# Patient Record
Sex: FEMALE | Race: WHITE | Employment: OTHER | URBAN - NONMETROPOLITAN AREA
[De-identification: names, ages, dates, MRNs, and addresses within clinical notes are randomized per-mention and may not be internally consistent; named-entity substitution may affect disease eponyms.]

---

## 2017-01-17 ENCOUNTER — OFFICE VISIT (OUTPATIENT)
Dept: FAMILY MEDICINE CLINIC | Age: 82
End: 2017-01-17
Payer: MEDICARE

## 2017-01-17 VITALS
TEMPERATURE: 99.2 F | SYSTOLIC BLOOD PRESSURE: 128 MMHG | DIASTOLIC BLOOD PRESSURE: 70 MMHG | RESPIRATION RATE: 20 BRPM | HEIGHT: 68 IN | WEIGHT: 185 LBS | BODY MASS INDEX: 28.04 KG/M2 | HEART RATE: 62 BPM | OXYGEN SATURATION: 96 %

## 2017-01-17 DIAGNOSIS — G89.29 CHRONIC MIDLINE LOW BACK PAIN WITHOUT SCIATICA: ICD-10-CM

## 2017-01-17 DIAGNOSIS — M15.9 PRIMARY OSTEOARTHRITIS INVOLVING MULTIPLE JOINTS: ICD-10-CM

## 2017-01-17 DIAGNOSIS — I10 ESSENTIAL HYPERTENSION: ICD-10-CM

## 2017-01-17 DIAGNOSIS — J30.2 SEASONAL ALLERGIC RHINITIS, UNSPECIFIED ALLERGIC RHINITIS TRIGGER: ICD-10-CM

## 2017-01-17 DIAGNOSIS — M54.50 CHRONIC MIDLINE LOW BACK PAIN WITHOUT SCIATICA: ICD-10-CM

## 2017-01-17 DIAGNOSIS — E03.9 ACQUIRED HYPOTHYROIDISM: ICD-10-CM

## 2017-01-17 DIAGNOSIS — K21.9 GASTROESOPHAGEAL REFLUX DISEASE WITHOUT ESOPHAGITIS: ICD-10-CM

## 2017-01-17 PROCEDURE — 1036F TOBACCO NON-USER: CPT | Performed by: FAMILY MEDICINE

## 2017-01-17 PROCEDURE — G8484 FLU IMMUNIZE NO ADMIN: HCPCS | Performed by: FAMILY MEDICINE

## 2017-01-17 PROCEDURE — G8428 CUR MEDS NOT DOCUMENT: HCPCS | Performed by: FAMILY MEDICINE

## 2017-01-17 PROCEDURE — 4040F PNEUMOC VAC/ADMIN/RCVD: CPT | Performed by: FAMILY MEDICINE

## 2017-01-17 PROCEDURE — 1090F PRES/ABSN URINE INCON ASSESS: CPT | Performed by: FAMILY MEDICINE

## 2017-01-17 PROCEDURE — 96372 THER/PROPH/DIAG INJ SC/IM: CPT | Performed by: FAMILY MEDICINE

## 2017-01-17 PROCEDURE — 99214 OFFICE O/P EST MOD 30 MIN: CPT | Performed by: FAMILY MEDICINE

## 2017-01-17 PROCEDURE — G8399 PT W/DXA RESULTS DOCUMENT: HCPCS | Performed by: FAMILY MEDICINE

## 2017-01-17 PROCEDURE — G8420 CALC BMI NORM PARAMETERS: HCPCS | Performed by: FAMILY MEDICINE

## 2017-01-17 PROCEDURE — 1123F ACP DISCUSS/DSCN MKR DOCD: CPT | Performed by: FAMILY MEDICINE

## 2017-01-17 RX ORDER — TRIAMCINOLONE ACETONIDE 40 MG/ML
80 INJECTION, SUSPENSION INTRA-ARTICULAR; INTRAMUSCULAR ONCE
Status: COMPLETED | OUTPATIENT
Start: 2017-01-17 | End: 2017-01-17

## 2017-01-17 RX ORDER — HYDROCODONE BITARTRATE AND ACETAMINOPHEN 10; 325 MG/1; MG/1
1 TABLET ORAL EVERY 6 HOURS PRN
Qty: 120 TABLET | Refills: 0 | Status: SHIPPED | OUTPATIENT
Start: 2017-01-17 | End: 2017-03-28 | Stop reason: SDUPTHER

## 2017-01-17 RX ADMIN — TRIAMCINOLONE ACETONIDE 80 MG: 40 INJECTION, SUSPENSION INTRA-ARTICULAR; INTRAMUSCULAR at 14:00

## 2017-01-17 ASSESSMENT — ENCOUNTER SYMPTOMS
GASTROINTESTINAL NEGATIVE: 1
BACK PAIN: 1
RESPIRATORY NEGATIVE: 1
EYES NEGATIVE: 1
ALLERGIC/IMMUNOLOGIC NEGATIVE: 1

## 2017-01-24 PROBLEM — K21.9 GASTROESOPHAGEAL REFLUX DISEASE WITHOUT ESOPHAGITIS: Status: ACTIVE | Noted: 2017-01-24

## 2017-03-28 ENCOUNTER — OFFICE VISIT (OUTPATIENT)
Dept: FAMILY MEDICINE CLINIC | Age: 82
End: 2017-03-28
Payer: MEDICARE

## 2017-03-28 VITALS
RESPIRATION RATE: 16 BRPM | DIASTOLIC BLOOD PRESSURE: 64 MMHG | TEMPERATURE: 98.4 F | WEIGHT: 181 LBS | HEART RATE: 70 BPM | OXYGEN SATURATION: 97 % | SYSTOLIC BLOOD PRESSURE: 108 MMHG | HEIGHT: 65 IN | BODY MASS INDEX: 30.16 KG/M2

## 2017-03-28 DIAGNOSIS — M15.9 PRIMARY OSTEOARTHRITIS INVOLVING MULTIPLE JOINTS: ICD-10-CM

## 2017-03-28 DIAGNOSIS — E78.1 HYPERTRIGLYCERIDEMIA: ICD-10-CM

## 2017-03-28 DIAGNOSIS — I10 ESSENTIAL HYPERTENSION: ICD-10-CM

## 2017-03-28 DIAGNOSIS — E03.9 ACQUIRED HYPOTHYROIDISM: ICD-10-CM

## 2017-03-28 DIAGNOSIS — M54.50 CHRONIC MIDLINE LOW BACK PAIN WITHOUT SCIATICA: ICD-10-CM

## 2017-03-28 DIAGNOSIS — K21.9 GASTROESOPHAGEAL REFLUX DISEASE WITHOUT ESOPHAGITIS: ICD-10-CM

## 2017-03-28 DIAGNOSIS — G89.29 CHRONIC MIDLINE LOW BACK PAIN WITHOUT SCIATICA: ICD-10-CM

## 2017-03-28 DIAGNOSIS — J30.2 SEASONAL ALLERGIC RHINITIS, UNSPECIFIED ALLERGIC RHINITIS TRIGGER: ICD-10-CM

## 2017-03-28 DIAGNOSIS — G62.9 NEUROPATHY: ICD-10-CM

## 2017-03-28 PROCEDURE — G8417 CALC BMI ABV UP PARAM F/U: HCPCS | Performed by: FAMILY MEDICINE

## 2017-03-28 PROCEDURE — 99214 OFFICE O/P EST MOD 30 MIN: CPT | Performed by: FAMILY MEDICINE

## 2017-03-28 PROCEDURE — 1090F PRES/ABSN URINE INCON ASSESS: CPT | Performed by: FAMILY MEDICINE

## 2017-03-28 PROCEDURE — G8399 PT W/DXA RESULTS DOCUMENT: HCPCS | Performed by: FAMILY MEDICINE

## 2017-03-28 PROCEDURE — G8484 FLU IMMUNIZE NO ADMIN: HCPCS | Performed by: FAMILY MEDICINE

## 2017-03-28 PROCEDURE — 1036F TOBACCO NON-USER: CPT | Performed by: FAMILY MEDICINE

## 2017-03-28 PROCEDURE — G8427 DOCREV CUR MEDS BY ELIG CLIN: HCPCS | Performed by: FAMILY MEDICINE

## 2017-03-28 PROCEDURE — 4040F PNEUMOC VAC/ADMIN/RCVD: CPT | Performed by: FAMILY MEDICINE

## 2017-03-28 PROCEDURE — 1123F ACP DISCUSS/DSCN MKR DOCD: CPT | Performed by: FAMILY MEDICINE

## 2017-03-28 RX ORDER — PREGABALIN 75 MG/1
75 CAPSULE ORAL 3 TIMES DAILY
Qty: 90 CAPSULE | Refills: 0 | Status: SHIPPED | OUTPATIENT
Start: 2017-03-28 | End: 2017-04-25 | Stop reason: SINTOL

## 2017-03-28 RX ORDER — HYDROCODONE BITARTRATE AND ACETAMINOPHEN 10; 325 MG/1; MG/1
1 TABLET ORAL EVERY 6 HOURS PRN
Qty: 120 TABLET | Refills: 0 | Status: SHIPPED | OUTPATIENT
Start: 2017-03-28 | End: 2017-04-25 | Stop reason: SDUPTHER

## 2017-03-28 ASSESSMENT — ENCOUNTER SYMPTOMS
EYES NEGATIVE: 1
CHEST TIGHTNESS: 0
BACK PAIN: 1
CONSTIPATION: 0
WHEEZING: 0
ALLERGIC/IMMUNOLOGIC NEGATIVE: 1
SHORTNESS OF BREATH: 0
VOMITING: 0
BLOOD IN STOOL: 0
DIARRHEA: 0
NAUSEA: 1

## 2017-04-19 PROBLEM — I25.10 CORONARY ARTERY DISEASE INVOLVING NATIVE CORONARY ARTERY: Status: ACTIVE | Noted: 2017-04-19

## 2017-04-19 PROBLEM — I10 BENIGN HYPERTENSION: Status: ACTIVE | Noted: 2017-04-19

## 2017-04-19 RX ORDER — ESOMEPRAZOLE MAGNESIUM 40 MG/1
40 CAPSULE, DELAYED RELEASE ORAL
COMMUNITY
End: 2020-05-28 | Stop reason: ALTCHOICE

## 2017-04-19 RX ORDER — MELOXICAM 15 MG/1
15 TABLET ORAL DAILY
COMMUNITY
End: 2017-05-16 | Stop reason: ALTCHOICE

## 2017-04-19 RX ORDER — HYDROCODONE BITARTRATE AND ACETAMINOPHEN 10; 325 MG/1; MG/1
1 TABLET ORAL EVERY 6 HOURS PRN
COMMUNITY
End: 2017-11-28 | Stop reason: HOSPADM

## 2017-04-19 RX ORDER — VALSARTAN AND HYDROCHLOROTHIAZIDE 160; 25 MG/1; MG/1
1 TABLET ORAL DAILY
COMMUNITY
End: 2017-11-28 | Stop reason: HOSPADM

## 2017-04-19 RX ORDER — DILTIAZEM HYDROCHLORIDE 90 MG/1
90 CAPSULE, EXTENDED RELEASE ORAL 2 TIMES DAILY
COMMUNITY

## 2017-04-19 RX ORDER — LEVOTHYROXINE SODIUM 0.05 MG/1
50 TABLET ORAL DAILY
COMMUNITY

## 2017-04-19 RX ORDER — SIMVASTATIN 20 MG
20 TABLET ORAL NIGHTLY
COMMUNITY
End: 2020-05-28 | Stop reason: ALTCHOICE

## 2017-04-19 RX ORDER — FUROSEMIDE 20 MG/1
20 TABLET ORAL DAILY
COMMUNITY

## 2017-04-25 ENCOUNTER — OFFICE VISIT (OUTPATIENT)
Dept: FAMILY MEDICINE CLINIC | Age: 82
End: 2017-04-25
Payer: MEDICARE

## 2017-04-25 VITALS
HEART RATE: 69 BPM | HEIGHT: 65 IN | DIASTOLIC BLOOD PRESSURE: 70 MMHG | RESPIRATION RATE: 16 BRPM | WEIGHT: 180 LBS | TEMPERATURE: 98.2 F | OXYGEN SATURATION: 96 % | SYSTOLIC BLOOD PRESSURE: 118 MMHG | BODY MASS INDEX: 29.99 KG/M2

## 2017-04-25 DIAGNOSIS — J30.2 SEASONAL ALLERGIC RHINITIS, UNSPECIFIED ALLERGIC RHINITIS TRIGGER: ICD-10-CM

## 2017-04-25 DIAGNOSIS — G62.9 NEUROPATHY: ICD-10-CM

## 2017-04-25 DIAGNOSIS — M15.9 PRIMARY OSTEOARTHRITIS INVOLVING MULTIPLE JOINTS: ICD-10-CM

## 2017-04-25 DIAGNOSIS — E03.4 HYPOTHYROIDISM DUE TO ACQUIRED ATROPHY OF THYROID: ICD-10-CM

## 2017-04-25 DIAGNOSIS — I10 ESSENTIAL HYPERTENSION: ICD-10-CM

## 2017-04-25 DIAGNOSIS — N39.0 URINARY TRACT INFECTION WITHOUT HEMATURIA, SITE UNSPECIFIED: ICD-10-CM

## 2017-04-25 DIAGNOSIS — M54.50 CHRONIC MIDLINE LOW BACK PAIN WITHOUT SCIATICA: ICD-10-CM

## 2017-04-25 DIAGNOSIS — K21.9 GASTROESOPHAGEAL REFLUX DISEASE WITHOUT ESOPHAGITIS: ICD-10-CM

## 2017-04-25 DIAGNOSIS — E78.1 HYPERTRIGLYCERIDEMIA: ICD-10-CM

## 2017-04-25 DIAGNOSIS — I34.1 MVP (MITRAL VALVE PROLAPSE): ICD-10-CM

## 2017-04-25 DIAGNOSIS — G89.29 CHRONIC MIDLINE LOW BACK PAIN WITHOUT SCIATICA: ICD-10-CM

## 2017-04-25 LAB
BILIRUBIN URINE: ABNORMAL
BLOOD, URINE: ABNORMAL
CLARITY: ABNORMAL
COLOR: ABNORMAL
GLUCOSE URINE: 250 MG/DL
KETONES, URINE: 15 MG/DL
LEUKOCYTE ESTERASE, URINE: ABNORMAL
NITRITE, URINE: POSITIVE
PH UA: 5
PROTEIN UA: 100 MG/DL
SPECIFIC GRAVITY UA: <=1.005
URINE TYPE: ABNORMAL
UROBILINOGEN, URINE: >=8 E.U./DL

## 2017-04-25 PROCEDURE — G8420 CALC BMI NORM PARAMETERS: HCPCS | Performed by: FAMILY MEDICINE

## 2017-04-25 PROCEDURE — 96372 THER/PROPH/DIAG INJ SC/IM: CPT | Performed by: FAMILY MEDICINE

## 2017-04-25 PROCEDURE — 4040F PNEUMOC VAC/ADMIN/RCVD: CPT | Performed by: FAMILY MEDICINE

## 2017-04-25 PROCEDURE — 1090F PRES/ABSN URINE INCON ASSESS: CPT | Performed by: FAMILY MEDICINE

## 2017-04-25 PROCEDURE — G8399 PT W/DXA RESULTS DOCUMENT: HCPCS | Performed by: FAMILY MEDICINE

## 2017-04-25 PROCEDURE — G8427 DOCREV CUR MEDS BY ELIG CLIN: HCPCS | Performed by: FAMILY MEDICINE

## 2017-04-25 PROCEDURE — 1123F ACP DISCUSS/DSCN MKR DOCD: CPT | Performed by: FAMILY MEDICINE

## 2017-04-25 PROCEDURE — 99214 OFFICE O/P EST MOD 30 MIN: CPT | Performed by: FAMILY MEDICINE

## 2017-04-25 PROCEDURE — 1036F TOBACCO NON-USER: CPT | Performed by: FAMILY MEDICINE

## 2017-04-25 RX ORDER — CIPROFLOXACIN 500 MG/1
500 TABLET, FILM COATED ORAL 2 TIMES DAILY
Qty: 20 TABLET | Refills: 0 | Status: SHIPPED | OUTPATIENT
Start: 2017-04-25 | End: 2017-05-05

## 2017-04-25 RX ORDER — TRIAMCINOLONE ACETONIDE 40 MG/ML
80 INJECTION, SUSPENSION INTRA-ARTICULAR; INTRAMUSCULAR ONCE
Status: COMPLETED | OUTPATIENT
Start: 2017-04-25 | End: 2017-04-25

## 2017-04-25 RX ORDER — CEFTRIAXONE 500 MG/1
1000 INJECTION, POWDER, FOR SOLUTION INTRAMUSCULAR; INTRAVENOUS ONCE
Status: COMPLETED | OUTPATIENT
Start: 2017-04-25 | End: 2017-04-25

## 2017-04-25 RX ORDER — HYDROCODONE BITARTRATE AND ACETAMINOPHEN 10; 325 MG/1; MG/1
1 TABLET ORAL EVERY 6 HOURS PRN
Qty: 120 TABLET | Refills: 0 | Status: SHIPPED | OUTPATIENT
Start: 2017-04-25 | End: 2017-06-17 | Stop reason: SDUPTHER

## 2017-04-25 RX ORDER — PHENAZOPYRIDINE HYDROCHLORIDE 200 MG/1
200 TABLET, FILM COATED ORAL 3 TIMES DAILY PRN
Qty: 15 TABLET | Refills: 1 | Status: SHIPPED | OUTPATIENT
Start: 2017-04-25 | End: 2017-04-28

## 2017-04-25 RX ADMIN — TRIAMCINOLONE ACETONIDE 80 MG: 40 INJECTION, SUSPENSION INTRA-ARTICULAR; INTRAMUSCULAR at 13:43

## 2017-04-25 RX ADMIN — CEFTRIAXONE 1000 MG: 500 INJECTION, POWDER, FOR SOLUTION INTRAMUSCULAR; INTRAVENOUS at 13:42

## 2017-04-25 ASSESSMENT — ENCOUNTER SYMPTOMS
EYES NEGATIVE: 1
CONSTIPATION: 0
VOMITING: 0
NAUSEA: 0
BLOOD IN STOOL: 0
DIARRHEA: 0
SHORTNESS OF BREATH: 0
WHEEZING: 0
CHEST TIGHTNESS: 0
BACK PAIN: 1
ALLERGIC/IMMUNOLOGIC NEGATIVE: 1

## 2017-04-27 LAB
ORGANISM: ABNORMAL
URINE CULTURE, ROUTINE: ABNORMAL
URINE CULTURE, ROUTINE: ABNORMAL

## 2017-05-16 ENCOUNTER — OFFICE VISIT (OUTPATIENT)
Dept: CARDIOLOGY | Facility: CLINIC | Age: 82
End: 2017-05-16

## 2017-05-16 VITALS
HEART RATE: 63 BPM | DIASTOLIC BLOOD PRESSURE: 64 MMHG | WEIGHT: 182 LBS | SYSTOLIC BLOOD PRESSURE: 118 MMHG | HEIGHT: 65 IN | BODY MASS INDEX: 30.32 KG/M2

## 2017-05-16 DIAGNOSIS — E78.5 HYPERLIPIDEMIA, UNSPECIFIED HYPERLIPIDEMIA TYPE: ICD-10-CM

## 2017-05-16 DIAGNOSIS — I25.10 CORONARY ARTERY DISEASE INVOLVING NATIVE CORONARY ARTERY OF NATIVE HEART WITHOUT ANGINA PECTORIS: Primary | ICD-10-CM

## 2017-05-16 DIAGNOSIS — I10 BENIGN HYPERTENSION: ICD-10-CM

## 2017-05-16 DIAGNOSIS — I50.32 CHRONIC DIASTOLIC (CONGESTIVE) HEART FAILURE (HCC): ICD-10-CM

## 2017-05-16 PROCEDURE — 99213 OFFICE O/P EST LOW 20 MIN: CPT | Performed by: INTERNAL MEDICINE

## 2017-05-16 PROCEDURE — 93000 ELECTROCARDIOGRAM COMPLETE: CPT | Performed by: INTERNAL MEDICINE

## 2017-05-16 RX ORDER — CETIRIZINE HYDROCHLORIDE 5 MG/1
5 TABLET ORAL DAILY
COMMUNITY

## 2017-05-16 RX ORDER — CHLORAL HYDRATE 500 MG
2000 CAPSULE ORAL
COMMUNITY

## 2017-05-22 DIAGNOSIS — I10 ESSENTIAL HYPERTENSION: ICD-10-CM

## 2017-05-22 RX ORDER — DILTIAZEM HCL 90 MG
TABLET ORAL
Qty: 180 TABLET | Refills: 3 | Status: SHIPPED | OUTPATIENT
Start: 2017-05-22 | End: 2018-06-16 | Stop reason: SDUPTHER

## 2017-05-23 RX ORDER — CIPROFLOXACIN 500 MG/1
500 TABLET, FILM COATED ORAL 2 TIMES DAILY
Qty: 20 TABLET | Refills: 0 | Status: SHIPPED | OUTPATIENT
Start: 2017-05-23 | End: 2017-06-02

## 2017-05-23 RX ORDER — BENZONATATE 200 MG/1
200 CAPSULE ORAL 3 TIMES DAILY PRN
Qty: 40 CAPSULE | Refills: 0 | Status: SHIPPED | OUTPATIENT
Start: 2017-05-23 | End: 2017-06-17 | Stop reason: ALTCHOICE

## 2017-06-17 ENCOUNTER — OFFICE VISIT (OUTPATIENT)
Dept: FAMILY MEDICINE CLINIC | Age: 82
End: 2017-06-17
Payer: MEDICARE

## 2017-06-17 VITALS
RESPIRATION RATE: 16 BRPM | OXYGEN SATURATION: 97 % | WEIGHT: 183 LBS | SYSTOLIC BLOOD PRESSURE: 116 MMHG | HEIGHT: 65 IN | BODY MASS INDEX: 30.49 KG/M2 | HEART RATE: 87 BPM | TEMPERATURE: 98.3 F | DIASTOLIC BLOOD PRESSURE: 68 MMHG

## 2017-06-17 DIAGNOSIS — M15.9 PRIMARY OSTEOARTHRITIS INVOLVING MULTIPLE JOINTS: ICD-10-CM

## 2017-06-17 DIAGNOSIS — G89.29 CHRONIC MIDLINE LOW BACK PAIN WITHOUT SCIATICA: ICD-10-CM

## 2017-06-17 DIAGNOSIS — K21.9 GASTROESOPHAGEAL REFLUX DISEASE WITHOUT ESOPHAGITIS: ICD-10-CM

## 2017-06-17 DIAGNOSIS — E03.4 HYPOTHYROIDISM DUE TO ACQUIRED ATROPHY OF THYROID: ICD-10-CM

## 2017-06-17 DIAGNOSIS — I10 ESSENTIAL HYPERTENSION: ICD-10-CM

## 2017-06-17 DIAGNOSIS — E78.1 HYPERTRIGLYCERIDEMIA: ICD-10-CM

## 2017-06-17 DIAGNOSIS — M54.50 CHRONIC MIDLINE LOW BACK PAIN WITHOUT SCIATICA: ICD-10-CM

## 2017-06-17 PROCEDURE — G8399 PT W/DXA RESULTS DOCUMENT: HCPCS | Performed by: FAMILY MEDICINE

## 2017-06-17 PROCEDURE — 4040F PNEUMOC VAC/ADMIN/RCVD: CPT | Performed by: FAMILY MEDICINE

## 2017-06-17 PROCEDURE — 1090F PRES/ABSN URINE INCON ASSESS: CPT | Performed by: FAMILY MEDICINE

## 2017-06-17 PROCEDURE — 1036F TOBACCO NON-USER: CPT | Performed by: FAMILY MEDICINE

## 2017-06-17 PROCEDURE — 1123F ACP DISCUSS/DSCN MKR DOCD: CPT | Performed by: FAMILY MEDICINE

## 2017-06-17 PROCEDURE — 99214 OFFICE O/P EST MOD 30 MIN: CPT | Performed by: FAMILY MEDICINE

## 2017-06-17 PROCEDURE — G8427 DOCREV CUR MEDS BY ELIG CLIN: HCPCS | Performed by: FAMILY MEDICINE

## 2017-06-17 PROCEDURE — G8417 CALC BMI ABV UP PARAM F/U: HCPCS | Performed by: FAMILY MEDICINE

## 2017-06-17 RX ORDER — HYDROCODONE BITARTRATE AND ACETAMINOPHEN 10; 325 MG/1; MG/1
1 TABLET ORAL EVERY 6 HOURS PRN
Qty: 120 TABLET | Refills: 0 | Status: SHIPPED | OUTPATIENT
Start: 2017-06-17 | End: 2017-08-07 | Stop reason: SDUPTHER

## 2017-06-17 ASSESSMENT — ENCOUNTER SYMPTOMS
BACK PAIN: 1
WHEEZING: 0
ALLERGIC/IMMUNOLOGIC NEGATIVE: 1
BLOOD IN STOOL: 0
SHORTNESS OF BREATH: 0
VOMITING: 0
CONSTIPATION: 0
CHEST TIGHTNESS: 0
EYES NEGATIVE: 1
NAUSEA: 0
DIARRHEA: 0

## 2017-08-07 ENCOUNTER — OFFICE VISIT (OUTPATIENT)
Dept: FAMILY MEDICINE CLINIC | Age: 82
End: 2017-08-07
Payer: MEDICARE

## 2017-08-07 VITALS
OXYGEN SATURATION: 97 % | DIASTOLIC BLOOD PRESSURE: 64 MMHG | HEIGHT: 64 IN | HEART RATE: 68 BPM | TEMPERATURE: 98.7 F | BODY MASS INDEX: 32.1 KG/M2 | WEIGHT: 188 LBS | SYSTOLIC BLOOD PRESSURE: 130 MMHG | RESPIRATION RATE: 16 BRPM

## 2017-08-07 DIAGNOSIS — K21.9 GASTROESOPHAGEAL REFLUX DISEASE WITHOUT ESOPHAGITIS: ICD-10-CM

## 2017-08-07 DIAGNOSIS — M15.9 PRIMARY OSTEOARTHRITIS INVOLVING MULTIPLE JOINTS: ICD-10-CM

## 2017-08-07 DIAGNOSIS — J30.2 SEASONAL ALLERGIC RHINITIS, UNSPECIFIED ALLERGIC RHINITIS TRIGGER: ICD-10-CM

## 2017-08-07 DIAGNOSIS — I10 ESSENTIAL HYPERTENSION: ICD-10-CM

## 2017-08-07 DIAGNOSIS — G89.29 CHRONIC MIDLINE LOW BACK PAIN WITHOUT SCIATICA: ICD-10-CM

## 2017-08-07 DIAGNOSIS — M54.50 CHRONIC MIDLINE LOW BACK PAIN WITHOUT SCIATICA: ICD-10-CM

## 2017-08-07 DIAGNOSIS — E78.1 HYPERTRIGLYCERIDEMIA: ICD-10-CM

## 2017-08-07 DIAGNOSIS — E03.4 HYPOTHYROIDISM DUE TO ACQUIRED ATROPHY OF THYROID: ICD-10-CM

## 2017-08-07 DIAGNOSIS — R73.9 HYPERGLYCEMIA: ICD-10-CM

## 2017-08-07 LAB — GLUCOSE BLD-MCNC: 98 MG/DL

## 2017-08-07 PROCEDURE — G8428 CUR MEDS NOT DOCUMENT: HCPCS | Performed by: FAMILY MEDICINE

## 2017-08-07 PROCEDURE — G8417 CALC BMI ABV UP PARAM F/U: HCPCS | Performed by: FAMILY MEDICINE

## 2017-08-07 PROCEDURE — 96372 THER/PROPH/DIAG INJ SC/IM: CPT | Performed by: FAMILY MEDICINE

## 2017-08-07 PROCEDURE — 82962 GLUCOSE BLOOD TEST: CPT | Performed by: FAMILY MEDICINE

## 2017-08-07 PROCEDURE — 4040F PNEUMOC VAC/ADMIN/RCVD: CPT | Performed by: FAMILY MEDICINE

## 2017-08-07 PROCEDURE — 1036F TOBACCO NON-USER: CPT | Performed by: FAMILY MEDICINE

## 2017-08-07 PROCEDURE — 1090F PRES/ABSN URINE INCON ASSESS: CPT | Performed by: FAMILY MEDICINE

## 2017-08-07 PROCEDURE — 99214 OFFICE O/P EST MOD 30 MIN: CPT | Performed by: FAMILY MEDICINE

## 2017-08-07 PROCEDURE — G8399 PT W/DXA RESULTS DOCUMENT: HCPCS | Performed by: FAMILY MEDICINE

## 2017-08-07 PROCEDURE — 1123F ACP DISCUSS/DSCN MKR DOCD: CPT | Performed by: FAMILY MEDICINE

## 2017-08-07 RX ORDER — HYDROCODONE BITARTRATE AND ACETAMINOPHEN 10; 325 MG/1; MG/1
1 TABLET ORAL EVERY 6 HOURS PRN
Qty: 120 TABLET | Refills: 0 | Status: SHIPPED | OUTPATIENT
Start: 2017-08-07 | End: 2017-09-18 | Stop reason: SDUPTHER

## 2017-08-07 RX ORDER — TRIAMCINOLONE ACETONIDE 40 MG/ML
80 INJECTION, SUSPENSION INTRA-ARTICULAR; INTRAMUSCULAR ONCE
Status: COMPLETED | OUTPATIENT
Start: 2017-08-07 | End: 2017-08-07

## 2017-08-07 RX ADMIN — TRIAMCINOLONE ACETONIDE 80 MG: 40 INJECTION, SUSPENSION INTRA-ARTICULAR; INTRAMUSCULAR at 14:46

## 2017-08-07 ASSESSMENT — ENCOUNTER SYMPTOMS
ALLERGIC/IMMUNOLOGIC NEGATIVE: 1
EYES NEGATIVE: 1
VOMITING: 0
BLOOD IN STOOL: 0
CHEST TIGHTNESS: 0
CONSTIPATION: 0
NAUSEA: 0
BACK PAIN: 1
WHEEZING: 0
DIARRHEA: 0
SHORTNESS OF BREATH: 0

## 2017-09-18 ENCOUNTER — OFFICE VISIT (OUTPATIENT)
Dept: FAMILY MEDICINE CLINIC | Age: 82
End: 2017-09-18
Payer: MEDICARE

## 2017-09-18 VITALS
RESPIRATION RATE: 16 BRPM | DIASTOLIC BLOOD PRESSURE: 64 MMHG | SYSTOLIC BLOOD PRESSURE: 118 MMHG | HEART RATE: 82 BPM | TEMPERATURE: 97.7 F | OXYGEN SATURATION: 97 % | HEIGHT: 64 IN | WEIGHT: 185 LBS | BODY MASS INDEX: 31.58 KG/M2

## 2017-09-18 DIAGNOSIS — E03.9 HYPOTHYROIDISM, UNSPECIFIED TYPE: ICD-10-CM

## 2017-09-18 DIAGNOSIS — G89.29 CHRONIC MIDLINE LOW BACK PAIN WITHOUT SCIATICA: ICD-10-CM

## 2017-09-18 DIAGNOSIS — M25.562 CHRONIC PAIN OF BOTH KNEES: ICD-10-CM

## 2017-09-18 DIAGNOSIS — E55.9 VITAMIN D DEFICIENCY: ICD-10-CM

## 2017-09-18 DIAGNOSIS — R53.1 WEAKNESS: ICD-10-CM

## 2017-09-18 DIAGNOSIS — G89.29 CHRONIC PAIN OF BOTH KNEES: ICD-10-CM

## 2017-09-18 DIAGNOSIS — R73.9 HYPERGLYCEMIA: ICD-10-CM

## 2017-09-18 DIAGNOSIS — I10 ESSENTIAL HYPERTENSION: ICD-10-CM

## 2017-09-18 DIAGNOSIS — R53.82 CHRONIC FATIGUE: ICD-10-CM

## 2017-09-18 DIAGNOSIS — M25.561 CHRONIC PAIN OF BOTH KNEES: ICD-10-CM

## 2017-09-18 DIAGNOSIS — G47.33 OSA (OBSTRUCTIVE SLEEP APNEA): ICD-10-CM

## 2017-09-18 DIAGNOSIS — M15.9 PRIMARY OSTEOARTHRITIS INVOLVING MULTIPLE JOINTS: ICD-10-CM

## 2017-09-18 DIAGNOSIS — R23.3 EASY BRUISING: ICD-10-CM

## 2017-09-18 DIAGNOSIS — E78.1 HYPERTRIGLYCERIDEMIA: ICD-10-CM

## 2017-09-18 DIAGNOSIS — M54.50 CHRONIC MIDLINE LOW BACK PAIN WITHOUT SCIATICA: ICD-10-CM

## 2017-09-18 PROCEDURE — G8399 PT W/DXA RESULTS DOCUMENT: HCPCS | Performed by: FAMILY MEDICINE

## 2017-09-18 PROCEDURE — G8427 DOCREV CUR MEDS BY ELIG CLIN: HCPCS | Performed by: FAMILY MEDICINE

## 2017-09-18 PROCEDURE — G8417 CALC BMI ABV UP PARAM F/U: HCPCS | Performed by: FAMILY MEDICINE

## 2017-09-18 PROCEDURE — 1123F ACP DISCUSS/DSCN MKR DOCD: CPT | Performed by: FAMILY MEDICINE

## 2017-09-18 PROCEDURE — 1090F PRES/ABSN URINE INCON ASSESS: CPT | Performed by: FAMILY MEDICINE

## 2017-09-18 PROCEDURE — 1036F TOBACCO NON-USER: CPT | Performed by: FAMILY MEDICINE

## 2017-09-18 PROCEDURE — 4040F PNEUMOC VAC/ADMIN/RCVD: CPT | Performed by: FAMILY MEDICINE

## 2017-09-18 PROCEDURE — 99214 OFFICE O/P EST MOD 30 MIN: CPT | Performed by: FAMILY MEDICINE

## 2017-09-18 RX ORDER — HYDROCODONE BITARTRATE AND ACETAMINOPHEN 10; 325 MG/1; MG/1
1 TABLET ORAL EVERY 6 HOURS PRN
Qty: 120 TABLET | Refills: 0 | Status: SHIPPED | OUTPATIENT
Start: 2017-09-18 | End: 2017-10-25 | Stop reason: SDUPTHER

## 2017-09-18 ASSESSMENT — ENCOUNTER SYMPTOMS
CHEST TIGHTNESS: 0
BACK PAIN: 1
BLOOD IN STOOL: 0
ALLERGIC/IMMUNOLOGIC NEGATIVE: 1
CONSTIPATION: 0
EYES NEGATIVE: 1
DIARRHEA: 0
SHORTNESS OF BREATH: 0
NAUSEA: 0
VOMITING: 0
WHEEZING: 0

## 2017-09-20 DIAGNOSIS — I10 ESSENTIAL HYPERTENSION: ICD-10-CM

## 2017-09-20 DIAGNOSIS — R53.82 CHRONIC FATIGUE: ICD-10-CM

## 2017-09-20 DIAGNOSIS — E55.9 VITAMIN D DEFICIENCY: ICD-10-CM

## 2017-09-20 DIAGNOSIS — E03.9 HYPOTHYROIDISM, UNSPECIFIED TYPE: ICD-10-CM

## 2017-09-20 DIAGNOSIS — R23.3 EASY BRUISING: ICD-10-CM

## 2017-09-20 DIAGNOSIS — M54.50 CHRONIC MIDLINE LOW BACK PAIN WITHOUT SCIATICA: ICD-10-CM

## 2017-09-20 DIAGNOSIS — M15.9 PRIMARY OSTEOARTHRITIS INVOLVING MULTIPLE JOINTS: ICD-10-CM

## 2017-09-20 DIAGNOSIS — E78.1 HYPERTRIGLYCERIDEMIA: ICD-10-CM

## 2017-09-20 DIAGNOSIS — M25.561 ACUTE PAIN OF BOTH KNEES: ICD-10-CM

## 2017-09-20 DIAGNOSIS — R73.9 HYPERGLYCEMIA: ICD-10-CM

## 2017-09-20 DIAGNOSIS — R53.1 WEAKNESS: ICD-10-CM

## 2017-09-20 DIAGNOSIS — G89.29 CHRONIC MIDLINE LOW BACK PAIN WITHOUT SCIATICA: ICD-10-CM

## 2017-09-20 DIAGNOSIS — M25.562 ACUTE PAIN OF BOTH KNEES: ICD-10-CM

## 2017-09-20 LAB
ALBUMIN SERPL-MCNC: 3.4 G/DL (ref 3.5–5.2)
ALP BLD-CCNC: 65 U/L (ref 35–104)
ALT SERPL-CCNC: 18 U/L (ref 5–33)
ANION GAP SERPL CALCULATED.3IONS-SCNC: 15 MMOL/L (ref 7–19)
AST SERPL-CCNC: 17 U/L (ref 5–32)
BILIRUB SERPL-MCNC: 0.3 MG/DL (ref 0.2–1.2)
BILIRUBIN DIRECT: 0.1 MG/DL (ref 0–0.3)
BILIRUBIN URINE: NEGATIVE
BILIRUBIN, INDIRECT: 0.2 MG/DL (ref 0.1–1)
BLOOD, URINE: NEGATIVE
BUN BLDV-MCNC: 21 MG/DL (ref 8–23)
CALCIUM SERPL-MCNC: 9.5 MG/DL (ref 8.8–10.2)
CHLORIDE BLD-SCNC: 93 MMOL/L (ref 98–111)
CLARITY: CLEAR
CO2: 25 MMOL/L (ref 22–29)
COLOR: ABNORMAL
CREAT SERPL-MCNC: 0.8 MG/DL (ref 0.5–0.9)
GFR NON-AFRICAN AMERICAN: >60
GLUCOSE BLD-MCNC: 117 MG/DL (ref 74–109)
GLUCOSE URINE: NEGATIVE MG/DL
HCT VFR BLD CALC: 34.5 % (ref 37–47)
HEMOGLOBIN: 11.6 G/DL (ref 12–16)
KETONES, URINE: NEGATIVE MG/DL
LEUKOCYTE ESTERASE, URINE: ABNORMAL
MCH RBC QN AUTO: 31.5 PG (ref 27–31)
MCHC RBC AUTO-ENTMCNC: 33.6 G/DL (ref 33–37)
MCV RBC AUTO: 93.8 FL (ref 81–99)
NITRITE, URINE: NEGATIVE
PDW BLD-RTO: 12.5 % (ref 11.5–14.5)
PH UA: 7.5
PLATELET # BLD: 293 K/UL (ref 130–400)
PMV BLD AUTO: 9.3 FL (ref 9.4–12.3)
POTASSIUM SERPL-SCNC: 4 MMOL/L (ref 3.5–5)
PROTEIN UA: NEGATIVE MG/DL
RBC # BLD: 3.68 M/UL (ref 4.2–5.4)
SODIUM BLD-SCNC: 133 MMOL/L (ref 136–145)
SPECIFIC GRAVITY UA: 1.02
T4 FREE: 1.7 NG/DL (ref 0.9–1.7)
TOTAL PROTEIN: 6.8 G/DL (ref 6.6–8.7)
TSH SERPL DL<=0.05 MIU/L-ACNC: 0.42 UIU/ML (ref 0.27–4.2)
UROBILINOGEN, URINE: 1 E.U./DL
VITAMIN B-12: 669 PG/ML (ref 211–946)
VITAMIN D 25-HYDROXY: 41.6 NG/ML
WBC # BLD: 6.6 K/UL (ref 4.8–10.8)

## 2017-09-21 ENCOUNTER — TELEPHONE (OUTPATIENT)
Dept: FAMILY MEDICINE CLINIC | Age: 82
End: 2017-09-21

## 2017-09-23 LAB — URINE CULTURE, ROUTINE: NORMAL

## 2017-09-25 LAB
CHOLESTEROL, TOTAL: 148 MG/DL
EER LIPOPROFILE BY NMR: ABNORMAL
HDL PARTICLE NO, NMR: >41 UMOL/L
HDL SIZE: 8.9 NM
HDLC SERPL-MCNC: 70 MG/DL (ref 40–59)
LARGE HDL PARTICLE, NMR: 5.7 UMOL/L
LARGE VLDL PARTICLE, NMR: <1.5 NMOL/L
LDL CHOLESTEROL: 51 MG/DL
LDL PARTICLE NUMBER, NMR: 744 NMOL/L
LDL PARTICLE SIZE: 20.9 NM
SMALL LDL PARTICLE, NMR: 392 NMOL/L
TRIGL SERPL-MCNC: 134 MG/DL (ref 30–149)
VLDL SIZE: 47.8 NM

## 2017-09-29 ENCOUNTER — TELEPHONE (OUTPATIENT)
Dept: FAMILY MEDICINE CLINIC | Age: 82
End: 2017-09-29

## 2017-10-06 ENCOUNTER — TELEPHONE (OUTPATIENT)
Dept: FAMILY MEDICINE CLINIC | Age: 82
End: 2017-10-06

## 2017-10-09 RX ORDER — FUROSEMIDE 20 MG/1
TABLET ORAL
Qty: 60 TABLET | Refills: 3 | Status: SHIPPED | OUTPATIENT
Start: 2017-10-09 | End: 2017-10-25 | Stop reason: SDUPTHER

## 2017-10-25 ENCOUNTER — OFFICE VISIT (OUTPATIENT)
Dept: FAMILY MEDICINE CLINIC | Age: 82
End: 2017-10-25
Payer: MEDICARE

## 2017-10-25 VITALS
OXYGEN SATURATION: 95 % | DIASTOLIC BLOOD PRESSURE: 60 MMHG | HEIGHT: 66 IN | TEMPERATURE: 98.6 F | RESPIRATION RATE: 16 BRPM | HEART RATE: 75 BPM | SYSTOLIC BLOOD PRESSURE: 118 MMHG

## 2017-10-25 DIAGNOSIS — G89.29 CHRONIC MIDLINE LOW BACK PAIN WITHOUT SCIATICA: ICD-10-CM

## 2017-10-25 DIAGNOSIS — E03.9 ACQUIRED HYPOTHYROIDISM: ICD-10-CM

## 2017-10-25 DIAGNOSIS — Z23 NEED FOR INFLUENZA VACCINATION: ICD-10-CM

## 2017-10-25 DIAGNOSIS — M15.9 PRIMARY OSTEOARTHRITIS INVOLVING MULTIPLE JOINTS: ICD-10-CM

## 2017-10-25 DIAGNOSIS — E78.5 HYPERLIPIDEMIA, UNSPECIFIED HYPERLIPIDEMIA TYPE: ICD-10-CM

## 2017-10-25 DIAGNOSIS — J30.2 CHRONIC SEASONAL ALLERGIC RHINITIS, UNSPECIFIED TRIGGER: ICD-10-CM

## 2017-10-25 DIAGNOSIS — R60.0 LOCALIZED EDEMA: Primary | ICD-10-CM

## 2017-10-25 DIAGNOSIS — L65.9 ALOPECIA: ICD-10-CM

## 2017-10-25 DIAGNOSIS — E55.9 VITAMIN D DEFICIENCY: ICD-10-CM

## 2017-10-25 DIAGNOSIS — E78.1 HYPERTRIGLYCERIDEMIA: ICD-10-CM

## 2017-10-25 DIAGNOSIS — M54.50 CHRONIC MIDLINE LOW BACK PAIN WITHOUT SCIATICA: ICD-10-CM

## 2017-10-25 DIAGNOSIS — R23.3 EASY BRUISING: ICD-10-CM

## 2017-10-25 DIAGNOSIS — I10 ESSENTIAL HYPERTENSION: ICD-10-CM

## 2017-10-25 PROCEDURE — 1036F TOBACCO NON-USER: CPT | Performed by: FAMILY MEDICINE

## 2017-10-25 PROCEDURE — 1123F ACP DISCUSS/DSCN MKR DOCD: CPT | Performed by: FAMILY MEDICINE

## 2017-10-25 PROCEDURE — 1090F PRES/ABSN URINE INCON ASSESS: CPT | Performed by: FAMILY MEDICINE

## 2017-10-25 PROCEDURE — 99214 OFFICE O/P EST MOD 30 MIN: CPT | Performed by: FAMILY MEDICINE

## 2017-10-25 PROCEDURE — G8399 PT W/DXA RESULTS DOCUMENT: HCPCS | Performed by: FAMILY MEDICINE

## 2017-10-25 PROCEDURE — 4040F PNEUMOC VAC/ADMIN/RCVD: CPT | Performed by: FAMILY MEDICINE

## 2017-10-25 PROCEDURE — G8427 DOCREV CUR MEDS BY ELIG CLIN: HCPCS | Performed by: FAMILY MEDICINE

## 2017-10-25 PROCEDURE — G8417 CALC BMI ABV UP PARAM F/U: HCPCS | Performed by: FAMILY MEDICINE

## 2017-10-25 PROCEDURE — G8484 FLU IMMUNIZE NO ADMIN: HCPCS | Performed by: FAMILY MEDICINE

## 2017-10-25 PROCEDURE — 90662 IIV NO PRSV INCREASED AG IM: CPT | Performed by: FAMILY MEDICINE

## 2017-10-25 PROCEDURE — G0008 ADMIN INFLUENZA VIRUS VAC: HCPCS | Performed by: FAMILY MEDICINE

## 2017-10-25 RX ORDER — FUROSEMIDE 40 MG/1
TABLET ORAL
Qty: 90 TABLET | Refills: 3 | Status: SHIPPED | OUTPATIENT
Start: 2017-10-25 | End: 2018-11-19 | Stop reason: SDUPTHER

## 2017-10-25 RX ORDER — HYDROCODONE BITARTRATE AND ACETAMINOPHEN 10; 325 MG/1; MG/1
1 TABLET ORAL EVERY 6 HOURS PRN
Qty: 120 TABLET | Refills: 0 | Status: SHIPPED | OUTPATIENT
Start: 2017-10-25 | End: 2018-01-04 | Stop reason: SDUPTHER

## 2017-10-25 RX ORDER — FUROSEMIDE 20 MG/1
TABLET ORAL
Qty: 90 TABLET | Refills: 3 | Status: SHIPPED | OUTPATIENT
Start: 2017-10-25 | End: 2017-10-25 | Stop reason: SDUPTHER

## 2017-10-25 ASSESSMENT — ENCOUNTER SYMPTOMS
CHEST TIGHTNESS: 0
NAUSEA: 0
BLOOD IN STOOL: 0
WHEEZING: 0
CONSTIPATION: 0
EYES NEGATIVE: 1
SHORTNESS OF BREATH: 0
BACK PAIN: 1
VOMITING: 0
ALLERGIC/IMMUNOLOGIC NEGATIVE: 1
DIARRHEA: 0
COUGH: 0

## 2017-10-30 DIAGNOSIS — E03.9 HYPOTHYROIDISM: ICD-10-CM

## 2017-10-30 RX ORDER — LEVOTHYROXINE SODIUM 0.05 MG/1
TABLET ORAL
Qty: 90 TABLET | Refills: 3 | Status: SHIPPED | OUTPATIENT
Start: 2017-10-30 | End: 2018-11-29 | Stop reason: SDUPTHER

## 2017-11-13 DIAGNOSIS — M54.50 CHRONIC MIDLINE LOW BACK PAIN WITHOUT SCIATICA: ICD-10-CM

## 2017-11-13 DIAGNOSIS — I10 ESSENTIAL HYPERTENSION: ICD-10-CM

## 2017-11-13 DIAGNOSIS — G89.29 CHRONIC MIDLINE LOW BACK PAIN WITHOUT SCIATICA: ICD-10-CM

## 2017-11-14 RX ORDER — VALSARTAN AND HYDROCHLOROTHIAZIDE 160; 25 MG/1; MG/1
TABLET ORAL
Qty: 90 TABLET | Refills: 3 | Status: SHIPPED | OUTPATIENT
Start: 2017-11-14 | End: 2018-12-10 | Stop reason: SDUPTHER

## 2017-11-14 RX ORDER — SIMVASTATIN 20 MG
TABLET ORAL
Qty: 90 TABLET | Refills: 3 | Status: SHIPPED | OUTPATIENT
Start: 2017-11-14 | End: 2018-12-10 | Stop reason: SDUPTHER

## 2017-11-22 ENCOUNTER — APPOINTMENT (OUTPATIENT)
Dept: CT IMAGING | Facility: HOSPITAL | Age: 82
End: 2017-11-22

## 2017-11-22 ENCOUNTER — APPOINTMENT (OUTPATIENT)
Dept: GENERAL RADIOLOGY | Facility: HOSPITAL | Age: 82
End: 2017-11-22

## 2017-11-22 ENCOUNTER — HOSPITAL ENCOUNTER (INPATIENT)
Facility: HOSPITAL | Age: 82
LOS: 4 days | Discharge: NURSING, REHAB OR LONG-TERM CARE | End: 2017-11-28
Attending: EMERGENCY MEDICINE | Admitting: INTERNAL MEDICINE

## 2017-11-22 DIAGNOSIS — Z74.09 IMPAIRED MOBILITY AND ADLS: ICD-10-CM

## 2017-11-22 DIAGNOSIS — Z74.09 DECREASED FUNCTIONAL MOBILITY AND ENDURANCE: ICD-10-CM

## 2017-11-22 DIAGNOSIS — Z78.9 IMPAIRED MOBILITY AND ADLS: ICD-10-CM

## 2017-11-22 DIAGNOSIS — S32.592A CLOSED FRACTURE OF MULTIPLE RAMI OF LEFT PUBIS, INITIAL ENCOUNTER (HCC): ICD-10-CM

## 2017-11-22 DIAGNOSIS — R79.89 LOW TSH LEVEL: ICD-10-CM
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
## 2017-11-22 LAB
ANION GAP SERPL CALCULATED.3IONS-SCNC: 8 MMOL/L (ref 4–13)
BASOPHILS # BLD AUTO: 0.01 10*3/MM3 (ref 0–0.2)
BASOPHILS NFR BLD AUTO: 0.1 % (ref 0–2)
BUN BLD-MCNC: 33 MG/DL (ref 5–21)
BUN/CREAT SERPL: 34.7 (ref 7–25)
CALCIUM SPEC-SCNC: 9.6 MG/DL (ref 8.4–10.4)
CHLORIDE SERPL-SCNC: 93 MMOL/L (ref 98–110)
CO2 SERPL-SCNC: 35 MMOL/L (ref 24–31)
CREAT BLD-MCNC: 0.95 MG/DL (ref 0.5–1.4)
DEPRECATED RDW RBC AUTO: 45.1 FL (ref 40–54)
EOSINOPHIL # BLD AUTO: 0.06 10*3/MM3 (ref 0–0.7)
EOSINOPHIL NFR BLD AUTO: 0.4 % (ref 0–4)
ERYTHROCYTE [DISTWIDTH] IN BLOOD BY AUTOMATED COUNT: 12.9 % (ref 12–15)
GFR SERPL CREATININE-BSD FRML MDRD: 56 ML/MIN/1.73
GLUCOSE BLD-MCNC: 119 MG/DL (ref 70–100)
HCT VFR BLD AUTO: 35.8 % (ref 37–47)
HGB BLD-MCNC: 11.9 G/DL (ref 12–16)
IMM GRANULOCYTES # BLD: 0.14 10*3/MM3 (ref 0–0.03)
IMM GRANULOCYTES NFR BLD: 0.9 % (ref 0–5)
INR PPP: 0.97 (ref 0.91–1.09)
LYMPHOCYTES # BLD AUTO: 1.65 10*3/MM3 (ref 0.72–4.86)
LYMPHOCYTES NFR BLD AUTO: 11.1 % (ref 15–45)
MCH RBC QN AUTO: 32 PG (ref 28–32)
MCHC RBC AUTO-ENTMCNC: 33.2 G/DL (ref 33–36)
MCV RBC AUTO: 96.2 FL (ref 82–98)
MONOCYTES # BLD AUTO: 0.9 10*3/MM3 (ref 0.19–1.3)
MONOCYTES NFR BLD AUTO: 6.1 % (ref 4–12)
NEUTROPHILS # BLD AUTO: 12.05 10*3/MM3 (ref 1.87–8.4)
NEUTROPHILS NFR BLD AUTO: 81.4 % (ref 39–78)
PLATELET # BLD AUTO: 222 10*3/MM3 (ref 130–400)
PMV BLD AUTO: 9.4 FL (ref 6–12)
POTASSIUM BLD-SCNC: 3.1 MMOL/L (ref 3.5–5.3)
PROTHROMBIN TIME: 13.2 SECONDS (ref 11.9–14.6)
RBC # BLD AUTO: 3.72 10*6/MM3 (ref 4.2–5.4)
SODIUM BLD-SCNC: 136 MMOL/L (ref 135–145)
TSH SERPL DL<=0.05 MIU/L-ACNC: 0.06 MIU/ML (ref 0.47–4.68)
WBC NRBC COR # BLD: 14.81 10*3/MM3 (ref 4.8–10.8)

## 2017-11-22 PROCEDURE — 71010 HC CHEST PA OR AP: CPT

## 2017-11-22 PROCEDURE — 72220 X-RAY EXAM SACRUM TAILBONE: CPT

## 2017-11-22 PROCEDURE — 72110 X-RAY EXAM L-2 SPINE 4/>VWS: CPT

## 2017-11-22 PROCEDURE — 80048 BASIC METABOLIC PNL TOTAL CA: CPT | Performed by: EMERGENCY MEDICINE

## 2017-11-22 PROCEDURE — 99285 EMERGENCY DEPT VISIT HI MDM: CPT

## 2017-11-22 PROCEDURE — 72192 CT PELVIS W/O DYE: CPT

## 2017-11-22 PROCEDURE — 25010000002 HYDROMORPHONE PER 4 MG: Performed by: EMERGENCY MEDICINE

## 2017-11-22 PROCEDURE — 84443 ASSAY THYROID STIM HORMONE: CPT | Performed by: EMERGENCY MEDICINE

## 2017-11-22 PROCEDURE — 85610 PROTHROMBIN TIME: CPT | Performed by: EMERGENCY MEDICINE

## 2017-11-22 PROCEDURE — 25010000002 FENTANYL CITRATE (PF) 100 MCG/2ML SOLUTION: Performed by: EMERGENCY MEDICINE

## 2017-11-22 PROCEDURE — 73560 X-RAY EXAM OF KNEE 1 OR 2: CPT

## 2017-11-22 PROCEDURE — 73502 X-RAY EXAM HIP UNI 2-3 VIEWS: CPT

## 2017-11-22 PROCEDURE — P9612 CATHETERIZE FOR URINE SPEC: HCPCS

## 2017-11-22 PROCEDURE — 85025 COMPLETE CBC W/AUTO DIFF WBC: CPT | Performed by: EMERGENCY MEDICINE

## 2017-11-22 RX ORDER — FENTANYL CITRATE 50 UG/ML
25 INJECTION, SOLUTION INTRAMUSCULAR; INTRAVENOUS ONCE
Status: COMPLETED | OUTPATIENT
Start: 2017-11-22 | End: 2017-11-22

## 2017-11-22 RX ORDER — POTASSIUM CHLORIDE 750 MG/1
40 CAPSULE, EXTENDED RELEASE ORAL ONCE
Status: COMPLETED | OUTPATIENT
Start: 2017-11-22 | End: 2017-11-22

## 2017-11-22 RX ORDER — POTASSIUM CHLORIDE 20 MEQ/1
40 TABLET, EXTENDED RELEASE ORAL ONCE
Status: DISCONTINUED | OUTPATIENT
Start: 2017-11-22 | End: 2017-11-22 | Stop reason: SDUPTHER

## 2017-11-22 RX ADMIN — HYDROMORPHONE HYDROCHLORIDE 0.5 MG: 1 INJECTION, SOLUTION INTRAMUSCULAR; INTRAVENOUS; SUBCUTANEOUS at 23:55

## 2017-11-22 RX ADMIN — FENTANYL CITRATE 25 MCG: 50 INJECTION, SOLUTION INTRAMUSCULAR; INTRAVENOUS at 21:12

## 2017-11-22 RX ADMIN — POTASSIUM CHLORIDE 40 MEQ: 750 CAPSULE, EXTENDED RELEASE ORAL at 22:32
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
## 2017-11-23 PROBLEM — S32.810A MULTIPLE FRACTURES OF PELVIS WITH STABLE DISRUPTION OF PELVIC RING, INITIAL ENCOUNTER FOR CLOSED FRACTURE (HCC): Status: ACTIVE | Noted: 2017-11-23

## 2017-11-23 LAB
ALBUMIN SERPL-MCNC: 3.7 G/DL (ref 3.5–5)
ALBUMIN/GLOB SERPL: 1.2 G/DL (ref 1.1–2.5)
ALP SERPL-CCNC: 56 U/L (ref 24–120)
ALT SERPL W P-5'-P-CCNC: 30 U/L (ref 0–54)
ANION GAP SERPL CALCULATED.3IONS-SCNC: 10 MMOL/L (ref 4–13)
ARTICHOKE IGE QN: 46 MG/DL (ref 0–99)
AST SERPL-CCNC: 39 U/L (ref 7–45)
BASOPHILS # BLD AUTO: 0.02 10*3/MM3 (ref 0–0.2)
BASOPHILS NFR BLD AUTO: 0.2 % (ref 0–2)
BILIRUB SERPL-MCNC: 0.6 MG/DL (ref 0.1–1)
BILIRUB UR QL STRIP: NEGATIVE
BUN BLD-MCNC: 28 MG/DL (ref 5–21)
BUN/CREAT SERPL: 33.7 (ref 7–25)
CALCIUM SPEC-SCNC: 9.2 MG/DL (ref 8.4–10.4)
CHLORIDE SERPL-SCNC: 96 MMOL/L (ref 98–110)
CHOLEST SERPL-MCNC: 122 MG/DL (ref 130–200)
CLARITY UR: CLEAR
CO2 SERPL-SCNC: 31 MMOL/L (ref 24–31)
COLOR UR: YELLOW
CREAT BLD-MCNC: 0.83 MG/DL (ref 0.5–1.4)
D-LACTATE SERPL-SCNC: 1.4 MMOL/L (ref 0.5–2)
DEPRECATED RDW RBC AUTO: 45.5 FL (ref 40–54)
EOSINOPHIL # BLD AUTO: 0.07 10*3/MM3 (ref 0–0.7)
EOSINOPHIL NFR BLD AUTO: 0.8 % (ref 0–4)
ERYTHROCYTE [DISTWIDTH] IN BLOOD BY AUTOMATED COUNT: 13.1 % (ref 12–15)
GFR SERPL CREATININE-BSD FRML MDRD: 65 ML/MIN/1.73
GLOBULIN UR ELPH-MCNC: 3 GM/DL
GLUCOSE BLD-MCNC: 137 MG/DL (ref 70–100)
GLUCOSE BLDC GLUCOMTR-MCNC: 126 MG/DL (ref 70–130)
GLUCOSE UR STRIP-MCNC: NEGATIVE MG/DL
HCT VFR BLD AUTO: 33.5 % (ref 37–47)
HDLC SERPL-MCNC: 61 MG/DL
HGB BLD-MCNC: 11.1 G/DL (ref 12–16)
HGB UR QL STRIP.AUTO: NEGATIVE
IMM GRANULOCYTES # BLD: 0.05 10*3/MM3 (ref 0–0.03)
IMM GRANULOCYTES NFR BLD: 0.5 % (ref 0–5)
KETONES UR QL STRIP: NEGATIVE
LDLC/HDLC SERPL: 0.75 {RATIO}
LEUKOCYTE ESTERASE UR QL STRIP.AUTO: NEGATIVE
LYMPHOCYTES # BLD AUTO: 1.53 10*3/MM3 (ref 0.72–4.86)
LYMPHOCYTES NFR BLD AUTO: 16.8 % (ref 15–45)
MAGNESIUM SERPL-MCNC: 1.9 MG/DL (ref 1.4–2.2)
MCH RBC QN AUTO: 32 PG (ref 28–32)
MCHC RBC AUTO-ENTMCNC: 33.1 G/DL (ref 33–36)
MCV RBC AUTO: 96.5 FL (ref 82–98)
MONOCYTES # BLD AUTO: 0.83 10*3/MM3 (ref 0.19–1.3)
MONOCYTES NFR BLD AUTO: 9.1 % (ref 4–12)
NEUTROPHILS # BLD AUTO: 6.6 10*3/MM3 (ref 1.87–8.4)
NEUTROPHILS NFR BLD AUTO: 72.6 % (ref 39–78)
NITRITE UR QL STRIP: NEGATIVE
NRBC BLD MANUAL-RTO: 0 /100 WBC (ref 0–0)
PH UR STRIP.AUTO: 5.5 [PH] (ref 5–8)
PHOSPHATE SERPL-MCNC: 3.1 MG/DL (ref 2.5–4.5)
PLATELET # BLD AUTO: 205 10*3/MM3 (ref 130–400)
PMV BLD AUTO: 9.6 FL (ref 6–12)
POTASSIUM BLD-SCNC: 3.8 MMOL/L (ref 3.5–5.3)
PROT SERPL-MCNC: 6.7 G/DL (ref 6.3–8.7)
PROT UR QL STRIP: NEGATIVE
RBC # BLD AUTO: 3.47 10*6/MM3 (ref 4.2–5.4)
SODIUM BLD-SCNC: 137 MMOL/L (ref 135–145)
SP GR UR STRIP: 1.02 (ref 1–1.03)
TRIGL SERPL-MCNC: 75 MG/DL (ref 0–149)
TROPONIN I SERPL-MCNC: <0.012 NG/ML (ref 0–0.03)
UROBILINOGEN UR QL STRIP: NORMAL
WBC NRBC COR # BLD: 9.1 10*3/MM3 (ref 4.8–10.8)

## 2017-11-23 PROCEDURE — 87040 BLOOD CULTURE FOR BACTERIA: CPT | Performed by: FAMILY MEDICINE

## 2017-11-23 PROCEDURE — 84484 ASSAY OF TROPONIN QUANT: CPT | Performed by: FAMILY MEDICINE

## 2017-11-23 PROCEDURE — 85025 COMPLETE CBC W/AUTO DIFF WBC: CPT | Performed by: FAMILY MEDICINE

## 2017-11-23 PROCEDURE — 82962 GLUCOSE BLOOD TEST: CPT

## 2017-11-23 PROCEDURE — 81003 URINALYSIS AUTO W/O SCOPE: CPT | Performed by: EMERGENCY MEDICINE

## 2017-11-23 PROCEDURE — 83605 ASSAY OF LACTIC ACID: CPT | Performed by: FAMILY MEDICINE

## 2017-11-23 PROCEDURE — 80053 COMPREHEN METABOLIC PANEL: CPT | Performed by: FAMILY MEDICINE

## 2017-11-23 PROCEDURE — 25010000002 HYDROMORPHONE PER 4 MG: Performed by: FAMILY MEDICINE

## 2017-11-23 PROCEDURE — G0378 HOSPITAL OBSERVATION PER HR: HCPCS

## 2017-11-23 PROCEDURE — 84100 ASSAY OF PHOSPHORUS: CPT | Performed by: FAMILY MEDICINE

## 2017-11-23 PROCEDURE — 80061 LIPID PANEL: CPT | Performed by: FAMILY MEDICINE

## 2017-11-23 PROCEDURE — 83735 ASSAY OF MAGNESIUM: CPT | Performed by: FAMILY MEDICINE

## 2017-11-23 RX ORDER — DILTIAZEM HYDROCHLORIDE 180 MG/1
180 CAPSULE, COATED, EXTENDED RELEASE ORAL
Status: DISCONTINUED | OUTPATIENT
Start: 2017-11-23 | End: 2017-11-28 | Stop reason: HOSPADM

## 2017-11-23 RX ORDER — NALOXONE HCL 0.4 MG/ML
0.4 VIAL (ML) INJECTION
Status: DISCONTINUED | OUTPATIENT
Start: 2017-11-23 | End: 2017-11-28 | Stop reason: HOSPADM

## 2017-11-23 RX ORDER — SODIUM CHLORIDE 9 MG/ML
75 INJECTION, SOLUTION INTRAVENOUS CONTINUOUS
Status: DISCONTINUED | OUTPATIENT
Start: 2017-11-23 | End: 2017-11-25

## 2017-11-23 RX ORDER — ATORVASTATIN CALCIUM 10 MG/1
10 TABLET, FILM COATED ORAL NIGHTLY
Status: DISCONTINUED | OUTPATIENT
Start: 2017-11-23 | End: 2017-11-28 | Stop reason: HOSPADM

## 2017-11-23 RX ORDER — SODIUM CHLORIDE 0.9 % (FLUSH) 0.9 %
1-10 SYRINGE (ML) INJECTION AS NEEDED
Status: DISCONTINUED | OUTPATIENT
Start: 2017-11-23 | End: 2017-11-28 | Stop reason: HOSPADM

## 2017-11-23 RX ORDER — DEXTROSE MONOHYDRATE 25 G/50ML
25 INJECTION, SOLUTION INTRAVENOUS
Status: DISCONTINUED | OUTPATIENT
Start: 2017-11-23 | End: 2017-11-28 | Stop reason: HOSPADM

## 2017-11-23 RX ORDER — NALOXONE HCL 0.4 MG/ML
0.4 VIAL (ML) INJECTION
Status: DISCONTINUED | OUTPATIENT
Start: 2017-11-23 | End: 2017-11-23

## 2017-11-23 RX ORDER — FAMOTIDINE 20 MG/1
40 TABLET, FILM COATED ORAL DAILY
Status: DISCONTINUED | OUTPATIENT
Start: 2017-11-23 | End: 2017-11-23 | Stop reason: DRUGHIGH

## 2017-11-23 RX ORDER — FUROSEMIDE 20 MG/1
20 TABLET ORAL DAILY
Status: DISCONTINUED | OUTPATIENT
Start: 2017-11-23 | End: 2017-11-28 | Stop reason: HOSPADM

## 2017-11-23 RX ORDER — CETIRIZINE HYDROCHLORIDE 5 MG/1
5 TABLET ORAL DAILY
Status: DISCONTINUED | OUTPATIENT
Start: 2017-11-23 | End: 2017-11-28 | Stop reason: HOSPADM

## 2017-11-23 RX ORDER — LEVOTHYROXINE SODIUM 0.05 MG/1
50 TABLET ORAL
Status: DISCONTINUED | OUTPATIENT
Start: 2017-11-23 | End: 2017-11-23

## 2017-11-23 RX ORDER — ONDANSETRON 2 MG/ML
4 INJECTION INTRAMUSCULAR; INTRAVENOUS EVERY 6 HOURS PRN
Status: DISCONTINUED | OUTPATIENT
Start: 2017-11-23 | End: 2017-11-28 | Stop reason: HOSPADM

## 2017-11-23 RX ORDER — VALSARTAN AND HYDROCHLOROTHIAZIDE 160; 12.5 MG/1; MG/1
1 TABLET, FILM COATED ORAL
Status: DISCONTINUED | OUTPATIENT
Start: 2017-11-23 | End: 2017-11-27

## 2017-11-23 RX ORDER — NICOTINE POLACRILEX 4 MG
15 LOZENGE BUCCAL
Status: DISCONTINUED | OUTPATIENT
Start: 2017-11-23 | End: 2017-11-28 | Stop reason: HOSPADM

## 2017-11-23 RX ORDER — PANTOPRAZOLE SODIUM 40 MG/1
40 TABLET, DELAYED RELEASE ORAL
Status: DISCONTINUED | OUTPATIENT
Start: 2017-11-23 | End: 2017-11-28 | Stop reason: HOSPADM

## 2017-11-23 RX ORDER — HYDROCODONE BITARTRATE AND ACETAMINOPHEN 10; 325 MG/1; MG/1
1 TABLET ORAL EVERY 6 HOURS PRN
Status: DISCONTINUED | OUTPATIENT
Start: 2017-11-23 | End: 2017-11-25

## 2017-11-23 RX ORDER — FAMOTIDINE 20 MG/1
20 TABLET, FILM COATED ORAL DAILY
Status: DISCONTINUED | OUTPATIENT
Start: 2017-11-23 | End: 2017-11-28 | Stop reason: HOSPADM

## 2017-11-23 RX ADMIN — LEVOTHYROXINE SODIUM 50 MCG: 50 TABLET ORAL at 06:30

## 2017-11-23 RX ADMIN — FAMOTIDINE 20 MG: 20 TABLET, FILM COATED ORAL at 09:13

## 2017-11-23 RX ADMIN — SODIUM CHLORIDE 75 ML/HR: 9 INJECTION, SOLUTION INTRAVENOUS at 04:38

## 2017-11-23 RX ADMIN — HYDROMORPHONE HYDROCHLORIDE 1 MG: 1 INJECTION, SOLUTION INTRAMUSCULAR; INTRAVENOUS; SUBCUTANEOUS at 06:29

## 2017-11-23 RX ADMIN — CETIRIZINE HYDROCHLORIDE 5 MG: 5 TABLET ORAL at 09:13

## 2017-11-23 RX ADMIN — HYDROCODONE BITARTRATE AND ACETAMINOPHEN 1 TABLET: 10; 325 TABLET ORAL at 16:37

## 2017-11-23 RX ADMIN — FUROSEMIDE 20 MG: 20 TABLET ORAL at 09:13

## 2017-11-23 RX ADMIN — PANTOPRAZOLE SODIUM 40 MG: 40 TABLET, DELAYED RELEASE ORAL at 06:31

## 2017-11-23 RX ADMIN — SODIUM CHLORIDE 75 ML/HR: 9 INJECTION, SOLUTION INTRAVENOUS at 16:40

## 2017-11-23 RX ADMIN — VALSARTAN AND HYDROCHLOROTHIAZIDE 1 TABLET: 160; 12.5 TABLET, FILM COATED ORAL at 09:13

## 2017-11-23 RX ADMIN — DILTIAZEM HYDROCHLORIDE 180 MG: 180 CAPSULE, COATED, EXTENDED RELEASE ORAL at 06:31

## 2017-11-23 RX ADMIN — HYDROMORPHONE HYDROCHLORIDE 1 MG: 1 INJECTION, SOLUTION INTRAMUSCULAR; INTRAVENOUS; SUBCUTANEOUS at 20:38

## 2017-11-23 RX ADMIN — ATORVASTATIN CALCIUM 10 MG: 10 TABLET, FILM COATED ORAL at 20:37

## 2017-11-23 RX ADMIN — HYDROCODONE BITARTRATE AND ACETAMINOPHEN 1 TABLET: 10; 325 TABLET ORAL at 09:41

## 2017-11-24 ENCOUNTER — APPOINTMENT (OUTPATIENT)
Dept: GENERAL RADIOLOGY | Facility: HOSPITAL | Age: 82
End: 2017-11-24

## 2017-11-24 LAB
ANION GAP SERPL CALCULATED.3IONS-SCNC: 8 MMOL/L (ref 4–13)
BUN BLD-MCNC: 16 MG/DL (ref 5–21)
BUN/CREAT SERPL: 25 (ref 7–25)
CALCIUM SPEC-SCNC: 8.4 MG/DL (ref 8.4–10.4)
CHLORIDE SERPL-SCNC: 95 MMOL/L (ref 98–110)
CO2 SERPL-SCNC: 30 MMOL/L (ref 24–31)
CREAT BLD-MCNC: 0.64 MG/DL (ref 0.5–1.4)
GFR SERPL CREATININE-BSD FRML MDRD: 88 ML/MIN/1.73
GLUCOSE BLD-MCNC: 119 MG/DL (ref 70–100)
POTASSIUM BLD-SCNC: 3.9 MMOL/L (ref 3.5–5.3)
SODIUM BLD-SCNC: 133 MMOL/L (ref 135–145)

## 2017-11-24 PROCEDURE — 97530 THERAPEUTIC ACTIVITIES: CPT

## 2017-11-24 PROCEDURE — 97165 OT EVAL LOW COMPLEX 30 MIN: CPT

## 2017-11-24 PROCEDURE — G8978 MOBILITY CURRENT STATUS: HCPCS | Performed by: PHYSICAL THERAPIST

## 2017-11-24 PROCEDURE — 93010 ELECTROCARDIOGRAM REPORT: CPT | Performed by: INTERNAL MEDICINE

## 2017-11-24 PROCEDURE — 97162 PT EVAL MOD COMPLEX 30 MIN: CPT | Performed by: PHYSICAL THERAPIST

## 2017-11-24 PROCEDURE — 25010000002 ONDANSETRON PER 1 MG: Performed by: FAMILY MEDICINE

## 2017-11-24 PROCEDURE — G8988 SELF CARE GOAL STATUS: HCPCS

## 2017-11-24 PROCEDURE — G0378 HOSPITAL OBSERVATION PER HR: HCPCS

## 2017-11-24 PROCEDURE — G8987 SELF CARE CURRENT STATUS: HCPCS

## 2017-11-24 PROCEDURE — 71010 HC CHEST PA OR AP: CPT

## 2017-11-24 PROCEDURE — 02HV33Z INSERTION OF INFUSION DEVICE INTO SUPERIOR VENA CAVA, PERCUTANEOUS APPROACH: ICD-10-PCS | Performed by: INTERNAL MEDICINE

## 2017-11-24 PROCEDURE — G8979 MOBILITY GOAL STATUS: HCPCS | Performed by: PHYSICAL THERAPIST

## 2017-11-24 PROCEDURE — 93005 ELECTROCARDIOGRAM TRACING: CPT | Performed by: FAMILY MEDICINE

## 2017-11-24 PROCEDURE — 25010000002 HYDROMORPHONE PER 4 MG: Performed by: FAMILY MEDICINE

## 2017-11-24 PROCEDURE — 80048 BASIC METABOLIC PNL TOTAL CA: CPT | Performed by: INTERNAL MEDICINE

## 2017-11-24 RX ORDER — BISACODYL 10 MG
10 SUPPOSITORY, RECTAL RECTAL DAILY PRN
Status: DISCONTINUED | OUTPATIENT
Start: 2017-11-24 | End: 2017-11-28 | Stop reason: HOSPADM

## 2017-11-24 RX ADMIN — HYDROMORPHONE HYDROCHLORIDE 1 MG: 1 INJECTION, SOLUTION INTRAMUSCULAR; INTRAVENOUS; SUBCUTANEOUS at 00:39

## 2017-11-24 RX ADMIN — ATORVASTATIN CALCIUM 10 MG: 10 TABLET, FILM COATED ORAL at 20:59

## 2017-11-24 RX ADMIN — HYDROCODONE BITARTRATE AND ACETAMINOPHEN 1 TABLET: 10; 325 TABLET ORAL at 02:45

## 2017-11-24 RX ADMIN — CETIRIZINE HYDROCHLORIDE 5 MG: 5 TABLET ORAL at 08:39

## 2017-11-24 RX ADMIN — HYDROCODONE BITARTRATE AND ACETAMINOPHEN 1 TABLET: 10; 325 TABLET ORAL at 13:47

## 2017-11-24 RX ADMIN — HYDROCODONE BITARTRATE AND ACETAMINOPHEN 1 TABLET: 10; 325 TABLET ORAL at 20:59

## 2017-11-24 RX ADMIN — ONDANSETRON 4 MG: 2 INJECTION, SOLUTION INTRAMUSCULAR; INTRAVENOUS at 17:17

## 2017-11-24 RX ADMIN — HYDROCODONE BITARTRATE AND ACETAMINOPHEN 1 TABLET: 10; 325 TABLET ORAL at 08:47

## 2017-11-24 RX ADMIN — FUROSEMIDE 20 MG: 20 TABLET ORAL at 08:38

## 2017-11-24 RX ADMIN — HYDROMORPHONE HYDROCHLORIDE 1 MG: 1 INJECTION, SOLUTION INTRAMUSCULAR; INTRAVENOUS; SUBCUTANEOUS at 23:38

## 2017-11-24 RX ADMIN — DILTIAZEM HYDROCHLORIDE 180 MG: 180 CAPSULE, COATED, EXTENDED RELEASE ORAL at 05:19

## 2017-11-24 RX ADMIN — VALSARTAN AND HYDROCHLOROTHIAZIDE 1 TABLET: 160; 12.5 TABLET, FILM COATED ORAL at 08:38

## 2017-11-24 RX ADMIN — PANTOPRAZOLE SODIUM 40 MG: 40 TABLET, DELAYED RELEASE ORAL at 05:19

## 2017-11-24 RX ADMIN — HYDROMORPHONE HYDROCHLORIDE 1 MG: 1 INJECTION, SOLUTION INTRAMUSCULAR; INTRAVENOUS; SUBCUTANEOUS at 11:42

## 2017-11-24 RX ADMIN — HYDROMORPHONE HYDROCHLORIDE 1 MG: 1 INJECTION, SOLUTION INTRAMUSCULAR; INTRAVENOUS; SUBCUTANEOUS at 16:11

## 2017-11-24 RX ADMIN — FAMOTIDINE 20 MG: 20 TABLET, FILM COATED ORAL at 08:38

## 2017-11-24 RX ADMIN — SODIUM CHLORIDE 75 ML/HR: 9 INJECTION, SOLUTION INTRAVENOUS at 21:59

## 2017-11-24 RX ADMIN — SODIUM CHLORIDE 75 ML/HR: 9 INJECTION, SOLUTION INTRAVENOUS at 05:44

## 2017-11-25 LAB
GLUCOSE BLDC GLUCOMTR-MCNC: 126 MG/DL (ref 70–130)
GLUCOSE BLDC GLUCOMTR-MCNC: 128 MG/DL (ref 70–130)
GLUCOSE BLDC GLUCOMTR-MCNC: 139 MG/DL (ref 70–130)
GLUCOSE BLDC GLUCOMTR-MCNC: 140 MG/DL (ref 70–130)

## 2017-11-25 PROCEDURE — 25010000002 HYDROMORPHONE PER 4 MG: Performed by: FAMILY MEDICINE

## 2017-11-25 PROCEDURE — 25010000002 ONDANSETRON PER 1 MG: Performed by: FAMILY MEDICINE

## 2017-11-25 PROCEDURE — 97530 THERAPEUTIC ACTIVITIES: CPT

## 2017-11-25 PROCEDURE — 82962 GLUCOSE BLOOD TEST: CPT

## 2017-11-25 RX ORDER — DOCUSATE SODIUM 100 MG/1
100 CAPSULE, LIQUID FILLED ORAL DAILY
Status: DISCONTINUED | OUTPATIENT
Start: 2017-11-25 | End: 2017-11-28 | Stop reason: HOSPADM

## 2017-11-25 RX ORDER — HYDROCODONE BITARTRATE AND ACETAMINOPHEN 10; 325 MG/1; MG/1
1 TABLET ORAL EVERY 4 HOURS PRN
Status: DISCONTINUED | OUTPATIENT
Start: 2017-11-25 | End: 2017-11-28 | Stop reason: HOSPADM

## 2017-11-25 RX ADMIN — VALSARTAN AND HYDROCHLOROTHIAZIDE 1 TABLET: 160; 12.5 TABLET, FILM COATED ORAL at 08:26

## 2017-11-25 RX ADMIN — BISACODYL 10 MG: 10 SUPPOSITORY RECTAL at 05:58

## 2017-11-25 RX ADMIN — DOCUSATE SODIUM 100 MG: 100 CAPSULE ORAL at 10:13

## 2017-11-25 RX ADMIN — ONDANSETRON 4 MG: 2 INJECTION, SOLUTION INTRAMUSCULAR; INTRAVENOUS at 09:25

## 2017-11-25 RX ADMIN — HYDROMORPHONE HYDROCHLORIDE 1 MG: 1 INJECTION, SOLUTION INTRAMUSCULAR; INTRAVENOUS; SUBCUTANEOUS at 08:27

## 2017-11-25 RX ADMIN — HYDROCODONE BITARTRATE AND ACETAMINOPHEN 1 TABLET: 10; 325 TABLET ORAL at 05:58

## 2017-11-25 RX ADMIN — CETIRIZINE HYDROCHLORIDE 5 MG: 5 TABLET ORAL at 08:26

## 2017-11-25 RX ADMIN — PANTOPRAZOLE SODIUM 40 MG: 40 TABLET, DELAYED RELEASE ORAL at 05:58

## 2017-11-25 RX ADMIN — DILTIAZEM HYDROCHLORIDE 180 MG: 180 CAPSULE, COATED, EXTENDED RELEASE ORAL at 06:13

## 2017-11-25 RX ADMIN — HYDROCODONE BITARTRATE AND ACETAMINOPHEN 1 TABLET: 10; 325 TABLET ORAL at 20:36

## 2017-11-25 RX ADMIN — HYDROCODONE BITARTRATE AND ACETAMINOPHEN 1 TABLET: 10; 325 TABLET ORAL at 10:13

## 2017-11-25 RX ADMIN — FAMOTIDINE 20 MG: 20 TABLET, FILM COATED ORAL at 08:26

## 2017-11-25 RX ADMIN — HYDROCODONE BITARTRATE AND ACETAMINOPHEN 1 TABLET: 10; 325 TABLET ORAL at 15:26

## 2017-11-25 RX ADMIN — FUROSEMIDE 20 MG: 20 TABLET ORAL at 08:26

## 2017-11-25 RX ADMIN — ATORVASTATIN CALCIUM 10 MG: 10 TABLET, FILM COATED ORAL at 20:36

## 2017-11-26 LAB
ANION GAP SERPL CALCULATED.3IONS-SCNC: 6 MMOL/L (ref 4–13)
BUN BLD-MCNC: 14 MG/DL (ref 5–21)
BUN/CREAT SERPL: 17.5 (ref 7–25)
CALCIUM SPEC-SCNC: 8.8 MG/DL (ref 8.4–10.4)
CHLORIDE SERPL-SCNC: 92 MMOL/L (ref 98–110)
CO2 SERPL-SCNC: 33 MMOL/L (ref 24–31)
CREAT BLD-MCNC: 0.8 MG/DL (ref 0.5–1.4)
GFR SERPL CREATININE-BSD FRML MDRD: 68 ML/MIN/1.73
GLUCOSE BLD-MCNC: 122 MG/DL (ref 70–100)
GLUCOSE BLDC GLUCOMTR-MCNC: 138 MG/DL (ref 70–130)
GLUCOSE BLDC GLUCOMTR-MCNC: 150 MG/DL (ref 70–130)
OSMOLALITY SERPL: 276 MOSM/KG (ref 289–308)
OSMOLALITY UR: 278 MOSM/KG (ref 601–850)
POTASSIUM BLD-SCNC: 3.5 MMOL/L (ref 3.5–5.3)
SODIUM BLD-SCNC: 131 MMOL/L (ref 135–145)
SODIUM UR-SCNC: 47 MMOL/L (ref 30–90)

## 2017-11-26 PROCEDURE — 97110 THERAPEUTIC EXERCISES: CPT

## 2017-11-26 PROCEDURE — 97530 THERAPEUTIC ACTIVITIES: CPT

## 2017-11-26 PROCEDURE — 82962 GLUCOSE BLOOD TEST: CPT

## 2017-11-26 PROCEDURE — 83930 ASSAY OF BLOOD OSMOLALITY: CPT | Performed by: INTERNAL MEDICINE

## 2017-11-26 PROCEDURE — 83935 ASSAY OF URINE OSMOLALITY: CPT | Performed by: INTERNAL MEDICINE

## 2017-11-26 PROCEDURE — 97116 GAIT TRAINING THERAPY: CPT

## 2017-11-26 PROCEDURE — 80048 BASIC METABOLIC PNL TOTAL CA: CPT | Performed by: INTERNAL MEDICINE

## 2017-11-26 PROCEDURE — 94799 UNLISTED PULMONARY SVC/PX: CPT

## 2017-11-26 PROCEDURE — 84300 ASSAY OF URINE SODIUM: CPT | Performed by: INTERNAL MEDICINE

## 2017-11-26 RX ORDER — OXYCODONE AND ACETAMINOPHEN 10; 325 MG/1; MG/1
1 TABLET ORAL EVERY 4 HOURS PRN
Status: DISCONTINUED | OUTPATIENT
Start: 2017-11-26 | End: 2017-11-28 | Stop reason: HOSPADM

## 2017-11-26 RX ADMIN — FAMOTIDINE 20 MG: 20 TABLET, FILM COATED ORAL at 09:36

## 2017-11-26 RX ADMIN — PANTOPRAZOLE SODIUM 40 MG: 40 TABLET, DELAYED RELEASE ORAL at 05:22

## 2017-11-26 RX ADMIN — HYDROCODONE BITARTRATE AND ACETAMINOPHEN 1 TABLET: 10; 325 TABLET ORAL at 01:44

## 2017-11-26 RX ADMIN — ATORVASTATIN CALCIUM 10 MG: 10 TABLET, FILM COATED ORAL at 20:28

## 2017-11-26 RX ADMIN — DOCUSATE SODIUM 100 MG: 100 CAPSULE ORAL at 09:36

## 2017-11-26 RX ADMIN — HYDROCODONE BITARTRATE AND ACETAMINOPHEN 1 TABLET: 10; 325 TABLET ORAL at 09:35

## 2017-11-26 RX ADMIN — FUROSEMIDE 20 MG: 20 TABLET ORAL at 09:37

## 2017-11-26 RX ADMIN — HYDROCODONE BITARTRATE AND ACETAMINOPHEN 1 TABLET: 10; 325 TABLET ORAL at 13:30

## 2017-11-26 RX ADMIN — HYDROCODONE BITARTRATE AND ACETAMINOPHEN 1 TABLET: 10; 325 TABLET ORAL at 05:22

## 2017-11-26 RX ADMIN — CETIRIZINE HYDROCHLORIDE 5 MG: 5 TABLET ORAL at 09:36

## 2017-11-26 RX ADMIN — VALSARTAN AND HYDROCHLOROTHIAZIDE 1 TABLET: 160; 12.5 TABLET, FILM COATED ORAL at 09:36

## 2017-11-26 RX ADMIN — DILTIAZEM HYDROCHLORIDE 180 MG: 180 CAPSULE, COATED, EXTENDED RELEASE ORAL at 05:22

## 2017-11-26 RX ADMIN — OXYCODONE HYDROCHLORIDE AND ACETAMINOPHEN 1 TABLET: 10; 325 TABLET ORAL at 17:55

## 2017-11-27 LAB
ANION GAP SERPL CALCULATED.3IONS-SCNC: 7 MMOL/L (ref 4–13)
BUN BLD-MCNC: 17 MG/DL (ref 5–21)
BUN/CREAT SERPL: 20.5 (ref 7–25)
CALCIUM SPEC-SCNC: 9.1 MG/DL (ref 8.4–10.4)
CHLORIDE SERPL-SCNC: 90 MMOL/L (ref 98–110)
CO2 SERPL-SCNC: 35 MMOL/L (ref 24–31)
CREAT BLD-MCNC: 0.83 MG/DL (ref 0.5–1.4)
GFR SERPL CREATININE-BSD FRML MDRD: 65 ML/MIN/1.73
GLUCOSE BLD-MCNC: 119 MG/DL (ref 70–100)
GLUCOSE BLDC GLUCOMTR-MCNC: 117 MG/DL (ref 70–130)
GLUCOSE BLDC GLUCOMTR-MCNC: 124 MG/DL (ref 70–130)
GLUCOSE BLDC GLUCOMTR-MCNC: 127 MG/DL (ref 70–130)
GLUCOSE BLDC GLUCOMTR-MCNC: 131 MG/DL (ref 70–130)
POTASSIUM BLD-SCNC: 3.7 MMOL/L (ref 3.5–5.3)
SODIUM BLD-SCNC: 132 MMOL/L (ref 135–145)

## 2017-11-27 PROCEDURE — 82962 GLUCOSE BLOOD TEST: CPT

## 2017-11-27 PROCEDURE — 97116 GAIT TRAINING THERAPY: CPT

## 2017-11-27 PROCEDURE — 25010000002 ENOXAPARIN PER 10 MG: Performed by: INTERNAL MEDICINE

## 2017-11-27 PROCEDURE — 80048 BASIC METABOLIC PNL TOTAL CA: CPT | Performed by: INTERNAL MEDICINE

## 2017-11-27 PROCEDURE — 97110 THERAPEUTIC EXERCISES: CPT

## 2017-11-27 PROCEDURE — 97530 THERAPEUTIC ACTIVITIES: CPT

## 2017-11-27 PROCEDURE — 97535 SELF CARE MNGMENT TRAINING: CPT

## 2017-11-27 RX ORDER — VALSARTAN 80 MG/1
160 TABLET ORAL
Status: DISCONTINUED | OUTPATIENT
Start: 2017-11-27 | End: 2017-11-28 | Stop reason: HOSPADM

## 2017-11-27 RX ORDER — POLYETHYLENE GLYCOL 3350 17 G/17G
17 POWDER, FOR SOLUTION ORAL DAILY
Status: DISCONTINUED | OUTPATIENT
Start: 2017-11-27 | End: 2017-11-28 | Stop reason: HOSPADM

## 2017-11-27 RX ADMIN — ENOXAPARIN SODIUM 30 MG: 30 INJECTION SUBCUTANEOUS at 17:07

## 2017-11-27 RX ADMIN — FAMOTIDINE 20 MG: 20 TABLET, FILM COATED ORAL at 08:37

## 2017-11-27 RX ADMIN — DOCUSATE SODIUM 100 MG: 100 CAPSULE ORAL at 08:37

## 2017-11-27 RX ADMIN — OXYCODONE HYDROCHLORIDE AND ACETAMINOPHEN 1 TABLET: 10; 325 TABLET ORAL at 10:20

## 2017-11-27 RX ADMIN — VALSARTAN AND HYDROCHLOROTHIAZIDE 1 TABLET: 160; 12.5 TABLET, FILM COATED ORAL at 08:37

## 2017-11-27 RX ADMIN — ATORVASTATIN CALCIUM 10 MG: 10 TABLET, FILM COATED ORAL at 20:39

## 2017-11-27 RX ADMIN — DILTIAZEM HYDROCHLORIDE 180 MG: 180 CAPSULE, COATED, EXTENDED RELEASE ORAL at 05:34

## 2017-11-27 RX ADMIN — OXYCODONE HYDROCHLORIDE AND ACETAMINOPHEN 1 TABLET: 10; 325 TABLET ORAL at 15:21

## 2017-11-27 RX ADMIN — OXYCODONE HYDROCHLORIDE AND ACETAMINOPHEN 1 TABLET: 10; 325 TABLET ORAL at 00:02

## 2017-11-27 RX ADMIN — POLYETHYLENE GLYCOL (3350) 17 G: 17 POWDER, FOR SOLUTION ORAL at 16:15

## 2017-11-27 RX ADMIN — PANTOPRAZOLE SODIUM 40 MG: 40 TABLET, DELAYED RELEASE ORAL at 05:34

## 2017-11-27 RX ADMIN — FUROSEMIDE 20 MG: 20 TABLET ORAL at 08:37

## 2017-11-27 RX ADMIN — OXYCODONE HYDROCHLORIDE AND ACETAMINOPHEN 1 TABLET: 10; 325 TABLET ORAL at 05:34

## 2017-11-27 RX ADMIN — OXYCODONE HYDROCHLORIDE AND ACETAMINOPHEN 1 TABLET: 10; 325 TABLET ORAL at 20:39

## 2017-11-27 RX ADMIN — CETIRIZINE HYDROCHLORIDE 5 MG: 5 TABLET ORAL at 08:37

## 2017-11-28 VITALS
RESPIRATION RATE: 20 BRPM | SYSTOLIC BLOOD PRESSURE: 175 MMHG | TEMPERATURE: 97.9 F | OXYGEN SATURATION: 96 % | DIASTOLIC BLOOD PRESSURE: 77 MMHG | HEART RATE: 81 BPM | WEIGHT: 200.4 LBS | HEIGHT: 65 IN | BODY MASS INDEX: 33.39 KG/M2

## 2017-11-28 PROBLEM — E87.1 HYPONATREMIA: Status: ACTIVE | Noted: 2017-11-28

## 2017-11-28 LAB
ANION GAP SERPL CALCULATED.3IONS-SCNC: 8 MMOL/L (ref 4–13)
BACTERIA SPEC AEROBE CULT: NORMAL
BACTERIA SPEC AEROBE CULT: NORMAL
BUN BLD-MCNC: 22 MG/DL (ref 5–21)
BUN/CREAT SERPL: 24.7 (ref 7–25)
CALCIUM SPEC-SCNC: 9.3 MG/DL (ref 8.4–10.4)
CHLORIDE SERPL-SCNC: 93 MMOL/L (ref 98–110)
CO2 SERPL-SCNC: 36 MMOL/L (ref 24–31)
CREAT BLD-MCNC: 0.89 MG/DL (ref 0.5–1.4)
GFR SERPL CREATININE-BSD FRML MDRD: 60 ML/MIN/1.73
GLUCOSE BLD-MCNC: 123 MG/DL (ref 70–100)
GLUCOSE BLDC GLUCOMTR-MCNC: 118 MG/DL (ref 70–130)
POTASSIUM BLD-SCNC: 3.7 MMOL/L (ref 3.5–5.3)
SODIUM BLD-SCNC: 137 MMOL/L (ref 135–145)
T4 FREE SERPL-MCNC: 1.71 NG/DL (ref 0.78–2.19)

## 2017-11-28 PROCEDURE — 84439 ASSAY OF FREE THYROXINE: CPT | Performed by: INTERNAL MEDICINE

## 2017-11-28 PROCEDURE — 25010000002 ONDANSETRON PER 1 MG: Performed by: FAMILY MEDICINE

## 2017-11-28 PROCEDURE — 82962 GLUCOSE BLOOD TEST: CPT

## 2017-11-28 PROCEDURE — 80048 BASIC METABOLIC PNL TOTAL CA: CPT | Performed by: INTERNAL MEDICINE

## 2017-11-28 RX ORDER — PSEUDOEPHEDRINE HCL 30 MG
100 TABLET ORAL 2 TIMES DAILY
Start: 2017-11-28

## 2017-11-28 RX ORDER — POLYETHYLENE GLYCOL 3350 17 G/17G
17 POWDER, FOR SOLUTION ORAL DAILY
Qty: 7 EACH
Start: 2017-11-28

## 2017-11-28 RX ORDER — BISACODYL 10 MG
10 SUPPOSITORY, RECTAL RECTAL DAILY PRN
Start: 2017-11-28 | End: 2018-05-24

## 2017-11-28 RX ORDER — OXYCODONE HYDROCHLORIDE AND ACETAMINOPHEN 5; 325 MG/1; MG/1
1 TABLET ORAL EVERY 6 HOURS PRN
Qty: 20 TABLET | Refills: 0 | Status: SHIPPED | OUTPATIENT
Start: 2017-11-28

## 2017-11-28 RX ORDER — VALSARTAN 160 MG/1
160 TABLET ORAL
Start: 2017-11-28 | End: 2020-05-28

## 2017-11-28 RX ADMIN — FAMOTIDINE 20 MG: 20 TABLET, FILM COATED ORAL at 09:16

## 2017-11-28 RX ADMIN — DILTIAZEM HYDROCHLORIDE 180 MG: 180 CAPSULE, COATED, EXTENDED RELEASE ORAL at 05:46

## 2017-11-28 RX ADMIN — VALSARTAN 160 MG: 80 TABLET ORAL at 09:16

## 2017-11-28 RX ADMIN — CETIRIZINE HYDROCHLORIDE 5 MG: 5 TABLET ORAL at 09:16

## 2017-11-28 RX ADMIN — FUROSEMIDE 20 MG: 20 TABLET ORAL at 09:16

## 2017-11-28 RX ADMIN — POLYETHYLENE GLYCOL (3350) 17 G: 17 POWDER, FOR SOLUTION ORAL at 09:16

## 2017-11-28 RX ADMIN — OXYCODONE HYDROCHLORIDE AND ACETAMINOPHEN 1 TABLET: 10; 325 TABLET ORAL at 05:46

## 2017-11-28 RX ADMIN — ONDANSETRON 4 MG: 2 INJECTION, SOLUTION INTRAMUSCULAR; INTRAVENOUS at 09:52

## 2017-11-28 RX ADMIN — DOCUSATE SODIUM 100 MG: 100 CAPSULE ORAL at 09:16

## 2017-11-28 RX ADMIN — OXYCODONE HYDROCHLORIDE AND ACETAMINOPHEN 1 TABLET: 10; 325 TABLET ORAL at 00:53

## 2017-11-28 RX ADMIN — PANTOPRAZOLE SODIUM 40 MG: 40 TABLET, DELAYED RELEASE ORAL at 07:46

## 2017-12-01 DIAGNOSIS — G89.29 CHRONIC MIDLINE LOW BACK PAIN WITHOUT SCIATICA: ICD-10-CM

## 2017-12-01 DIAGNOSIS — M54.50 CHRONIC MIDLINE LOW BACK PAIN WITHOUT SCIATICA: ICD-10-CM

## 2017-12-01 RX ORDER — OXYCODONE HYDROCHLORIDE AND ACETAMINOPHEN 5; 325 MG/1; MG/1
1 TABLET ORAL EVERY 6 HOURS PRN
Qty: 20 TABLET | Refills: 0 | Status: SHIPPED | OUTPATIENT
Start: 2017-12-01 | End: 2017-12-08

## 2017-12-06 RX ORDER — OXYCODONE AND ACETAMINOPHEN 7.5; 325 MG/1; MG/1
1 TABLET ORAL EVERY 6 HOURS PRN
Qty: 120 TABLET | Refills: 0 | Status: SHIPPED | OUTPATIENT
Start: 2017-12-06 | End: 2017-12-22 | Stop reason: SDUPTHER

## 2017-12-22 RX ORDER — OXYCODONE AND ACETAMINOPHEN 7.5; 325 MG/1; MG/1
1 TABLET ORAL EVERY 6 HOURS PRN
Qty: 60 TABLET | Refills: 0 | Status: SHIPPED | OUTPATIENT
Start: 2017-12-22 | End: 2018-01-04 | Stop reason: ALTCHOICE

## 2017-12-22 NOTE — TELEPHONE ENCOUNTER
Pt granddaughter came to  rx for percocet, she being discharge from [facility] today, Dr Kadeem Moe said to give her 2 weeks of medication and follow up with Figueroa Mitchell

## 2018-01-01 ENCOUNTER — HOSPITAL ENCOUNTER (EMERGENCY)
Facility: HOSPITAL | Age: 83
Discharge: HOME | End: 2018-01-01
Attending: EMERGENCY MEDICINE | Admitting: EMERGENCY MEDICINE

## 2018-01-01 ENCOUNTER — APPOINTMENT (OUTPATIENT)
Dept: GENERAL RADIOLOGY | Facility: HOSPITAL | Age: 83
End: 2018-01-01

## 2018-01-01 VITALS
SYSTOLIC BLOOD PRESSURE: 126 MMHG | WEIGHT: 191.5 LBS | BODY MASS INDEX: 32.69 KG/M2 | RESPIRATION RATE: 18 BRPM | HEIGHT: 64 IN | HEART RATE: 79 BPM | TEMPERATURE: 99.1 F | OXYGEN SATURATION: 95 % | DIASTOLIC BLOOD PRESSURE: 53 MMHG

## 2018-01-01 DIAGNOSIS — M54.5 LOW BACK PAIN, UNSPECIFIED BACK PAIN LATERALITY, UNSPECIFIED CHRONICITY, WITH SCIATICA PRESENCE UNSPECIFIED: ICD-10-CM

## 2018-01-01 DIAGNOSIS — M19.90 ARTHRITIS: Primary | ICD-10-CM

## 2018-01-01 PROCEDURE — 72110 X-RAY EXAM L-2 SPINE 4/>VWS: CPT

## 2018-01-01 PROCEDURE — 99284 EMERGENCY DEPT VISIT MOD MDM: CPT

## 2018-01-01 PROCEDURE — 73502 X-RAY EXAM HIP UNI 2-3 VIEWS: CPT

## 2018-01-01 RX ORDER — HYDROCODONE BITARTRATE AND ACETAMINOPHEN 10; 325 MG/1; MG/1
1 TABLET ORAL ONCE
Status: COMPLETED | OUTPATIENT
Start: 2018-01-01 | End: 2018-01-01

## 2018-01-01 RX ADMIN — HYDROCODONE BITARTRATE AND ACETAMINOPHEN 1 TABLET: 10; 325 TABLET ORAL at 13:06

## 2018-01-01 NOTE — ED PROVIDER NOTES
Subjective   HPI Comments: Patient is an 84-year-old female who presents with pain in her pelvis, right hip, and back.  Patient had a pelvis fracture 6 weeks ago and was at nursing home for recovery and went home last Friday.  Was doing well until yesterday when she noted acute onset of pain in her pelvis, right hip, and back.  Denies any new trauma.  She has been able to ambulate well up until yesterday when the pain started.  She does take Lortab with some relief.  Pain is constant and worse with movements.  No radiation of pain.  Denies any numbness or weakness of her legs.  No fevers.  Patient did have a right hip arthroplasty several years ago as well.      History provided by:  Patient      Review of Systems   Constitutional: Negative for fever.   HENT: Negative for sore throat.    Eyes: Negative for visual disturbance.   Respiratory: Negative for shortness of breath.    Cardiovascular: Negative for chest pain.   Gastrointestinal: Negative for abdominal pain.   Genitourinary: Negative for hematuria.   Musculoskeletal: Negative for back pain.   Skin: Negative for rash.   Neurological: Negative for headaches.       Past Medical History:   Diagnosis Date   • CAD in native artery    • Central sleep apnea    • Chest pain     Other   • Fatigue    • Hip fracture     left   • Hyperlipidemia    • Hypertension    • Hypothyroidism, adult    • Obstructive sleep apnea, adult    • Peripheral neuropathy    • Peripheral neuropathy    • SOB (shortness of breath)        Allergies   Allergen Reactions   • Naproxen        Past Surgical History:   Procedure Laterality Date   • APPENDECTOMY     • BACK SURGERY  05/05/2011    Low Back surgery   • BRAIN SURGERY  05/05/2011   • BRAIN SURGERY     • CHOLECYSTECTOMY     • HYSTERECTOMY  05/05/2011    Total   • JOINT REPLACEMENT Right        Family History   Problem Relation Age of Onset   • Coronary artery disease Father    • Hypertension Father    • Stroke Mother    • Stroke Brother    •  Lung disease Brother        Social History     Social History   • Marital status:      Spouse name: N/A   • Number of children: N/A   • Years of education: N/A     Social History Main Topics   • Smoking status: Former Smoker     Quit date: 1973   • Smokeless tobacco: Never Used      Comment: Quit 1973   • Alcohol use No   • Drug use: No   • Sexual activity: Defer     Other Topics Concern   • None     Social History Narrative   • None       Lab Results (last 24 hours)     ** No results found for the last 24 hours. **          Objective   Physical Exam   Constitutional: She is oriented to person, place, and time. She appears well-nourished. No distress.   HENT:   Head: Atraumatic.   Mouth/Throat: Oropharynx is clear and moist.   Eyes: EOM are normal. Pupils are equal, round, and reactive to light.   Neck: Normal range of motion. Neck supple.   Cardiovascular: Normal rate, regular rhythm and normal heart sounds.  Exam reveals no gallop and no friction rub.    No murmur heard.  Pulmonary/Chest: Effort normal and breath sounds normal. No respiratory distress.   Abdominal: Soft. There is no tenderness.   Musculoskeletal: She exhibits no edema.        Right hip: She exhibits decreased range of motion and tenderness. She exhibits no swelling and no deformity.        Left hip: She exhibits no tenderness, no bony tenderness, no swelling and no deformity.        Right knee: Normal.        Left knee: Normal.        Right ankle: She exhibits normal pulse.        Left ankle: She exhibits normal pulse.        Cervical back: Normal.        Thoracic back: Normal.        Lumbar back: She exhibits tenderness.        Right foot: There is normal capillary refill.        Left foot: There is normal capillary refill.   Neurological: She is alert and oriented to person, place, and time.   Skin: Skin is warm and dry. She is not diaphoretic.   Psychiatric: She has a normal mood and affect.   Nursing note and vitals  "reviewed.      Procedures         XR Spine Lumbar 4+ View   Final Result   Advanced degenerative changes, including grade 1   spondylolisthesis at L4-5. No fracture or acute findings are identified.   The appearance is similar to the previous study.   This report was finalized on 01/01/2018 13:17 by Dr. Celio Galindo MD.      XR Hip With or Without Pelvis 2 - 3 View Left   Final Result   No acute findings, diffuse osteopenia and evidence of   previous right hip replacement. Degenerative changes as described. There   are healed fractures of the left pubic ring.   This report was finalized on 01/01/2018 13:15 by Dr. Celio Galindo MD.          /61 (BP Location: Right arm, Patient Position: Lying)  Pulse 82  Temp 97.9 °F (36.6 °C) (Oral)   Resp 20  Ht 162.6 cm (64\")  Wt 86.9 kg (191 lb 8 oz)  SpO2 (!) 89%  BMI 32.87 kg/m2    ED Course    ED Course   Comment By Time   This is an 84-year-old female who presents with low back pain and hip pain.  Plain films positive for arthritis but healed old fractures.  Neurovascular intact.  Patient is able to ambulate on her own.  I did have an extensive discussion with the patient and her family.  I did discuss options include going back to [facility] where patient was recently.  She is adamant she does not want to return there.  She does want to go home.  After talking about this with her family, they are comfortable with her going home.  They do understand this may only be temporary and she may end up needing placement soon, however we will allow her to go home at this time.  She does have arthritis throughout her joints but does have follow-up this week. Noel Rodriguez MD 01/01 1341       Medications   HYDROcodone-acetaminophen (NORCO)  MG per tablet 1 tablet (1 tablet Oral Given 1/1/18 1306)            MDM  Number of Diagnoses or Management Options  Arthritis: established and worsening  Low back pain, unspecified back pain " laterality, unspecified chronicity, with sciatica presence unspecified: established and worsening     Amount and/or Complexity of Data Reviewed  Tests in the radiology section of CPT®: reviewed and ordered    Risk of Complications, Morbidity, and/or Mortality  Presenting problems: low  Diagnostic procedures: low  Management options: low    Patient Progress  Patient progress: stable      Final diagnoses:   Arthritis   Low back pain, unspecified back pain laterality, unspecified chronicity, with sciatica presence unspecified          Noel Rodriguez MD  01/01/18 1346

## 2018-01-04 DIAGNOSIS — M15.9 PRIMARY OSTEOARTHRITIS INVOLVING MULTIPLE JOINTS: ICD-10-CM

## 2018-01-04 DIAGNOSIS — G89.29 CHRONIC MIDLINE LOW BACK PAIN WITHOUT SCIATICA: ICD-10-CM

## 2018-01-04 DIAGNOSIS — M54.50 CHRONIC MIDLINE LOW BACK PAIN WITHOUT SCIATICA: ICD-10-CM

## 2018-01-04 RX ORDER — HYDROCODONE BITARTRATE AND ACETAMINOPHEN 10; 325 MG/1; MG/1
1 TABLET ORAL EVERY 6 HOURS PRN
Qty: 120 TABLET | Refills: 0 | Status: SHIPPED | OUTPATIENT
Start: 2018-01-04 | End: 2018-01-11 | Stop reason: ALTCHOICE

## 2018-01-04 RX ORDER — SENNOSIDES 8.6 MG
650 CAPSULE ORAL EVERY 6 HOURS PRN
Qty: 120 TABLET | Refills: 3 | Status: SHIPPED | OUTPATIENT
Start: 2018-01-04 | End: 2018-05-23

## 2018-01-08 NOTE — ED NOTES
"ED Call Back Questions    1. How are you doing since leaving the Emergency Department?    Much better, no issues.  2. Do you have any questions about your discharge instructions? No     3. Have you filled your new prescriptions yet? N/A  a. Do you have any questions about those medications? N/A    4. Were you able to make a follow-up appointment with the physician? No     5. Do you have a primary care physician? Yes   a. If No, would you like for me to set you up with one? N/A  i. If Yes, “I will have our ED  give you a call right back at this number to work with you on the best time for an appointment.”    6. We are always looking to get better at what we do. Do you have any suggestions for what we can do to be even better? No   a. If Yes, \"Thank you for sharing your concerns. I apologize. I will follow up with our manager and patient . Would you like someone to call you back?\" N/A    7. Is there anything else I can do for you? Yes     "

## 2018-01-11 ENCOUNTER — OFFICE VISIT (OUTPATIENT)
Dept: FAMILY MEDICINE CLINIC | Age: 83
End: 2018-01-11
Payer: MEDICARE

## 2018-01-11 VITALS
OXYGEN SATURATION: 94 % | HEART RATE: 100 BPM | TEMPERATURE: 98.5 F | SYSTOLIC BLOOD PRESSURE: 118 MMHG | DIASTOLIC BLOOD PRESSURE: 62 MMHG

## 2018-01-11 DIAGNOSIS — E03.9 ACQUIRED HYPOTHYROIDISM: ICD-10-CM

## 2018-01-11 DIAGNOSIS — E78.1 HYPERTRIGLYCERIDEMIA: ICD-10-CM

## 2018-01-11 DIAGNOSIS — G89.29 CHRONIC MIDLINE LOW BACK PAIN WITHOUT SCIATICA: ICD-10-CM

## 2018-01-11 DIAGNOSIS — M54.50 CHRONIC MIDLINE LOW BACK PAIN WITHOUT SCIATICA: ICD-10-CM

## 2018-01-11 DIAGNOSIS — E55.9 VITAMIN D DEFICIENCY: ICD-10-CM

## 2018-01-11 DIAGNOSIS — K21.9 GASTROESOPHAGEAL REFLUX DISEASE WITHOUT ESOPHAGITIS: ICD-10-CM

## 2018-01-11 DIAGNOSIS — S32.592D: Primary | ICD-10-CM

## 2018-01-11 DIAGNOSIS — M15.9 PRIMARY OSTEOARTHRITIS INVOLVING MULTIPLE JOINTS: ICD-10-CM

## 2018-01-11 DIAGNOSIS — I10 ESSENTIAL HYPERTENSION: ICD-10-CM

## 2018-01-11 PROCEDURE — 1090F PRES/ABSN URINE INCON ASSESS: CPT | Performed by: FAMILY MEDICINE

## 2018-01-11 PROCEDURE — 99214 OFFICE O/P EST MOD 30 MIN: CPT | Performed by: FAMILY MEDICINE

## 2018-01-11 PROCEDURE — 1123F ACP DISCUSS/DSCN MKR DOCD: CPT | Performed by: FAMILY MEDICINE

## 2018-01-11 PROCEDURE — G8417 CALC BMI ABV UP PARAM F/U: HCPCS | Performed by: FAMILY MEDICINE

## 2018-01-11 PROCEDURE — 4040F PNEUMOC VAC/ADMIN/RCVD: CPT | Performed by: FAMILY MEDICINE

## 2018-01-11 PROCEDURE — 96372 THER/PROPH/DIAG INJ SC/IM: CPT | Performed by: FAMILY MEDICINE

## 2018-01-11 PROCEDURE — G8484 FLU IMMUNIZE NO ADMIN: HCPCS | Performed by: FAMILY MEDICINE

## 2018-01-11 PROCEDURE — G8399 PT W/DXA RESULTS DOCUMENT: HCPCS | Performed by: FAMILY MEDICINE

## 2018-01-11 PROCEDURE — 1036F TOBACCO NON-USER: CPT | Performed by: FAMILY MEDICINE

## 2018-01-11 PROCEDURE — G8427 DOCREV CUR MEDS BY ELIG CLIN: HCPCS | Performed by: FAMILY MEDICINE

## 2018-01-11 RX ORDER — SUCRALFATE 1 G/1
1 TABLET ORAL DAILY
Qty: 30 TABLET | Refills: 5 | Status: SHIPPED | OUTPATIENT
Start: 2018-01-11 | End: 2018-07-03 | Stop reason: SDUPTHER

## 2018-01-11 RX ORDER — OXYCODONE AND ACETAMINOPHEN 10; 325 MG/1; MG/1
1 TABLET ORAL EVERY 8 HOURS PRN
Qty: 90 TABLET | Refills: 0 | Status: SHIPPED | OUTPATIENT
Start: 2018-01-11 | End: 2018-02-08 | Stop reason: SDUPTHER

## 2018-01-11 RX ORDER — TRIAMCINOLONE ACETONIDE 40 MG/ML
80 INJECTION, SUSPENSION INTRA-ARTICULAR; INTRAMUSCULAR ONCE
Status: COMPLETED | OUTPATIENT
Start: 2018-01-11 | End: 2018-01-11

## 2018-01-11 RX ORDER — MELOXICAM 15 MG/1
15 TABLET ORAL DAILY
Qty: 30 TABLET | Refills: 5 | Status: SHIPPED | OUTPATIENT
Start: 2018-01-11 | End: 2020-04-06 | Stop reason: SDUPTHER

## 2018-01-11 RX ADMIN — TRIAMCINOLONE ACETONIDE 80 MG: 40 INJECTION, SUSPENSION INTRA-ARTICULAR; INTRAMUSCULAR at 15:50

## 2018-01-11 ASSESSMENT — ENCOUNTER SYMPTOMS
COUGH: 0
DIARRHEA: 0
EYES NEGATIVE: 1
VOMITING: 0
NAUSEA: 0
ALLERGIC/IMMUNOLOGIC NEGATIVE: 1
SHORTNESS OF BREATH: 0
WHEEZING: 0
CHEST TIGHTNESS: 0
CONSTIPATION: 0
BLOOD IN STOOL: 0

## 2018-01-11 NOTE — PROGRESS NOTES
Subjective:      Patient ID: Eber Lyles is a 80 y.o. female. Chief Complaint   Patient presents with    Arthritis     wants to get meloxicam back       HPI  This patient is seen here regularly by the undersigned for management of several chronic disease states. This patient is seen here regularly by the undersigned for management of several chronic disease states. She does suffer with chronic midline low back pain reportedly without sciatica in the past. She also has primary osteoarthritis involving multiple joints. It is noted that on 11/22/17 she did fall and sustained fracture involving the pelvis on the left. She had the closed fracture left side symptoms of pubis and also fracture of the sacral alar on the left. The patient was admitted to [facility] [place] on 11/22/117 and discharged finally on 11/28/17. She was sent to [address] [place] [address] for further management including rehabilitation. The patient was there for several weeks and then subsequently was discharged home. While in the nursing facility, she did have the significant amount of pain particularly in the left pelvic region. She was treated there with Percocet finally at 7.5 which did help initially but then began to help less and less. She was discharged from there on [address] 10/325 and states that the [address] simply is not getting it done. We therefore going to change her back to Percocet and use a 10 mg/325 up to every 8 hours as needed for severe pain. We will prescribe 90. The patient had been on no Voltaren orally but states that her stomach will not tolerate this. She did take mobile in the past and it is stated as an allergy though it was effective. The patient reportedly did have GI distress from it but once to try that again. We are going to stop the Voltaren and switch her to Aloxi CAM 15 mg by mouth instead but I will use with that 1 g of Carafate at the same time that she takes the Mobic.  Also, today I'm giving her I have refilled the following medication today:   Requested Prescriptions     Signed Prescriptions Disp Refills    meloxicam (MOBIC) 15 MG tablet 30 tablet 5     Sig: Take 1 tablet by mouth daily    sucralfate (CARAFATE) 1 GM tablet 30 tablet 5     Sig: Take 1 tablet by mouth daily Take with the meloxicam.    oxyCODONE-acetaminophen (PERCOCET)  MG per tablet 90 tablet 0     Sig: Take 1 tablet by mouth every 8 hours as needed for Pain for up to 30 days. Earliest Fill Date: 1/11/18   .

## 2018-02-08 ENCOUNTER — OFFICE VISIT (OUTPATIENT)
Dept: FAMILY MEDICINE CLINIC | Age: 83
End: 2018-02-08
Payer: MEDICARE

## 2018-02-08 VITALS
RESPIRATION RATE: 14 BRPM | OXYGEN SATURATION: 96 % | HEART RATE: 68 BPM | SYSTOLIC BLOOD PRESSURE: 108 MMHG | DIASTOLIC BLOOD PRESSURE: 62 MMHG | TEMPERATURE: 98.4 F | HEIGHT: 64 IN

## 2018-02-08 DIAGNOSIS — M15.9 PRIMARY OSTEOARTHRITIS INVOLVING MULTIPLE JOINTS: ICD-10-CM

## 2018-02-08 DIAGNOSIS — G89.29 CHRONIC MIDLINE LOW BACK PAIN WITHOUT SCIATICA: ICD-10-CM

## 2018-02-08 DIAGNOSIS — M19.90 ARTHRITIS: ICD-10-CM

## 2018-02-08 DIAGNOSIS — E03.9 ACQUIRED HYPOTHYROIDISM: ICD-10-CM

## 2018-02-08 DIAGNOSIS — E55.9 VITAMIN D DEFICIENCY: ICD-10-CM

## 2018-02-08 DIAGNOSIS — E78.1 HYPERTRIGLYCERIDEMIA: ICD-10-CM

## 2018-02-08 DIAGNOSIS — S32.502D CLOSED FRACTURE OF LEFT PUBIS WITH ROUTINE HEALING, UNSPECIFIED PORTION OF PUBIS, SUBSEQUENT ENCOUNTER: ICD-10-CM

## 2018-02-08 DIAGNOSIS — K21.9 GASTROESOPHAGEAL REFLUX DISEASE WITHOUT ESOPHAGITIS: ICD-10-CM

## 2018-02-08 DIAGNOSIS — M54.50 CHRONIC MIDLINE LOW BACK PAIN WITHOUT SCIATICA: ICD-10-CM

## 2018-02-08 DIAGNOSIS — I10 ESSENTIAL HYPERTENSION: ICD-10-CM

## 2018-02-08 PROCEDURE — G8417 CALC BMI ABV UP PARAM F/U: HCPCS | Performed by: FAMILY MEDICINE

## 2018-02-08 PROCEDURE — 1036F TOBACCO NON-USER: CPT | Performed by: FAMILY MEDICINE

## 2018-02-08 PROCEDURE — G8427 DOCREV CUR MEDS BY ELIG CLIN: HCPCS | Performed by: FAMILY MEDICINE

## 2018-02-08 PROCEDURE — G8484 FLU IMMUNIZE NO ADMIN: HCPCS | Performed by: FAMILY MEDICINE

## 2018-02-08 PROCEDURE — G8399 PT W/DXA RESULTS DOCUMENT: HCPCS | Performed by: FAMILY MEDICINE

## 2018-02-08 PROCEDURE — 1090F PRES/ABSN URINE INCON ASSESS: CPT | Performed by: FAMILY MEDICINE

## 2018-02-08 PROCEDURE — 1123F ACP DISCUSS/DSCN MKR DOCD: CPT | Performed by: FAMILY MEDICINE

## 2018-02-08 PROCEDURE — 99214 OFFICE O/P EST MOD 30 MIN: CPT | Performed by: FAMILY MEDICINE

## 2018-02-08 PROCEDURE — 4040F PNEUMOC VAC/ADMIN/RCVD: CPT | Performed by: FAMILY MEDICINE

## 2018-02-08 RX ORDER — OXYCODONE AND ACETAMINOPHEN 10; 325 MG/1; MG/1
1 TABLET ORAL EVERY 8 HOURS PRN
Qty: 90 TABLET | Refills: 0 | Status: SHIPPED | OUTPATIENT
Start: 2018-02-08 | End: 2018-02-27 | Stop reason: SDUPTHER

## 2018-02-08 ASSESSMENT — PATIENT HEALTH QUESTIONNAIRE - PHQ9
2. FEELING DOWN, DEPRESSED OR HOPELESS: 0
SUM OF ALL RESPONSES TO PHQ QUESTIONS 1-9: 0
1. LITTLE INTEREST OR PLEASURE IN DOING THINGS: 0
SUM OF ALL RESPONSES TO PHQ9 QUESTIONS 1 & 2: 0

## 2018-02-08 ASSESSMENT — ENCOUNTER SYMPTOMS
SHORTNESS OF BREATH: 0
CONSTIPATION: 0
ALLERGIC/IMMUNOLOGIC NEGATIVE: 1
COUGH: 0
BLOOD IN STOOL: 0
VOMITING: 0
NAUSEA: 0
DIARRHEA: 0
EYES NEGATIVE: 1
WHEEZING: 0
BACK PAIN: 1
CHEST TIGHTNESS: 0

## 2018-02-08 NOTE — PROGRESS NOTES
cholecalciferol 1000 units taken 1 daily. She also does use Os-Haim 500 and takes one of those daily as well. Review of Systems   Constitutional: Negative. HENT: Negative. Eyes: Negative. Respiratory: Negative for cough, chest tightness, shortness of breath and wheezing. Cardiovascular: Negative for chest pain, palpitations and leg swelling. Gastrointestinal: Negative for blood in stool, constipation, diarrhea, nausea and vomiting. Endocrine: Negative. Genitourinary: Negative. Negative for hematuria. Musculoskeletal: Positive for back pain. Arthritis   Skin: Negative. Allergic/Immunologic: Negative. Neurological: Negative. Hematological: Negative. Psychiatric/Behavioral: Negative. Objective:   Physical Exam   Constitutional: She is oriented to person, place, and time. Vital signs are normal. She appears well-developed. She is active. Weight remains in excess of ideal.      Due to fracture in her left hip, she does ambulate with a walker. She is doing therapy. The patient did not require operative intervention. HENT:   Head: Normocephalic and atraumatic. Right Ear: External ear normal.   Left Ear: External ear normal.   Nose: Nose normal.   Mouth/Throat: Oropharynx is clear and moist.   Eyes: Conjunctivae and EOM are normal. Pupils are equal, round, and reactive to light. Neck: Normal range of motion and full passive range of motion without pain. Neck supple. Cardiovascular: Normal rate, regular rhythm, S1 normal, S2 normal, normal heart sounds, intact distal pulses and normal pulses. Pulmonary/Chest: Effort normal and breath sounds normal.   Abdominal: Soft. Normal appearance and bowel sounds are normal.   Musculoskeletal: Normal range of motion. Neurological: She is alert and oriented to person, place, and time. She has normal strength and normal reflexes. Skin: Skin is warm, dry and intact. Psychiatric: She has a normal mood and affect.  Her

## 2018-02-27 ENCOUNTER — OFFICE VISIT (OUTPATIENT)
Dept: FAMILY MEDICINE CLINIC | Age: 83
End: 2018-02-27
Payer: MEDICARE

## 2018-02-27 VITALS
DIASTOLIC BLOOD PRESSURE: 62 MMHG | HEIGHT: 65 IN | OXYGEN SATURATION: 97 % | RESPIRATION RATE: 16 BRPM | WEIGHT: 179 LBS | TEMPERATURE: 97.8 F | BODY MASS INDEX: 29.82 KG/M2 | SYSTOLIC BLOOD PRESSURE: 118 MMHG | HEART RATE: 65 BPM

## 2018-02-27 DIAGNOSIS — K21.9 GASTROESOPHAGEAL REFLUX DISEASE WITHOUT ESOPHAGITIS: ICD-10-CM

## 2018-02-27 DIAGNOSIS — M19.90 ARTHRITIS: ICD-10-CM

## 2018-02-27 DIAGNOSIS — S32.502D CLOSED FRACTURE OF LEFT PUBIS WITH ROUTINE HEALING, UNSPECIFIED PORTION OF PUBIS, SUBSEQUENT ENCOUNTER: ICD-10-CM

## 2018-02-27 DIAGNOSIS — M15.9 PRIMARY OSTEOARTHRITIS INVOLVING MULTIPLE JOINTS: ICD-10-CM

## 2018-02-27 DIAGNOSIS — E03.9 ACQUIRED HYPOTHYROIDISM: ICD-10-CM

## 2018-02-27 DIAGNOSIS — H61.22 IMPACTED CERUMEN OF LEFT EAR: ICD-10-CM

## 2018-02-27 DIAGNOSIS — I10 ESSENTIAL HYPERTENSION: ICD-10-CM

## 2018-02-27 DIAGNOSIS — E55.9 VITAMIN D DEFICIENCY: ICD-10-CM

## 2018-02-27 DIAGNOSIS — E78.1 HYPERTRIGLYCERIDEMIA: ICD-10-CM

## 2018-02-27 PROCEDURE — G8417 CALC BMI ABV UP PARAM F/U: HCPCS | Performed by: FAMILY MEDICINE

## 2018-02-27 PROCEDURE — 69209 REMOVE IMPACTED EAR WAX UNI: CPT | Performed by: FAMILY MEDICINE

## 2018-02-27 PROCEDURE — 4040F PNEUMOC VAC/ADMIN/RCVD: CPT | Performed by: FAMILY MEDICINE

## 2018-02-27 PROCEDURE — G8427 DOCREV CUR MEDS BY ELIG CLIN: HCPCS | Performed by: FAMILY MEDICINE

## 2018-02-27 PROCEDURE — 1090F PRES/ABSN URINE INCON ASSESS: CPT | Performed by: FAMILY MEDICINE

## 2018-02-27 PROCEDURE — G8484 FLU IMMUNIZE NO ADMIN: HCPCS | Performed by: FAMILY MEDICINE

## 2018-02-27 PROCEDURE — 1036F TOBACCO NON-USER: CPT | Performed by: FAMILY MEDICINE

## 2018-02-27 PROCEDURE — G8399 PT W/DXA RESULTS DOCUMENT: HCPCS | Performed by: FAMILY MEDICINE

## 2018-02-27 PROCEDURE — 99214 OFFICE O/P EST MOD 30 MIN: CPT | Performed by: FAMILY MEDICINE

## 2018-02-27 PROCEDURE — 1123F ACP DISCUSS/DSCN MKR DOCD: CPT | Performed by: FAMILY MEDICINE

## 2018-02-27 RX ORDER — OXYCODONE AND ACETAMINOPHEN 10; 325 MG/1; MG/1
1 TABLET ORAL EVERY 8 HOURS PRN
Qty: 90 TABLET | Refills: 0 | Status: SHIPPED | OUTPATIENT
Start: 2018-02-27 | End: 2018-04-19 | Stop reason: SDUPTHER

## 2018-02-27 ASSESSMENT — PATIENT HEALTH QUESTIONNAIRE - PHQ9
1. LITTLE INTEREST OR PLEASURE IN DOING THINGS: 1
SUM OF ALL RESPONSES TO PHQ QUESTIONS 1-9: 1
SUM OF ALL RESPONSES TO PHQ9 QUESTIONS 1 & 2: 1
2. FEELING DOWN, DEPRESSED OR HOPELESS: 0

## 2018-02-27 ASSESSMENT — ENCOUNTER SYMPTOMS
CHEST TIGHTNESS: 0
WHEEZING: 0
NAUSEA: 0
SHORTNESS OF BREATH: 0
ALLERGIC/IMMUNOLOGIC NEGATIVE: 1
GASTROINTESTINAL NEGATIVE: 1
VOMITING: 0
CONSTIPATION: 0
DIARRHEA: 0
EYES NEGATIVE: 1
BLOOD IN STOOL: 0
BACK PAIN: 1
COUGH: 0

## 2018-02-27 NOTE — PROGRESS NOTES
Systems   Constitutional: Negative. HENT:        Hearing problems   Eyes: Negative. Respiratory: Negative for cough, chest tightness, shortness of breath and wheezing. Cardiovascular: Negative for chest pain, palpitations and leg swelling. Gastrointestinal: Negative. Negative for blood in stool, constipation, diarrhea, nausea and vomiting. Endocrine: Negative. Genitourinary: Negative. Negative for hematuria. Musculoskeletal: Positive for back pain. Skin: Negative. Allergic/Immunologic: Negative. Neurological: Negative. Hematological: Negative. Psychiatric/Behavioral: Negative. Objective:   Physical Exam   Constitutional: She is oriented to person, place, and time. Vital signs are normal. She appears well-developed and well-nourished. She is active. HENT:   Head: Normocephalic and atraumatic. Right Ear: External ear normal.   Left Ear: External ear normal.   Nose: Nose normal.   Mouth/Throat: Oropharynx is clear and moist.   Eyes: Conjunctivae and EOM are normal. Pupils are equal, round, and reactive to light. Neck: Normal range of motion and full passive range of motion without pain. Neck supple. Cardiovascular: Normal rate, regular rhythm, S1 normal, S2 normal, normal heart sounds, intact distal pulses and normal pulses. Pulmonary/Chest: Effort normal and breath sounds normal.   Abdominal: Soft. Normal appearance. Musculoskeletal: Normal range of motion. Neurological: She is alert and oriented to person, place, and time. She has normal strength and normal reflexes. Skin: Skin is warm, dry and intact. Psychiatric: She has a normal mood and affect. Her speech is normal and behavior is normal. Judgment and thought content normal. Cognition and memory are normal.   Nursing note and vitals reviewed. Assessment:       1. Closed fracture of left pubis with routine healing, unspecified portion of pubis, subsequent encounter     2.  Primary osteoarthritis involving multiple joints  oxyCODONE-acetaminophen (PERCOCET)  MG per tablet   3. Arthritis  oxyCODONE-acetaminophen (PERCOCET)  MG per tablet   4. Essential hypertension     5. Vitamin D deficiency     6. Acquired hypothyroidism     7. Gastroesophageal reflux disease without esophagitis     8. Hypertriglyceridemia     9. Impacted cerumen of left ear   03203 - NE REMOVE IMPACTED EAR WAX             Plan:      I am having Ms. Atiya Thacker maintain her :   Outpatient Prescriptions Marked as Taking for the 2/27/18 encounter (Office Visit) with Jakub Gao MD   Medication Sig Dispense Refill    oxyCODONE-acetaminophen (PERCOCET)  MG per tablet Take 1 tablet by mouth every 8 hours as needed for Pain for up to 30 days. Earliest Fill Date: 2/27/18 90 tablet 0   ,Patient states they are no longer utilizing these medication:   Medications Discontinued During This Encounter   Medication Reason    oxyCODONE-acetaminophen (PERCOCET)  MG per tablet Reorder    I have refilled the following medication today:   Requested Prescriptions     Signed Prescriptions Disp Refills    oxyCODONE-acetaminophen (PERCOCET)  MG per tablet 90 tablet 0     Sig: Take 1 tablet by mouth every 8 hours as needed for Pain for up to 30 days. Earliest Fill Date: 2/27/18   .

## 2018-04-03 DIAGNOSIS — K21.9 GASTROESOPHAGEAL REFLUX DISEASE, ESOPHAGITIS PRESENCE NOT SPECIFIED: ICD-10-CM

## 2018-04-03 RX ORDER — ESOMEPRAZOLE MAGNESIUM 40 MG/1
CAPSULE, DELAYED RELEASE ORAL
Qty: 90 CAPSULE | Refills: 3 | Status: SHIPPED | OUTPATIENT
Start: 2018-04-03

## 2018-04-19 ENCOUNTER — OFFICE VISIT (OUTPATIENT)
Dept: FAMILY MEDICINE CLINIC | Age: 83
End: 2018-04-19
Payer: MEDICARE

## 2018-04-19 ENCOUNTER — HOSPITAL ENCOUNTER (OUTPATIENT)
Dept: GENERAL RADIOLOGY | Age: 83
Discharge: HOME | End: 2018-04-19
Payer: MEDICARE

## 2018-04-19 VITALS
OXYGEN SATURATION: 95 % | SYSTOLIC BLOOD PRESSURE: 120 MMHG | WEIGHT: 173 LBS | HEART RATE: 65 BPM | DIASTOLIC BLOOD PRESSURE: 64 MMHG | TEMPERATURE: 98.3 F | BODY MASS INDEX: 28.79 KG/M2

## 2018-04-19 DIAGNOSIS — G89.29 CHRONIC MIDLINE LOW BACK PAIN WITH LEFT-SIDED SCIATICA: ICD-10-CM

## 2018-04-19 DIAGNOSIS — M15.9 PRIMARY OSTEOARTHRITIS INVOLVING MULTIPLE JOINTS: ICD-10-CM

## 2018-04-19 DIAGNOSIS — I10 ESSENTIAL HYPERTENSION: ICD-10-CM

## 2018-04-19 DIAGNOSIS — G89.29 CHRONIC MIDLINE LOW BACK PAIN WITHOUT SCIATICA: ICD-10-CM

## 2018-04-19 DIAGNOSIS — M25.552 PAIN OF BOTH HIP JOINTS: ICD-10-CM

## 2018-04-19 DIAGNOSIS — E78.1 HYPERTRIGLYCERIDEMIA: ICD-10-CM

## 2018-04-19 DIAGNOSIS — M54.5 BILATERAL LOW BACK PAIN, UNSPECIFIED CHRONICITY, WITH SCIATICA PRESENCE UNSPECIFIED: ICD-10-CM

## 2018-04-19 DIAGNOSIS — L98.9 SKIN LESION: ICD-10-CM

## 2018-04-19 DIAGNOSIS — M25.551 PAIN OF BOTH HIP JOINTS: ICD-10-CM

## 2018-04-19 DIAGNOSIS — E03.9 ACQUIRED HYPOTHYROIDISM: ICD-10-CM

## 2018-04-19 DIAGNOSIS — R73.9 HYPERGLYCEMIA: ICD-10-CM

## 2018-04-19 DIAGNOSIS — E55.9 VITAMIN D DEFICIENCY: ICD-10-CM

## 2018-04-19 DIAGNOSIS — K21.9 GASTROESOPHAGEAL REFLUX DISEASE WITHOUT ESOPHAGITIS: ICD-10-CM

## 2018-04-19 DIAGNOSIS — M19.90 ARTHRITIS: ICD-10-CM

## 2018-04-19 DIAGNOSIS — M54.42 CHRONIC MIDLINE LOW BACK PAIN WITH LEFT-SIDED SCIATICA: ICD-10-CM

## 2018-04-19 DIAGNOSIS — M54.50 CHRONIC MIDLINE LOW BACK PAIN WITHOUT SCIATICA: ICD-10-CM

## 2018-04-19 DIAGNOSIS — S32.502D CLOSED FRACTURE OF LEFT PUBIS WITH ROUTINE HEALING, UNSPECIFIED PORTION OF PUBIS, SUBSEQUENT ENCOUNTER: ICD-10-CM

## 2018-04-19 PROCEDURE — 72100 X-RAY EXAM L-S SPINE 2/3 VWS: CPT

## 2018-04-19 PROCEDURE — 4040F PNEUMOC VAC/ADMIN/RCVD: CPT | Performed by: FAMILY MEDICINE

## 2018-04-19 PROCEDURE — 73502 X-RAY EXAM HIP UNI 2-3 VIEWS: CPT

## 2018-04-19 PROCEDURE — G8417 CALC BMI ABV UP PARAM F/U: HCPCS | Performed by: FAMILY MEDICINE

## 2018-04-19 PROCEDURE — G8427 DOCREV CUR MEDS BY ELIG CLIN: HCPCS | Performed by: FAMILY MEDICINE

## 2018-04-19 PROCEDURE — 1123F ACP DISCUSS/DSCN MKR DOCD: CPT | Performed by: FAMILY MEDICINE

## 2018-04-19 PROCEDURE — 99214 OFFICE O/P EST MOD 30 MIN: CPT | Performed by: FAMILY MEDICINE

## 2018-04-19 PROCEDURE — 1036F TOBACCO NON-USER: CPT | Performed by: FAMILY MEDICINE

## 2018-04-19 PROCEDURE — 1090F PRES/ABSN URINE INCON ASSESS: CPT | Performed by: FAMILY MEDICINE

## 2018-04-19 PROCEDURE — G8399 PT W/DXA RESULTS DOCUMENT: HCPCS | Performed by: FAMILY MEDICINE

## 2018-04-19 RX ORDER — OXYCODONE AND ACETAMINOPHEN 10; 325 MG/1; MG/1
1 TABLET ORAL EVERY 8 HOURS PRN
Qty: 90 TABLET | Refills: 0 | Status: SHIPPED | OUTPATIENT
Start: 2018-04-19 | End: 2018-05-23 | Stop reason: SDUPTHER

## 2018-04-19 ASSESSMENT — ENCOUNTER SYMPTOMS
CHEST TIGHTNESS: 0
COUGH: 0
DIARRHEA: 0
EYES NEGATIVE: 1
WHEEZING: 0
VOICE CHANGE: 1
SHORTNESS OF BREATH: 0
BACK PAIN: 1
BLOOD IN STOOL: 0
CONSTIPATION: 0
NAUSEA: 0
VOMITING: 0

## 2018-04-20 DIAGNOSIS — E78.1 HYPERTRIGLYCERIDEMIA: ICD-10-CM

## 2018-04-20 DIAGNOSIS — E03.9 ACQUIRED HYPOTHYROIDISM: ICD-10-CM

## 2018-04-20 DIAGNOSIS — M19.90 ARTHRITIS: ICD-10-CM

## 2018-04-20 DIAGNOSIS — M15.9 PRIMARY OSTEOARTHRITIS INVOLVING MULTIPLE JOINTS: ICD-10-CM

## 2018-04-20 DIAGNOSIS — R73.9 HYPERGLYCEMIA: ICD-10-CM

## 2018-04-20 LAB
BILIRUBIN URINE: NEGATIVE
BLOOD, URINE: NEGATIVE
CLARITY: CLEAR
COLOR: YELLOW
GLUCOSE URINE: NEGATIVE MG/DL
KETONES, URINE: NEGATIVE MG/DL
LEUKOCYTE ESTERASE, URINE: NEGATIVE
NITRITE, URINE: NEGATIVE
PH UA: 7
PROTEIN UA: NEGATIVE MG/DL
SPECIFIC GRAVITY UA: 1.02
UROBILINOGEN, URINE: 0.2 E.U./DL

## 2018-04-24 LAB
ALBUMIN SERPL-MCNC: 3.9 G/DL (ref 3.5–5.2)
ALP BLD-CCNC: 90 U/L (ref 35–104)
ALT SERPL-CCNC: 10 U/L (ref 5–33)
ANION GAP SERPL CALCULATED.3IONS-SCNC: 15 MMOL/L (ref 7–19)
AST SERPL-CCNC: 17 U/L (ref 5–32)
BILIRUB SERPL-MCNC: 0.4 MG/DL (ref 0.2–1.2)
BILIRUBIN DIRECT: 0.1 MG/DL (ref 0–0.3)
BILIRUBIN, INDIRECT: 0.3 MG/DL (ref 0.1–1)
BUN BLDV-MCNC: 26 MG/DL (ref 8–23)
CALCIUM SERPL-MCNC: 9.8 MG/DL (ref 8.8–10.2)
CHLORIDE BLD-SCNC: 96 MMOL/L (ref 98–111)
CO2: 28 MMOL/L (ref 22–29)
CREAT SERPL-MCNC: 0.9 MG/DL (ref 0.5–0.9)
GFR NON-AFRICAN AMERICAN: 59
GLUCOSE BLD-MCNC: 102 MG/DL (ref 74–109)
HCT VFR BLD CALC: 35.9 % (ref 37–47)
HEMOGLOBIN: 11.6 G/DL (ref 12–16)
MCH RBC QN AUTO: 31.7 PG (ref 27–31)
MCHC RBC AUTO-ENTMCNC: 32.3 G/DL (ref 33–37)
MCV RBC AUTO: 98.1 FL (ref 81–99)
PDW BLD-RTO: 12.7 % (ref 11.5–14.5)
PLATELET # BLD: 302 K/UL (ref 130–400)
PMV BLD AUTO: 10 FL (ref 9.4–12.3)
POTASSIUM SERPL-SCNC: 3.9 MMOL/L (ref 3.5–5)
RBC # BLD: 3.66 M/UL (ref 4.2–5.4)
SODIUM BLD-SCNC: 139 MMOL/L (ref 136–145)
T4 FREE: 1.5 NG/DL (ref 0.9–1.7)
TOTAL PROTEIN: 7 G/DL (ref 6.6–8.7)
TSH SERPL DL<=0.05 MIU/L-ACNC: 0.72 UIU/ML (ref 0.27–4.2)
WBC # BLD: 6.8 K/UL (ref 4.8–10.8)

## 2018-04-27 LAB
CHOLESTEROL, TOTAL: 149 MG/DL
EER LIPOPROFILE BY NMR: ABNORMAL
HDL PARTICLE NO, NMR: >41 UMOL/L
HDL SIZE: 8.5 NM
HDLC SERPL-MCNC: 57 MG/DL (ref 40–59)
LARGE HDL PARTICLE, NMR: 3.1 UMOL/L
LARGE VLDL PARTICLE, NMR: 2.1 NMOL/L
LDL CHOLESTEROL: 56 MG/DL
LDL PARTICLE NUMBER, NMR: 973 NMOL/L
LDL PARTICLE SIZE: 20.7 NM
SMALL LDL PARTICLE, NMR: 615 NMOL/L
TRIGL SERPL-MCNC: 179 MG/DL (ref 30–149)
VLDL SIZE: 49.9 NM

## 2018-05-03 ENCOUNTER — TELEPHONE (OUTPATIENT)
Dept: FAMILY MEDICINE CLINIC | Age: 83
End: 2018-05-03

## 2018-05-21 ENCOUNTER — TELEPHONE (OUTPATIENT)
Dept: CARDIOLOGY | Facility: CLINIC | Age: 83
End: 2018-05-21

## 2018-05-23 ENCOUNTER — OFFICE VISIT (OUTPATIENT)
Dept: FAMILY MEDICINE CLINIC | Age: 83
End: 2018-05-23
Payer: MEDICARE

## 2018-05-23 VITALS
BODY MASS INDEX: 29.16 KG/M2 | HEIGHT: 65 IN | SYSTOLIC BLOOD PRESSURE: 118 MMHG | RESPIRATION RATE: 16 BRPM | TEMPERATURE: 98.3 F | WEIGHT: 175 LBS | HEART RATE: 76 BPM | DIASTOLIC BLOOD PRESSURE: 68 MMHG | OXYGEN SATURATION: 96 %

## 2018-05-23 DIAGNOSIS — E03.9 ACQUIRED HYPOTHYROIDISM: ICD-10-CM

## 2018-05-23 DIAGNOSIS — I10 ESSENTIAL HYPERTENSION: ICD-10-CM

## 2018-05-23 DIAGNOSIS — M25.552 PAIN OF BOTH HIP JOINTS: ICD-10-CM

## 2018-05-23 DIAGNOSIS — S32.502D CLOSED FRACTURE OF LEFT PUBIS WITH ROUTINE HEALING, UNSPECIFIED PORTION OF PUBIS, SUBSEQUENT ENCOUNTER: ICD-10-CM

## 2018-05-23 DIAGNOSIS — M54.50 CHRONIC MIDLINE LOW BACK PAIN WITHOUT SCIATICA: ICD-10-CM

## 2018-05-23 DIAGNOSIS — M15.9 PRIMARY OSTEOARTHRITIS INVOLVING MULTIPLE JOINTS: ICD-10-CM

## 2018-05-23 DIAGNOSIS — M19.90 ARTHRITIS: ICD-10-CM

## 2018-05-23 DIAGNOSIS — E55.9 VITAMIN D DEFICIENCY: ICD-10-CM

## 2018-05-23 DIAGNOSIS — M25.551 PAIN OF BOTH HIP JOINTS: ICD-10-CM

## 2018-05-23 DIAGNOSIS — M54.42 CHRONIC MIDLINE LOW BACK PAIN WITH LEFT-SIDED SCIATICA: ICD-10-CM

## 2018-05-23 DIAGNOSIS — G89.29 CHRONIC MIDLINE LOW BACK PAIN WITH LEFT-SIDED SCIATICA: ICD-10-CM

## 2018-05-23 DIAGNOSIS — K21.9 GASTROESOPHAGEAL REFLUX DISEASE WITHOUT ESOPHAGITIS: ICD-10-CM

## 2018-05-23 DIAGNOSIS — E78.1 HYPERTRIGLYCERIDEMIA: ICD-10-CM

## 2018-05-23 DIAGNOSIS — G89.29 CHRONIC MIDLINE LOW BACK PAIN WITHOUT SCIATICA: ICD-10-CM

## 2018-05-23 PROCEDURE — 4040F PNEUMOC VAC/ADMIN/RCVD: CPT | Performed by: FAMILY MEDICINE

## 2018-05-23 PROCEDURE — G8427 DOCREV CUR MEDS BY ELIG CLIN: HCPCS | Performed by: FAMILY MEDICINE

## 2018-05-23 PROCEDURE — 1123F ACP DISCUSS/DSCN MKR DOCD: CPT | Performed by: FAMILY MEDICINE

## 2018-05-23 PROCEDURE — 1090F PRES/ABSN URINE INCON ASSESS: CPT | Performed by: FAMILY MEDICINE

## 2018-05-23 PROCEDURE — G8399 PT W/DXA RESULTS DOCUMENT: HCPCS | Performed by: FAMILY MEDICINE

## 2018-05-23 PROCEDURE — 99214 OFFICE O/P EST MOD 30 MIN: CPT | Performed by: FAMILY MEDICINE

## 2018-05-23 PROCEDURE — G8417 CALC BMI ABV UP PARAM F/U: HCPCS | Performed by: FAMILY MEDICINE

## 2018-05-23 PROCEDURE — 1036F TOBACCO NON-USER: CPT | Performed by: FAMILY MEDICINE

## 2018-05-23 RX ORDER — OXYCODONE AND ACETAMINOPHEN 10; 325 MG/1; MG/1
1 TABLET ORAL EVERY 8 HOURS PRN
Qty: 90 TABLET | Refills: 0 | Status: SHIPPED | OUTPATIENT
Start: 2018-05-23 | End: 2018-07-03 | Stop reason: SDUPTHER

## 2018-05-23 ASSESSMENT — ENCOUNTER SYMPTOMS
COUGH: 0
WHEEZING: 0
DIARRHEA: 0
BACK PAIN: 1
CHEST TIGHTNESS: 0
CONSTIPATION: 0
BLOOD IN STOOL: 0
EYES NEGATIVE: 1
VOMITING: 0
SHORTNESS OF BREATH: 0
NAUSEA: 0
ABDOMINAL PAIN: 1

## 2018-05-23 ASSESSMENT — PATIENT HEALTH QUESTIONNAIRE - PHQ9
1. LITTLE INTEREST OR PLEASURE IN DOING THINGS: 0
SUM OF ALL RESPONSES TO PHQ QUESTIONS 1-9: 0
2. FEELING DOWN, DEPRESSED OR HOPELESS: 0
SUM OF ALL RESPONSES TO PHQ9 QUESTIONS 1 & 2: 0

## 2018-05-24 ENCOUNTER — OFFICE VISIT (OUTPATIENT)
Dept: CARDIOLOGY | Facility: CLINIC | Age: 83
End: 2018-05-24

## 2018-05-24 VITALS
BODY MASS INDEX: 29.88 KG/M2 | HEIGHT: 64 IN | SYSTOLIC BLOOD PRESSURE: 118 MMHG | WEIGHT: 175 LBS | DIASTOLIC BLOOD PRESSURE: 72 MMHG | HEART RATE: 71 BPM

## 2018-05-24 DIAGNOSIS — I50.32 CHRONIC DIASTOLIC CONGESTIVE HEART FAILURE (HCC): ICD-10-CM

## 2018-05-24 DIAGNOSIS — I25.10 CORONARY ARTERY DISEASE INVOLVING NATIVE CORONARY ARTERY OF NATIVE HEART WITHOUT ANGINA PECTORIS: Primary | ICD-10-CM

## 2018-05-24 DIAGNOSIS — E66.09 CLASS 1 OBESITY DUE TO EXCESS CALORIES WITH SERIOUS COMORBIDITY AND BODY MASS INDEX (BMI) OF 32.0 TO 32.9 IN ADULT: ICD-10-CM

## 2018-05-24 DIAGNOSIS — I10 BENIGN HYPERTENSION: ICD-10-CM

## 2018-05-24 DIAGNOSIS — E78.5 HYPERLIPIDEMIA, UNSPECIFIED HYPERLIPIDEMIA TYPE: ICD-10-CM

## 2018-05-24 PROCEDURE — 99214 OFFICE O/P EST MOD 30 MIN: CPT | Performed by: PHYSICIAN ASSISTANT

## 2018-05-24 PROCEDURE — 93000 ELECTROCARDIOGRAM COMPLETE: CPT | Performed by: PHYSICIAN ASSISTANT

## 2018-05-24 RX ORDER — ASPIRIN 81 MG/1
81 TABLET, CHEWABLE ORAL DAILY
COMMUNITY

## 2018-05-24 NOTE — PATIENT INSTRUCTIONS
Exercising to Stay Healthy  Exercising regularly is important. It has many health benefits, such as:  · Improving your overall fitness, flexibility, and endurance.  · Increasing your bone density.  · Helping with weight control.  · Decreasing your body fat.  · Increasing your muscle strength.  · Reducing stress and tension.  · Improving your overall health.  In order to become healthy and stay healthy, it is recommended that you do moderate-intensity and vigorous-intensity exercise. You can tell that you are exercising at a moderate intensity if you have a higher heart rate and faster breathing, but you are still able to hold a conversation. You can tell that you are exercising at a vigorous intensity if you are breathing much harder and faster and cannot hold a conversation while exercising.  How often should I exercise?  Choose an activity that you enjoy and set realistic goals. Your health care provider can help you to make an activity plan that works for you. Exercise regularly as directed by your health care provider. This may include:  · Doing resistance training twice each week, such as:  ¨ Push-ups.  ¨ Sit-ups.  ¨ Lifting weights.  ¨ Using resistance bands.  · Doing a given intensity of exercise for a given amount of time. Choose from these options:  ¨ 150 minutes of moderate-intensity exercise every week.  ¨ 75 minutes of vigorous-intensity exercise every week.  ¨ A mix of moderate-intensity and vigorous-intensity exercise every week.  Children, pregnant women, people who are out of shape, people who are overweight, and older adults may need to consult a health care provider for individual recommendations. If you have any sort of medical condition, be sure to consult your health care provider before starting a new exercise program.  What are some exercise ideas?  Some moderate-intensity exercise ideas include:  · Walking at a rate of 1 mile in 15 minutes.  · Biking.  · Hiking.  · Golfing.  · Dancing.  Some  vigorous-intensity exercise ideas include:  · Walking at a rate of at least 4.5 miles per hour.  · Jogging or running at a rate of 5 miles per hour.  · Biking at a rate of at least 10 miles per hour.  · Lap swimming.  · Roller-skating or in-line skating.  · Cross-country skiing.  · Vigorous competitive sports, such as football, basketball, and soccer.  · Jumping rope.  · Aerobic dancing.  What are some everyday activities that can help me to get exercise?  · Yard work, such as:  ¨ Pushing a .  ¨ Raking and bagging leaves.  · Washing and waxing your car.  · Pushing a stroller.  · Shoveling snow.  · Gardening.  · Washing windows or floors.  How can I be more active in my day-to-day activities?  · Use the stairs instead of the elevator.  · Take a walk during your lunch break.  · If you drive, park your car farther away from work or school.  · If you take public transportation, get off one stop early and walk the rest of the way.  · Make all of your phone calls while standing up and walking around.  · Get up, stretch, and walk around every 30 minutes throughout the day.  What guidelines should I follow while exercising?  · Do not exercise so much that you hurt yourself, feel dizzy, or get very short of breath.  · Consult your health care provider before starting a new exercise program.  · Wear comfortable clothes and shoes with good support.  · Drink plenty of water while you exercise to prevent dehydration or heat stroke. Body water is lost during exercise and must be replaced.  · Work out until you breathe faster and your heart beats faster.  This information is not intended to replace advice given to you by your health care provider. Make sure you discuss any questions you have with your health care provider.  Document Released: 01/20/2012 Document Revised: 05/25/2017 Document Reviewed: 05/21/2015  [employer] Interactive Patient Education © 2017 [employer]    Heart-Healthy Eating Plan  Many factors  "influence your heart health, including eating and exercise habits. Heart (coronary) risk increases with abnormal blood fat (lipid) levels. Heart-healthy meal planning includes limiting unhealthy fats, increasing healthy fats, and making other small dietary changes. This includes maintaining a healthy body weight to help keep lipid levels within a normal range.  What is my plan?  Your health care provider recommends that you:  · Get no more than _________% of the total calories in your daily diet from fat.  · Limit your intake of saturated fat to less than _________% of your total calories each day.  · Limit the amount of cholesterol in your diet to less than _________ mg per day.  What types of fat should I choose?  · Choose healthy fats more often. Choose monounsaturated and polyunsaturated fats, such as olive oil and canola oil, flaxseeds, walnuts, almonds, and seeds.  · Eat more omega-3 fats. Good choices include salmon, mackerel, sardines, tuna, flaxseed oil, and ground flaxseeds. Aim to eat fish at least two times each week.  · Limit saturated fats. Saturated fats are primarily found in animal products, such as meats, butter, and cream. Plant sources of saturated fats include palm oil, palm kernel oil, and coconut oil.  · Avoid foods with partially hydrogenated oils in them. These contain trans fats. Examples of foods that contain trans fats are stick margarine, some tub margarines, cookies, crackers, and other baked goods.  What general guidelines do I need to follow?  · Check food labels carefully to identify foods with trans fats or high amounts of saturated fat.  · Fill one half of your plate with vegetables and green salads. Eat 4-5 servings of vegetables per day. A serving of vegetables equals 1 cup of raw leafy vegetables, ½ cup of raw or cooked cut-up vegetables, or ½ cup of vegetable juice.  · Fill one fourth of your plate with whole grains. Look for the word \"whole\" as the first word in the ingredient " list.  · Fill one fourth of your plate with lean protein foods.  · Eat 4-5 servings of fruit per day. A serving of fruit equals one medium whole fruit, ¼ cup of dried fruit, ½ cup of fresh, frozen, or canned fruit, or ½ cup of 100% fruit juice.  · Eat more foods that contain soluble fiber. Examples of foods that contain this type of fiber are apples, broccoli, carrots, beans, peas, and barley. Aim to get 20-30 g of fiber per day.  · Eat more home-cooked food and less restaurant, buffet, and fast food.  · Limit or avoid alcohol.  · Limit foods that are high in starch and sugar.  · Avoid fried foods.  · Cook foods by using methods other than frying. Baking, boiling, grilling, and broiling are all great options. Other fat-reducing suggestions include:  ¨ Removing the skin from poultry.  ¨ Removing all visible fats from meats.  ¨ Skimming the fat off of stews, soups, and gravies before serving them.  ¨ Steaming vegetables in water or broth.  · Lose weight if you are overweight. Losing just 5-10% of your initial body weight can help your overall health and prevent diseases such as diabetes and heart disease.  · Increase your consumption of nuts, legumes, and seeds to 4-5 servings per week. One serving of dried beans or legumes equals ½ cup after being cooked, one serving of nuts equals 1½ ounces, and one serving of seeds equals ½ ounce or 1 tablespoon.  · You may need to monitor your salt (sodium) intake, especially if you have high blood pressure. Talk with your health care provider or dietitian to get more information about reducing sodium.  What foods can I eat?  Grains     Breads, including Sami, white, maciej, wheat, raisin, rye, oatmeal, and Italian. Tortillas that are neither fried nor made with lard or trans fat. Low-fat rolls, including hotdog and hamburger buns and English muffins. Biscuits. Muffins. Waffles. Pancakes. Light popcorn. Whole-grain cereals. Flatbread. Erie toast. Pretzels. Breadsticks. Rusks.  Low-fat snacks and crackers, including oyster, saltine, matzo, jodee, animal, and rye. Rice and pasta, including brown rice and those that are made with whole wheat.  Vegetables   All vegetables.  Fruits   All fruits, but limit coconut.  Meats and Other Protein Sources   Lean, well-trimmed beef, veal, pork, and lamb. Chicken and turkey without skin. All fish and shellfish. Wild duck, rabbit, pheasant, and venison. Egg whites or low-cholesterol egg substitutes. Dried beans, peas, lentils, and tofu. Seeds and most nuts.  Dairy   Low-fat or nonfat cheeses, including ricotta, string, and mozzarella. Skim or 1% milk that is liquid, powdered, or evaporated. Buttermilk that is made with low-fat milk. Nonfat or low-fat yogurt.  Beverages   Mineral water. Diet carbonated beverages.  Sweets and Desserts   Sherbets and fruit ices. Honey, jam, marmalade, jelly, and syrups. Meringues and gelatins. Pure sugar candy, such as hard candy, jelly beans, gumdrops, mints, marshmallows, and small amounts of dark chocolate. [place] food cake.  Eat all sweets and desserts in moderation.  Fats and Oils   Nonhydrogenated (trans-free) margarines. Vegetable oils, including soybean, sesame, sunflower, olive, peanut, safflower, corn, canola, and cottonseed. Salad dressings or mayonnaise that are made with a vegetable oil. Limit added fats and oils that you use for cooking, baking, salads, and as spreads.  Other   Cocoa powder. Coffee and tea. All seasonings and condiments.  The items listed above may not be a complete list of recommended foods or beverages. Contact your dietitian for more options.   What foods are not recommended?  Grains   Breads that are made with saturated or trans fats, oils, or whole milk. Croissants. Butter rolls. Cheese breads. Sweet rolls. Donuts. Buttered popcorn. Chow mein noodles. High-fat crackers, such as cheese or butter crackers.  Meats and Other Protein Sources   Fatty meats, such as hotdogs, short ribs, sausage,  spareribs, hodges, ribeye roast or steak, and mutton. High-fat deli meats, such as salami and bologna. Caviar. Domestic duck and goose. Organ meats, such as kidney, liver, sweetbreads, brains, gizzard, chitterlings, and heart.  Dairy   Cream, sour cream, cream cheese, and creamed cottage cheese. Whole milk cheeses, including blue (liang), [place], Brie, Bebeto, American, Havarti, Swiss, cheddar, Camembert, and [place]. Whole or 2% milk that is liquid, evaporated, or condensed. Whole buttermilk. Cream sauce or high-fat cheese sauce. Yogurt that is made from whole milk.  Beverages   Regular sodas and drinks with added sugar.  Sweets and Desserts   Frosting. Pudding. Cookies. Cakes other than wendy food cake. Candy that has milk chocolate or white chocolate, hydrogenated fat, butter, coconut, or unknown ingredients. Buttered syrups. Full-fat ice cream or ice cream drinks.  Fats and Oils   Gravy that has suet, meat fat, or shortening. Cocoa butter, hydrogenated oils, palm oil, coconut oil, palm kernel oil. These can often be found in baked products, candy, fried foods, nondairy creamers, and whipped toppings. Solid fats and shortenings, including hodges fat, salt pork, lard, and butter. Nondairy cream substitutes, such as coffee creamers and sour cream substitutes. Salad dressings that are made of unknown oils, cheese, or sour cream.  The items listed above may not be a complete list of foods and beverages to avoid. Contact your dietitian for more information.   This information is not intended to replace advice given to you by your health care provider. Make sure you discuss any questions you have with your health care provider.  Document Released: 09/26/2009 Document Revised: 07/07/2017 Document Reviewed: 06/11/2015  [employer] Interactive Patient Education © 2017 [employer]

## 2018-05-24 NOTE — PROGRESS NOTES
"    Subjective:     Encounter Date:05/24/2018      Patient ID: Columba Venegas is a 85 y.o. female w hx of CAD, HTN, HLD, HFpEF who [facility]. She reports she had a fall around Thanksgiving and this has caused her limitations with her ability to walk and \"get around\". She uses a walker full time now. She has done physical therapy and is progressing in her mobility. Otherwise, she is without complaint today. She denies any cardiac issues or symptoms such as dyspnea, chest pain or similar.     Chief Complaint:  Coronary Artery Disease   Presents for follow-up visit. Symptoms include leg swelling (chronic, unchanged, mild). Pertinent negatives include no chest pain, dizziness, palpitations, shortness of breath or weight gain. Risk factors include obesity. The symptoms have been stable. Compliance with diet is variable. Compliance with exercise is poor. Compliance with medications is good.   Hypertension   This is a chronic problem. The current episode started more than 1 year ago. The problem is unchanged. The problem is controlled. Pertinent negatives include no chest pain, malaise/fatigue, orthopnea, palpitations, PND or shortness of breath. There are no associated agents to hypertension. Risk factors for coronary artery disease include dyslipidemia, obesity and post-menopausal state. Past treatments include calcium channel blockers, diuretics and angiotensin blockers. Current antihypertension treatment includes angiotensin blockers, calcium channel blockers and diuretics. The current treatment provides significant improvement. Compliance problems include exercise and diet.  Hypertensive end-organ damage includes CAD/MI. There is no history of chronic renal disease.   Obesity   This is a chronic problem. The current episode started more than 1 year ago. The problem occurs constantly. The problem has been unchanged. Pertinent negatives include no chest pain, myalgias, nausea " or vomiting. The symptoms are aggravated by eating. She has tried nothing for the symptoms.       The following portions of the patient's history were reviewed and updated as appropriate: allergies, current medications, past family history, past medical history, past social history, past surgical history and problem list.    Review of Systems   Constitution: Negative for malaise/fatigue and weight gain.   Cardiovascular: Positive for leg swelling (chronic, unchanged, mild). Negative for chest pain, claudication, dyspnea on exertion, irregular heartbeat, near-syncope, orthopnea, palpitations, paroxysmal nocturnal dyspnea and syncope.   Respiratory: Negative for hemoptysis and shortness of breath.    Hematologic/Lymphatic: Negative for bleeding problem.   Skin: Negative for poor wound healing.   Musculoskeletal: Positive for falls. Negative for myalgias.   Gastrointestinal: Negative for melena, nausea and vomiting.   Genitourinary: Negative for hematuria.   Neurological: Negative for dizziness, focal weakness and light-headedness.   Psychiatric/Behavioral: Negative for memory loss.   All other systems reviewed and are negative.        ECG 12 Lead  Date/Time: 5/24/2018 1:52 PM  Performed by: TARIQ FLORES  Authorized by: TARIQ FLORES   Comparison: compared with previous ECG from 11/24/2017  Similar to previous ECG  Rhythm: sinus rhythm  Rate: normal  QRS axis: left  Other findings: PRWP  Clinical impression: abnormal ECG               Objective:     Physical Exam   Constitutional: She is oriented to person, place, and time. She appears well-developed and well-nourished.   HENT:   Head: Normocephalic and atraumatic.   Eyes: Conjunctivae and EOM are normal. Pupils are equal, round, and reactive to light.   Neck: Normal range of motion. Neck supple. No JVD present.   Cardiovascular: Normal rate, regular rhythm, S1 normal, S2 normal, normal heart sounds, intact distal pulses and normal pulses.    No murmur  "heard.  Pulmonary/Chest: Effort normal and breath sounds normal. No respiratory distress.   Abdominal: Soft. Bowel sounds are normal. She exhibits no distension.   Musculoskeletal: She exhibits no edema or tenderness.   Neurological: She is alert and oriented to person, place, and time.   Skin: Skin is warm and dry.   Psychiatric: She has a normal mood and affect. Judgment normal.   Vitals reviewed.      /72   Pulse 71   Ht 162.6 cm (64\")   Wt 79.4 kg (175 lb)   BMI 30.04 kg/m²     Current Outpatient Prescriptions:   •  aspirin 81 MG chewable tablet, Chew 81 mg Daily., Disp: , Rfl:   •  cetirizine (zyrTEC) 5 MG tablet, Take 5 mg by mouth Daily., Disp: , Rfl:   •  diltiaZEM SR (CARDIZEM SR) 90 MG 12 hr capsule, Take 90 mg by mouth 2 (Two) Times a Day., Disp: , Rfl:   •  docusate sodium 100 MG capsule, Take 100 mg by mouth 2 (Two) Times a Day. (Patient taking differently: Take 100 mg by mouth As Needed.), Disp: , Rfl:   •  esomeprazole (nexIUM) 40 MG capsule, Take 40 mg by mouth Every Morning Before Breakfast., Disp: , Rfl:   •  furosemide (LASIX) 20 MG tablet, Take 20 mg by mouth As Needed., Disp: , Rfl:   •  levothyroxine (SYNTHROID, LEVOTHROID) 50 MCG tablet, Take 50 mcg by mouth Daily., Disp: , Rfl:   •  Multiple Vitamins-Minerals (PRESERVISION AREDS 2 PO), Take  by mouth., Disp: , Rfl:   •  Omega-3 Fatty Acids (FISH OIL) 1000 MG capsule capsule, Take 1,000 mg by mouth Daily With Breakfast., Disp: , Rfl:   •  oxyCODONE-acetaminophen (PERCOCET) 5-325 MG per tablet, Take 1 tablet by mouth Every 6 (Six) Hours As Needed for Severe Pain ., Disp: 20 tablet, Rfl: 0  •  polyethylene glycol (MIRALAX) pack packet, Take 17 g by mouth Daily., Disp: 7 each, Rfl:   •  simvastatin (ZOCOR) 20 MG tablet, Take 20 mg by mouth Every Night., Disp: , Rfl:   •  valsartan (DIOVAN) 160 MG tablet, Take 1 tablet by mouth Daily., Disp: , Rfl:   Past Medical History:   Diagnosis Date   • CAD in native artery    • Central sleep apnea "    • Chest pain     Other   • Fatigue    • Hip fracture     left   • Hyperlipidemia    • Hypertension    • Hypothyroidism, adult    • Obstructive sleep apnea, adult    • Peripheral neuropathy    • Peripheral neuropathy    • SOB (shortness of breath)      Past Surgical History:   Procedure Laterality Date   • APPENDECTOMY     • BACK SURGERY  05/05/2011    Low Back surgery   • BRAIN SURGERY  05/05/2011   • BRAIN SURGERY     • CARDIAC CATHETERIZATION     • CHOLECYSTECTOMY     • CORONARY STENT PLACEMENT     • HYSTERECTOMY  05/05/2011    Total   • JOINT REPLACEMENT Right      Allergies   Allergen Reactions   • Naproxen      Social History     Social History   • Marital status:      Spouse name: N/A   • Number of children: N/A   • Years of education: N/A     Occupational History   • Not on file.     Social History Main Topics   • Smoking status: Former Smoker     Quit date: 1973   • Smokeless tobacco: Never Used      Comment: Quit 1973   • Alcohol use No   • Drug use: No   • Sexual activity: Defer     Other Topics Concern   • Not on file     Social History Narrative   • No narrative on file     Family History   Problem Relation Age of Onset   • Coronary artery disease Father    • Hypertension Father    • Stroke Mother    • Stroke Brother    • Lung disease Brother            Assessment:          Diagnosis Plan   1. Coronary artery disease involving native coronary artery of native heart without angina pectoris     2. Benign hypertension      Well controlled   3. Chronic diastolic (congestive) heart failure      Compensated     4. Hyperlipidemia, unspecified hyperlipidemia type      On statin- followed by PCP  Reportedly well controlled   5. Class 1 obesity due to excess calories with serious comorbidity and body mass index (BMI) of 32.0 to 32.9 in adult            Plan:     1. Continue present therapy  2. Patient's Body mass index is 30.04 kg/m². BMI is above normal parameters. Recommendations include: educational  material.  3. Follow-up in 1 year, unless needed sooner  4. Verbalized understanding of instructions.

## 2018-06-16 DIAGNOSIS — I10 ESSENTIAL HYPERTENSION: ICD-10-CM

## 2018-06-18 RX ORDER — DILTIAZEM HCL 90 MG
TABLET ORAL
Qty: 180 TABLET | Refills: 3 | Status: SHIPPED | OUTPATIENT
Start: 2018-06-18 | End: 2019-06-07 | Stop reason: SDUPTHER

## 2018-07-03 ENCOUNTER — OFFICE VISIT (OUTPATIENT)
Dept: FAMILY MEDICINE CLINIC | Age: 83
End: 2018-07-03
Payer: MEDICARE

## 2018-07-03 VITALS
DIASTOLIC BLOOD PRESSURE: 60 MMHG | WEIGHT: 172 LBS | SYSTOLIC BLOOD PRESSURE: 118 MMHG | TEMPERATURE: 98.6 F | HEART RATE: 78 BPM | HEIGHT: 64 IN | OXYGEN SATURATION: 98 % | BODY MASS INDEX: 29.37 KG/M2

## 2018-07-03 DIAGNOSIS — K21.9 GASTROESOPHAGEAL REFLUX DISEASE WITHOUT ESOPHAGITIS: ICD-10-CM

## 2018-07-03 DIAGNOSIS — M25.551 PAIN OF BOTH HIP JOINTS: ICD-10-CM

## 2018-07-03 DIAGNOSIS — I10 ESSENTIAL HYPERTENSION: ICD-10-CM

## 2018-07-03 DIAGNOSIS — E55.9 VITAMIN D DEFICIENCY: ICD-10-CM

## 2018-07-03 DIAGNOSIS — M25.552 PAIN OF BOTH HIP JOINTS: ICD-10-CM

## 2018-07-03 DIAGNOSIS — G89.29 CHRONIC MIDLINE LOW BACK PAIN WITHOUT SCIATICA: ICD-10-CM

## 2018-07-03 DIAGNOSIS — E03.9 ACQUIRED HYPOTHYROIDISM: ICD-10-CM

## 2018-07-03 DIAGNOSIS — M19.90 ARTHRITIS: ICD-10-CM

## 2018-07-03 DIAGNOSIS — G89.29 CHRONIC MIDLINE LOW BACK PAIN WITH LEFT-SIDED SCIATICA: ICD-10-CM

## 2018-07-03 DIAGNOSIS — M54.50 CHRONIC MIDLINE LOW BACK PAIN WITHOUT SCIATICA: ICD-10-CM

## 2018-07-03 DIAGNOSIS — M54.42 CHRONIC MIDLINE LOW BACK PAIN WITH LEFT-SIDED SCIATICA: ICD-10-CM

## 2018-07-03 DIAGNOSIS — M15.9 PRIMARY OSTEOARTHRITIS INVOLVING MULTIPLE JOINTS: ICD-10-CM

## 2018-07-03 DIAGNOSIS — E78.1 HYPERTRIGLYCERIDEMIA: ICD-10-CM

## 2018-07-03 DIAGNOSIS — S32.502D CLOSED FRACTURE OF LEFT PUBIS WITH ROUTINE HEALING, UNSPECIFIED PORTION OF PUBIS, SUBSEQUENT ENCOUNTER: ICD-10-CM

## 2018-07-03 PROCEDURE — G8417 CALC BMI ABV UP PARAM F/U: HCPCS | Performed by: FAMILY MEDICINE

## 2018-07-03 PROCEDURE — 1123F ACP DISCUSS/DSCN MKR DOCD: CPT | Performed by: FAMILY MEDICINE

## 2018-07-03 PROCEDURE — 1036F TOBACCO NON-USER: CPT | Performed by: FAMILY MEDICINE

## 2018-07-03 PROCEDURE — G8399 PT W/DXA RESULTS DOCUMENT: HCPCS | Performed by: FAMILY MEDICINE

## 2018-07-03 PROCEDURE — 4040F PNEUMOC VAC/ADMIN/RCVD: CPT | Performed by: FAMILY MEDICINE

## 2018-07-03 PROCEDURE — G8427 DOCREV CUR MEDS BY ELIG CLIN: HCPCS | Performed by: FAMILY MEDICINE

## 2018-07-03 PROCEDURE — 1090F PRES/ABSN URINE INCON ASSESS: CPT | Performed by: FAMILY MEDICINE

## 2018-07-03 PROCEDURE — 99214 OFFICE O/P EST MOD 30 MIN: CPT | Performed by: FAMILY MEDICINE

## 2018-07-03 RX ORDER — SUCRALFATE 1 G/1
1 TABLET ORAL DAILY
Qty: 30 TABLET | Refills: 5 | Status: SHIPPED | OUTPATIENT
Start: 2018-07-03 | End: 2018-08-09 | Stop reason: SDUPTHER

## 2018-07-03 RX ORDER — OXYCODONE AND ACETAMINOPHEN 10; 325 MG/1; MG/1
1 TABLET ORAL EVERY 8 HOURS PRN
Qty: 90 TABLET | Refills: 0 | Status: SHIPPED | OUTPATIENT
Start: 2018-07-03 | End: 2018-08-09 | Stop reason: SDUPTHER

## 2018-07-03 ASSESSMENT — ENCOUNTER SYMPTOMS
NAUSEA: 0
CHEST TIGHTNESS: 0
CONSTIPATION: 0
SHORTNESS OF BREATH: 0
COUGH: 0
BACK PAIN: 1
VOMITING: 0
DIARRHEA: 0
BLOOD IN STOOL: 0
WHEEZING: 0

## 2018-07-03 NOTE — PROGRESS NOTES
[facility] [address] [address]  Dept: [phone]  Dept Fax: [phone]  Loc: [phone]    Noemi Mckinney is a 80 y.o. female who presents today for her medical conditions/complaints as noted below. Noemi Mckinney is c/o of Arthritis (medication)        HPI:     HPI  This patient is seen here regularly by the undersigned for management of chronic disease states. She does suffer a good bit with chronic pain. She has primary osteoarthritis affecting multiple joints from time to time. Also she does have a history of a fall over a year ago in which she sustained fracture of the left pubic ramus and has had some chronic pain secondary to that. She is ambulatory however at this time. She also suffers with chronic midline low back pain with left-sided sciatica as well. Currently the patient is maintained on Percocet 10/325 which is utilized it every 8 hour intervals. She did mention to us today that the she has a friend who had been on Pennsaid applied topically to areas that were quite painful and would like to try this. We therefore are prescribing Pennsaid for her to apply topically up to 4 times daily and see if this helps. She is maintained on currently on Moban 15 mg by mouth daily with food. These medications are helpful but the patient still suffers a good bit. She does have a history of GERD. She is on Nexium 40 mg by mouth daily and Carafate 1 g which previously has been taken with her anti-inflammatory drug to minimize impact on stomach. The patient does have essential hypertension. She remains on Diovan/HCTZ at 160/25 taken at 1 by mouth daily and Cardizem 90 taken 1 by mouth twice a day. . Furosemide 40 is taken at 1 by mouth daily. Meds are tolerated well and her pressure today is good at 118/60. She does have a history of glaucoma. She currently does utilize Xalatan 0.005% apply 1 drop to the affected eye daily.     She Negative for hematuria. Musculoskeletal: Positive for arthralgias, back pain and gait problem. Objective:     Physical Exam   Constitutional: She is oriented to person, place, and time. Vital signs are normal. She appears well-developed. She is active. Weight remains in excess of ideal.   HENT:   Head: Normocephalic and atraumatic. Right Ear: External ear normal.   Left Ear: External ear normal.   Nose: Nose normal.   Mouth/Throat: Oropharynx is clear and moist.   Eyes: Conjunctivae and EOM are normal. Pupils are equal, round, and reactive to light. Neck: Normal range of motion and full passive range of motion without pain. Neck supple. Cardiovascular: Normal rate, regular rhythm, S1 normal, S2 normal, normal heart sounds, intact distal pulses and normal pulses. Pulmonary/Chest: Effort normal and breath sounds normal.   Abdominal: Soft. Normal appearance. Musculoskeletal: Normal range of motion. Neurological: She is alert and oriented to person, place, and time. She has normal strength and normal reflexes. Skin: Skin is warm, dry and intact. Psychiatric: She has a normal mood and affect. Her speech is normal and behavior is normal. Judgment and thought content normal. Cognition and memory are normal.   Nursing note and vitals reviewed. Vitals:    07/03/18 1329   BP: 118/60   Pulse: 78   Temp: 98.6 °F (37 °C)   SpO2: 98%         Assessment:       Diagnosis Orders   1. Primary osteoarthritis involving multiple joints  oxyCODONE-acetaminophen (PERCOCET)  MG per tablet    diclofenac 2 % SOLN   2. Arthritis  oxyCODONE-acetaminophen (PERCOCET)  MG per tablet   3. Closed fracture of left pubis with routine healing, unspecified portion of pubis, subsequent encounter  oxyCODONE-acetaminophen (PERCOCET)  MG per tablet   4. Chronic midline low back pain with left-sided sciatica  oxyCODONE-acetaminophen (PERCOCET)  MG per tablet   5.  Pain of both hip joints

## 2018-08-09 ENCOUNTER — OFFICE VISIT (OUTPATIENT)
Dept: FAMILY MEDICINE CLINIC | Age: 83
End: 2018-08-09
Payer: MEDICARE

## 2018-08-09 VITALS
BODY MASS INDEX: 29.35 KG/M2 | OXYGEN SATURATION: 93 % | HEART RATE: 82 BPM | SYSTOLIC BLOOD PRESSURE: 126 MMHG | DIASTOLIC BLOOD PRESSURE: 70 MMHG | TEMPERATURE: 97.7 F | WEIGHT: 171 LBS

## 2018-08-09 DIAGNOSIS — M15.9 PRIMARY OSTEOARTHRITIS INVOLVING MULTIPLE JOINTS: ICD-10-CM

## 2018-08-09 DIAGNOSIS — E03.9 ACQUIRED HYPOTHYROIDISM: ICD-10-CM

## 2018-08-09 DIAGNOSIS — M25.551 PAIN OF BOTH HIP JOINTS: ICD-10-CM

## 2018-08-09 DIAGNOSIS — I10 ESSENTIAL HYPERTENSION: ICD-10-CM

## 2018-08-09 DIAGNOSIS — E78.1 HYPERTRIGLYCERIDEMIA: ICD-10-CM

## 2018-08-09 DIAGNOSIS — S32.502D CLOSED FRACTURE OF LEFT PUBIS WITH ROUTINE HEALING, UNSPECIFIED PORTION OF PUBIS, SUBSEQUENT ENCOUNTER: ICD-10-CM

## 2018-08-09 DIAGNOSIS — M54.42 CHRONIC MIDLINE LOW BACK PAIN WITH LEFT-SIDED SCIATICA: ICD-10-CM

## 2018-08-09 DIAGNOSIS — K21.9 GASTROESOPHAGEAL REFLUX DISEASE WITHOUT ESOPHAGITIS: ICD-10-CM

## 2018-08-09 DIAGNOSIS — M25.552 PAIN OF BOTH HIP JOINTS: ICD-10-CM

## 2018-08-09 DIAGNOSIS — M19.90 ARTHRITIS: ICD-10-CM

## 2018-08-09 DIAGNOSIS — G89.29 CHRONIC MIDLINE LOW BACK PAIN WITH LEFT-SIDED SCIATICA: ICD-10-CM

## 2018-08-09 PROCEDURE — G8427 DOCREV CUR MEDS BY ELIG CLIN: HCPCS | Performed by: FAMILY MEDICINE

## 2018-08-09 PROCEDURE — 4040F PNEUMOC VAC/ADMIN/RCVD: CPT | Performed by: FAMILY MEDICINE

## 2018-08-09 PROCEDURE — G8399 PT W/DXA RESULTS DOCUMENT: HCPCS | Performed by: FAMILY MEDICINE

## 2018-08-09 PROCEDURE — 1036F TOBACCO NON-USER: CPT | Performed by: FAMILY MEDICINE

## 2018-08-09 PROCEDURE — 1090F PRES/ABSN URINE INCON ASSESS: CPT | Performed by: FAMILY MEDICINE

## 2018-08-09 PROCEDURE — G8417 CALC BMI ABV UP PARAM F/U: HCPCS | Performed by: FAMILY MEDICINE

## 2018-08-09 PROCEDURE — 1123F ACP DISCUSS/DSCN MKR DOCD: CPT | Performed by: FAMILY MEDICINE

## 2018-08-09 PROCEDURE — 1101F PT FALLS ASSESS-DOCD LE1/YR: CPT | Performed by: FAMILY MEDICINE

## 2018-08-09 PROCEDURE — 99214 OFFICE O/P EST MOD 30 MIN: CPT | Performed by: FAMILY MEDICINE

## 2018-08-09 RX ORDER — SUCRALFATE 1 G/1
1 TABLET ORAL 4 TIMES DAILY
Qty: 120 TABLET | Refills: 5 | Status: SHIPPED | OUTPATIENT
Start: 2018-08-09 | End: 2019-08-22 | Stop reason: SDUPTHER

## 2018-08-09 RX ORDER — OXYCODONE AND ACETAMINOPHEN 10; 325 MG/1; MG/1
1 TABLET ORAL EVERY 8 HOURS PRN
Qty: 90 TABLET | Refills: 0 | Status: SHIPPED | OUTPATIENT
Start: 2018-08-09 | End: 2018-09-06 | Stop reason: SDUPTHER

## 2018-08-09 ASSESSMENT — ENCOUNTER SYMPTOMS
NAUSEA: 0
BACK PAIN: 1
CONSTIPATION: 0
DIARRHEA: 0
CHEST TIGHTNESS: 0
VOMITING: 0
BLOOD IN STOOL: 0
COUGH: 0
EYES NEGATIVE: 1
SHORTNESS OF BREATH: 0
WHEEZING: 0

## 2018-08-09 NOTE — PROGRESS NOTES
meloxicam (MOBIC) 15 MG tablet Take 1 tablet by mouth daily 30 tablet 5    simvastatin (ZOCOR) 20 MG tablet TAKE 1 TABLET BY MOUTH AT BEDTIME (GENERIC FOR ZOCOR) 90 tablet 3    valsartan-hydrochlorothiazide (DIOVAN-HCT) 160-25 MG per tablet TAKE 1 TABLET BY MOUTH ONCE DAILY (GENERIC FOR DIOVAN HCT) 90 tablet 3    levothyroxine (SYNTHROID) 50 MCG tablet TAKE ONE TABLET BY MOUTH ONCE DAILY 90 tablet 3    aspirin 81 MG tablet Take 81 mg by mouth every other day One every other day 30 tablet     furosemide (LASIX) 40 MG tablet Take one pill a day 90 tablet 3    Multiple Vitamins-Minerals (PRESERVISION AREDS 2) CAPS Take  by mouth.  latanoprost (XALATAN) 0.005 % ophthalmic solution 1 drop daily.  Multiple Vitamins-Minerals (THERAPEUTIC MULTIVITAMIN-MINERALS) tablet Take 1 tablet by mouth daily.  calcium carbonate (OSCAL) 500 MG TABS tablet Take 500 mg by mouth daily.  vitamin D3 (CHOLECALCIFEROL) 1000 UNITS TABS tablet Take 1,000 Units by mouth daily. No current facility-administered medications for this visit. Allergies   Allergen Reactions    Mobic [Meloxicam] Nausea Only     Medication was very fairly effective but did cause some GI distress.  Naprosyn [Naproxen]      N/v      Bactrim [Sulfamethoxazole-Trimethoprim] Nausea And Vomiting       Health Maintenance   Topic Date Due    DTaP/Tdap/Td vaccine (1 - Tdap) 02/06/1952    Shingles Vaccine (1 of 2 - 2 Dose Series) 02/06/1983    Flu vaccine (1) 09/01/2018    TSH testing  04/20/2019    Potassium monitoring  04/20/2019    Creatinine monitoring  04/20/2019    DEXA (modify frequency per FRAX score)  Completed    Pneumococcal low/med risk  Completed       Subjective:      Review of Systems   Constitutional: Negative. HENT: Negative. Eyes: Negative. Respiratory: Negative for cough, chest tightness, shortness of breath and wheezing. Cardiovascular: Negative for chest pain, palpitations and leg swelling. Gastrointestinal: Negative for blood in stool, constipation, diarrhea, nausea and vomiting. Genitourinary: Negative for hematuria. Musculoskeletal: Positive for arthralgias and back pain. The patient suffers with arthritic pains involving especially her knees, hips, and pelvis were she has had prior fracture. She continues to use a Rollator walker to help facilitate mobility. Skin: Negative. Neurological: Negative. Psychiatric/Behavioral: Negative. Objective:     Physical Exam   Constitutional: She is oriented to person, place, and time. Vital signs are normal. She appears well-developed. She is active. Weight remains in excess of ideal at 171 with BMI thereby calculated to be 29.35. HENT:   Head: Normocephalic and atraumatic. Right Ear: External ear normal.   Left Ear: External ear normal.   Nose: Nose normal.   Mouth/Throat: Oropharynx is clear and moist.   Eyes: Conjunctivae and EOM are normal. Pupils are equal, round, and reactive to light. Neck: Normal range of motion and full passive range of motion without pain. Neck supple. Cardiovascular: Normal rate, regular rhythm, S1 normal, S2 normal, normal heart sounds and normal pulses. Pulmonary/Chest: Effort normal and breath sounds normal.   Abdominal: Soft. Normal appearance. Musculoskeletal: Normal range of motion. Neurological: She is alert and oriented to person, place, and time. She has normal strength and normal reflexes. Skin: Skin is warm, dry and intact. Psychiatric: She has a normal mood and affect. Her speech is normal and behavior is normal. Judgment and thought content normal. Cognition and memory are normal.   Nursing note and vitals reviewed. /70 (Site: Left Arm, Position: Sitting, Cuff Size: Medium Adult)   Pulse 82   Temp 97.7 °F (36.5 °C) (Temporal)   Wt 171 lb (77.6 kg)   SpO2 93%   BMI 29.35 kg/m²     Assessment:       Diagnosis Orders   1.  Primary osteoarthritis involving multiple joints  oxyCODONE-acetaminophen (PERCOCET)  MG per tablet   2. Arthritis  oxyCODONE-acetaminophen (PERCOCET)  MG per tablet   3. Closed fracture of left pubis with routine healing, unspecified portion of pubis, subsequent encounter  oxyCODONE-acetaminophen (PERCOCET)  MG per tablet   4. Chronic midline low back pain with left-sided sciatica  oxyCODONE-acetaminophen (PERCOCET)  MG per tablet   5. Pain of both hip joints  oxyCODONE-acetaminophen (PERCOCET)  MG per tablet   6. Gastroesophageal reflux disease without esophagitis  sucralfate (CARAFATE) 1 GM tablet   7. Hypertriglyceridemia     8. Essential hypertension     9. Acquired hypothyroidism         Plan:    No orders of the defined types were placed in this encounter. No Follow-up on file. Orders Placed This Encounter   Medications    oxyCODONE-acetaminophen (PERCOCET)  MG per tablet     Sig: Take 1 tablet by mouth every 8 hours as needed for Pain for up to 30 days. Dillon Dye Date: 8/9/18     Dispense:  90 tablet     Refill:  0    sucralfate (CARAFATE) 1 GM tablet     Sig: Take 1 tablet by mouth 4 times daily Take with the meloxicam.     Dispense:  120 tablet     Refill:  5       Patient offered educational materials - see patient instructions. Discussed use, benefit, and side effects of prescribed medications. All patient questions answered. Pt voiced understanding. Reviewed health maintenance. Instructed to continue current medications, diet and exercise. Patient agreed with treatment plan. Follow up as directed. This dictation was generated by voice recognition computer software. Although all attempts are made to edit the dictation for accuracy, there may be errors in the transcription that are not intended.       Electronically signed by Josias Saldaña MD on 8/9/2018 at 2:49 PM

## 2018-09-06 ENCOUNTER — OFFICE VISIT (OUTPATIENT)
Dept: FAMILY MEDICINE CLINIC | Age: 83
End: 2018-09-06
Payer: MEDICARE

## 2018-09-06 VITALS
HEART RATE: 65 BPM | TEMPERATURE: 98.1 F | SYSTOLIC BLOOD PRESSURE: 120 MMHG | DIASTOLIC BLOOD PRESSURE: 76 MMHG | WEIGHT: 175 LBS | BODY MASS INDEX: 30.04 KG/M2 | OXYGEN SATURATION: 95 %

## 2018-09-06 DIAGNOSIS — M54.42 CHRONIC MIDLINE LOW BACK PAIN WITH LEFT-SIDED SCIATICA: ICD-10-CM

## 2018-09-06 DIAGNOSIS — K21.9 GASTROESOPHAGEAL REFLUX DISEASE WITHOUT ESOPHAGITIS: ICD-10-CM

## 2018-09-06 DIAGNOSIS — E03.9 ACQUIRED HYPOTHYROIDISM: ICD-10-CM

## 2018-09-06 DIAGNOSIS — I10 ESSENTIAL HYPERTENSION: ICD-10-CM

## 2018-09-06 DIAGNOSIS — M19.90 ARTHRITIS: ICD-10-CM

## 2018-09-06 DIAGNOSIS — E78.1 HYPERTRIGLYCERIDEMIA: ICD-10-CM

## 2018-09-06 DIAGNOSIS — E55.9 VITAMIN D DEFICIENCY: ICD-10-CM

## 2018-09-06 DIAGNOSIS — S32.502D CLOSED FRACTURE OF LEFT PUBIS WITH ROUTINE HEALING, UNSPECIFIED PORTION OF PUBIS, SUBSEQUENT ENCOUNTER: ICD-10-CM

## 2018-09-06 DIAGNOSIS — M15.9 PRIMARY OSTEOARTHRITIS INVOLVING MULTIPLE JOINTS: ICD-10-CM

## 2018-09-06 DIAGNOSIS — G89.29 CHRONIC MIDLINE LOW BACK PAIN WITH LEFT-SIDED SCIATICA: ICD-10-CM

## 2018-09-06 DIAGNOSIS — M25.552 PAIN OF BOTH HIP JOINTS: ICD-10-CM

## 2018-09-06 DIAGNOSIS — M25.551 PAIN OF BOTH HIP JOINTS: ICD-10-CM

## 2018-09-06 PROCEDURE — 4040F PNEUMOC VAC/ADMIN/RCVD: CPT | Performed by: FAMILY MEDICINE

## 2018-09-06 PROCEDURE — 1123F ACP DISCUSS/DSCN MKR DOCD: CPT | Performed by: FAMILY MEDICINE

## 2018-09-06 PROCEDURE — 1036F TOBACCO NON-USER: CPT | Performed by: FAMILY MEDICINE

## 2018-09-06 PROCEDURE — G8427 DOCREV CUR MEDS BY ELIG CLIN: HCPCS | Performed by: FAMILY MEDICINE

## 2018-09-06 PROCEDURE — G8417 CALC BMI ABV UP PARAM F/U: HCPCS | Performed by: FAMILY MEDICINE

## 2018-09-06 PROCEDURE — 1090F PRES/ABSN URINE INCON ASSESS: CPT | Performed by: FAMILY MEDICINE

## 2018-09-06 PROCEDURE — G8399 PT W/DXA RESULTS DOCUMENT: HCPCS | Performed by: FAMILY MEDICINE

## 2018-09-06 PROCEDURE — 99214 OFFICE O/P EST MOD 30 MIN: CPT | Performed by: FAMILY MEDICINE

## 2018-09-06 PROCEDURE — 1101F PT FALLS ASSESS-DOCD LE1/YR: CPT | Performed by: FAMILY MEDICINE

## 2018-09-06 RX ORDER — OXYCODONE AND ACETAMINOPHEN 10; 325 MG/1; MG/1
1 TABLET ORAL EVERY 8 HOURS PRN
Qty: 90 TABLET | Refills: 0 | Status: SHIPPED | OUTPATIENT
Start: 2018-09-08 | End: 2018-10-09 | Stop reason: SDUPTHER

## 2018-09-06 RX ORDER — FUROSEMIDE 20 MG/1
TABLET ORAL
Qty: 90 TABLET | Refills: 3 | Status: SHIPPED | OUTPATIENT
Start: 2018-09-06 | End: 2019-03-12 | Stop reason: SDUPTHER

## 2018-09-06 ASSESSMENT — ENCOUNTER SYMPTOMS
WHEEZING: 0
CONSTIPATION: 0
BACK PAIN: 1
CHEST TIGHTNESS: 0
DIARRHEA: 0
COUGH: 0
SHORTNESS OF BREATH: 0
VOMITING: 0
EYES NEGATIVE: 1
BLOOD IN STOOL: 0
NAUSEA: 0

## 2018-09-06 NOTE — PROGRESS NOTES
[facility] [address] [address]  Dept: [phone]  Dept Fax: [phone]  Loc: [phone]    Kathie Navas is a 80 y.o. female who presents today for her medical conditions/complaints as noted below. Kathie Navas is c/o of Arthritis (med refill)        HPI:     HPI  This patient is seen here regularly by the undersigned for management of chronic disease states. She does have a history of chronic midline low back pain with left-sided sciatica. She has arthritic pains affecting multiple joints. She has had previous fracture secondary to a fall. She had closed fracture of left pubis with the routine healing. She has significant chronic pain affecting both hips as well. The patient does utilize Percocet 10/325 every 8 hours intervals for treatment thereof. She does apply diclofenac 2% solution topically 2 areas of \"hot spot. \" She does utilize meloxicam 15 mg by mouth daily as well. She does have essential hypertension. She does utilize valsartan/HCTZ at 160/25 taken at 1 by mouth daily. The patient is on Lasix but has been only utilizing 20 mg daily instead of 40 so we are refilling her Lasix today at 20 mg by mouth daily. She knows to eat a banana or an orange which she uses medicines that deplete potassium such as diuretics. She does also use Cardizem 90 mg by mouth twice a day as well. Medications are well tolerated. Blood pressure today was outstanding at 120/76. She does have a history of GERD. She does utilize Nexium 40 mg by mouth daily and Carafate 1 g by mouth before meals and at bedtime. This medicine helps alleviate symptoms of reflux when he impacted by them. She does have acquired hypothyroidism. She is on levothyroxine at 50 µg by mouth daily. Medication is tolerated well and has been helpful with normalization of thyroid function studies. She does have a history of glaucoma.  She is on Xalatan 0.005% ophthalmic solution and applies 1 drop both eyes daily. She does have vitamin D deficiency. She remains on cholecalciferol at 1000 unit tablet taken by mouth daily. Past Medical History:   Diagnosis Date    Allergic rhinitis     Chronic back pain     Hiatal hernia     History of hematuria     History of shingles     Hyperglycemia     Hyperlipidemia     Hypertension     Hypertriglyceridemia     Hypothyroidism     MVP (mitral valve prolapse) \    Osteoarthritis       Past Surgical History:   Procedure Laterality Date    ADENOIDECTOMY      APPENDECTOMY      BACK SURGERY      BRAIN TUMOR EXCISION      Cystic    EYE SURGERY Right     laser surgery    HYSTERECTOMY      JOINT REPLACEMENT  03/11/2015    right hip replacement    TONSILLECTOMY         History reviewed. No pertinent family history. Social History   Substance Use Topics    Smoking status: Former Smoker     Types: Cigarettes    Smokeless tobacco: Never Used    Alcohol use No      Current Outpatient Prescriptions   Medication Sig Dispense Refill    [START ON 9/8/2018] oxyCODONE-acetaminophen (PERCOCET)  MG per tablet Take 1 tablet by mouth every 8 hours as needed for Pain for up to 30 days. Ebony Duong Date: 9/8/18 90 tablet 0    furosemide (LASIX) 20 MG tablet Take one pill a day 90 tablet 3    sucralfate (CARAFATE) 1 GM tablet Take 1 tablet by mouth 4 times daily Take with the meloxicam. 120 tablet 5    diclofenac 2 % SOLN Place 1 each onto the skin 4 times daily as needed (pain) 112 g 5    diltiazem (CARDIZEM) 90 MG tablet TAKE ONE TABLET BY MOUTH TWICE DAILY 180 tablet 3    esomeprazole (NEXIUM) 40 MG delayed release capsule TAKE ONE CAPSULE BY MOUTH EVERY DAY 90 capsule 3    meloxicam (MOBIC) 15 MG tablet Take 1 tablet by mouth daily 30 tablet 5    simvastatin (ZOCOR) 20 MG tablet TAKE 1 TABLET BY MOUTH AT BEDTIME (GENERIC FOR ZOCOR) 90 tablet 3    valsartan-hydrochlorothiazide (DIOVAN-HCT) 160-25 MG per tablet TAKE 1 TABLET BY MOUTH ONCE DAILY (GENERIC FOR DIOVAN HCT) 90 tablet 3    levothyroxine (SYNTHROID) 50 MCG tablet TAKE ONE TABLET BY MOUTH ONCE DAILY 90 tablet 3    aspirin 81 MG tablet Take 81 mg by mouth every other day One every other day 30 tablet     furosemide (LASIX) 40 MG tablet Take one pill a day 90 tablet 3    Multiple Vitamins-Minerals (PRESERVISION AREDS 2) CAPS Take  by mouth.  latanoprost (XALATAN) 0.005 % ophthalmic solution 1 drop daily.  Multiple Vitamins-Minerals (THERAPEUTIC MULTIVITAMIN-MINERALS) tablet Take 1 tablet by mouth daily.  calcium carbonate (OSCAL) 500 MG TABS tablet Take 500 mg by mouth daily.  vitamin D3 (CHOLECALCIFEROL) 1000 UNITS TABS tablet Take 1,000 Units by mouth daily. No current facility-administered medications for this visit. Allergies   Allergen Reactions    Mobic [Meloxicam] Nausea Only     Medication was very fairly effective but did cause some GI distress.  Naprosyn [Naproxen]      N/v      Bactrim [Sulfamethoxazole-Trimethoprim] Nausea And Vomiting       Health Maintenance   Topic Date Due    DTaP/Tdap/Td vaccine (1 - Tdap) 02/06/1952    Shingles Vaccine (1 of 2 - 2 Dose Series) 02/06/1983    Flu vaccine (1) 09/01/2018    TSH testing  04/20/2019    Potassium monitoring  04/20/2019    Creatinine monitoring  04/20/2019    DEXA (modify frequency per FRAX score)  Completed    Pneumococcal low/med risk  Completed       Subjective:      Review of Systems   Constitutional: Negative. HENT: Negative. Eyes: Negative. Respiratory: Negative for cough, chest tightness, shortness of breath and wheezing. Cardiovascular: Negative for chest pain, palpitations and leg swelling. Gastrointestinal: Negative for blood in stool, constipation, diarrhea, nausea and vomiting. Genitourinary: Negative for hematuria. Musculoskeletal: Positive for arthralgias and back pain. Skin: Negative. Neurological: Negative. Psychiatric/Behavioral: Negative. Objective:     Physical Exam   Constitutional: She is oriented to person, place, and time. Vital signs are normal. She appears well-developed. She is active. Weight remains in excess of ideal at 175 with BMI calculated at 30.04. HENT:   Head: Normocephalic and atraumatic. Right Ear: External ear normal.   Left Ear: External ear normal.   Nose: Nose normal.   Mouth/Throat: Oropharynx is clear and moist.   Eyes: Pupils are equal, round, and reactive to light. Conjunctivae and EOM are normal.   Neck: Normal range of motion and full passive range of motion without pain. Neck supple. Cardiovascular: Normal rate, regular rhythm, S1 normal, S2 normal, normal heart sounds and normal pulses. Pulmonary/Chest: Effort normal and breath sounds normal.   Abdominal: Soft. Normal appearance. Musculoskeletal: Normal range of motion. Neurological: She is alert and oriented to person, place, and time. She has normal strength and normal reflexes. Skin: Skin is warm, dry and intact. Psychiatric: She has a normal mood and affect. Her speech is normal and behavior is normal. Judgment and thought content normal. Cognition and memory are normal.   Nursing note and vitals reviewed. /76 (Site: Left Arm, Position: Sitting, Cuff Size: Medium Adult)   Pulse 65   Temp 98.1 °F (36.7 °C) (Temporal)   Wt 175 lb (79.4 kg)   SpO2 95%   BMI 30.04 kg/m²     Assessment:       Diagnosis Orders   1. Primary osteoarthritis involving multiple joints  oxyCODONE-acetaminophen (PERCOCET)  MG per tablet   2. Arthritis  oxyCODONE-acetaminophen (PERCOCET)  MG per tablet   3. Closed fracture of left pubis with routine healing, unspecified portion of pubis, subsequent encounter  oxyCODONE-acetaminophen (PERCOCET)  MG per tablet   4. Chronic midline low back pain with left-sided sciatica  oxyCODONE-acetaminophen (PERCOCET)  MG per tablet   5.  Pain of both hip joints oxyCODONE-acetaminophen (PERCOCET)  MG per tablet   6. Acquired hypothyroidism     7. Hypertriglyceridemia     8. Gastroesophageal reflux disease without esophagitis     9. Vitamin D deficiency     10. Essential hypertension         Plan:       Orders Placed This Encounter   Medications    oxyCODONE-acetaminophen (PERCOCET)  MG per tablet     Sig: Take 1 tablet by mouth every 8 hours as needed for Pain for up to 30 days. Lindenparamjit Castellano Date: 9/8/18     Dispense:  90 tablet     Refill:  0    furosemide (LASIX) 20 MG tablet     Sig: Take one pill a day     Dispense:  90 tablet     Refill:  3     Please consider 90 day supplies to promote better adherence       Patient offered educational materials - see patient instructions. Discussed use, benefit, and side effects of prescribed medications. All patient questions answered. Pt voiced understanding. Reviewed health maintenance. Instructed to continue current medications, diet and exercise. Patient agreed with treatment plan. Follow up as directed. This dictation was generated by voice recognition computer software. Although all attempts are made to edit the dictation for accuracy, there may be errors in the transcription that are not intended.       Electronically signed by Jason Stout MD on 9/6/2018 at 3:36 PM

## 2018-10-05 ENCOUNTER — NURSE ONLY (OUTPATIENT)
Dept: FAMILY MEDICINE CLINIC | Age: 83
End: 2018-10-05
Payer: MEDICARE

## 2018-10-05 VITALS
SYSTOLIC BLOOD PRESSURE: 125 MMHG | BODY MASS INDEX: 30.55 KG/M2 | TEMPERATURE: 98.7 F | WEIGHT: 178 LBS | DIASTOLIC BLOOD PRESSURE: 66 MMHG

## 2018-10-05 DIAGNOSIS — Z23 NEEDS FLU SHOT: Primary | ICD-10-CM

## 2018-10-05 PROCEDURE — G0008 ADMIN INFLUENZA VIRUS VAC: HCPCS | Performed by: NURSE PRACTITIONER

## 2018-10-05 PROCEDURE — 90662 IIV NO PRSV INCREASED AG IM: CPT | Performed by: NURSE PRACTITIONER

## 2018-10-09 ENCOUNTER — OFFICE VISIT (OUTPATIENT)
Dept: FAMILY MEDICINE CLINIC | Age: 83
End: 2018-10-09
Payer: MEDICARE

## 2018-10-09 VITALS
DIASTOLIC BLOOD PRESSURE: 78 MMHG | TEMPERATURE: 98.1 F | OXYGEN SATURATION: 95 % | HEART RATE: 68 BPM | SYSTOLIC BLOOD PRESSURE: 122 MMHG | BODY MASS INDEX: 29.7 KG/M2 | WEIGHT: 173 LBS

## 2018-10-09 DIAGNOSIS — E55.9 VITAMIN D DEFICIENCY: ICD-10-CM

## 2018-10-09 DIAGNOSIS — E03.9 ACQUIRED HYPOTHYROIDISM: ICD-10-CM

## 2018-10-09 DIAGNOSIS — M54.42 CHRONIC MIDLINE LOW BACK PAIN WITH LEFT-SIDED SCIATICA: ICD-10-CM

## 2018-10-09 DIAGNOSIS — E78.1 HYPERTRIGLYCERIDEMIA: ICD-10-CM

## 2018-10-09 DIAGNOSIS — I10 ESSENTIAL HYPERTENSION: ICD-10-CM

## 2018-10-09 DIAGNOSIS — K21.9 GASTROESOPHAGEAL REFLUX DISEASE WITHOUT ESOPHAGITIS: ICD-10-CM

## 2018-10-09 DIAGNOSIS — M25.551 PAIN OF BOTH HIP JOINTS: ICD-10-CM

## 2018-10-09 DIAGNOSIS — S32.502D CLOSED FRACTURE OF LEFT PUBIS WITH ROUTINE HEALING, UNSPECIFIED PORTION OF PUBIS, SUBSEQUENT ENCOUNTER: ICD-10-CM

## 2018-10-09 DIAGNOSIS — M25.552 PAIN OF BOTH HIP JOINTS: ICD-10-CM

## 2018-10-09 DIAGNOSIS — R01.1 HEART MURMUR: ICD-10-CM

## 2018-10-09 DIAGNOSIS — M19.90 ARTHRITIS: ICD-10-CM

## 2018-10-09 DIAGNOSIS — H65.90 SEROUS OTITIS MEDIA, UNSPECIFIED CHRONICITY, UNSPECIFIED LATERALITY: ICD-10-CM

## 2018-10-09 DIAGNOSIS — G89.29 CHRONIC MIDLINE LOW BACK PAIN WITH LEFT-SIDED SCIATICA: ICD-10-CM

## 2018-10-09 DIAGNOSIS — M15.9 PRIMARY OSTEOARTHRITIS INVOLVING MULTIPLE JOINTS: ICD-10-CM

## 2018-10-09 PROCEDURE — 1036F TOBACCO NON-USER: CPT | Performed by: FAMILY MEDICINE

## 2018-10-09 PROCEDURE — G8427 DOCREV CUR MEDS BY ELIG CLIN: HCPCS | Performed by: FAMILY MEDICINE

## 2018-10-09 PROCEDURE — 4040F PNEUMOC VAC/ADMIN/RCVD: CPT | Performed by: FAMILY MEDICINE

## 2018-10-09 PROCEDURE — G8417 CALC BMI ABV UP PARAM F/U: HCPCS | Performed by: FAMILY MEDICINE

## 2018-10-09 PROCEDURE — 1101F PT FALLS ASSESS-DOCD LE1/YR: CPT | Performed by: FAMILY MEDICINE

## 2018-10-09 PROCEDURE — 99214 OFFICE O/P EST MOD 30 MIN: CPT | Performed by: FAMILY MEDICINE

## 2018-10-09 PROCEDURE — G8482 FLU IMMUNIZE ORDER/ADMIN: HCPCS | Performed by: FAMILY MEDICINE

## 2018-10-09 PROCEDURE — 1123F ACP DISCUSS/DSCN MKR DOCD: CPT | Performed by: FAMILY MEDICINE

## 2018-10-09 PROCEDURE — G8399 PT W/DXA RESULTS DOCUMENT: HCPCS | Performed by: FAMILY MEDICINE

## 2018-10-09 PROCEDURE — 1090F PRES/ABSN URINE INCON ASSESS: CPT | Performed by: FAMILY MEDICINE

## 2018-10-09 RX ORDER — TRIAMCINOLONE ACETONIDE 40 MG/ML
80 INJECTION, SUSPENSION INTRA-ARTICULAR; INTRAMUSCULAR ONCE
Status: COMPLETED | OUTPATIENT
Start: 2018-10-09 | End: 2018-10-09

## 2018-10-09 RX ORDER — OXYCODONE AND ACETAMINOPHEN 10; 325 MG/1; MG/1
1 TABLET ORAL EVERY 8 HOURS PRN
Qty: 90 TABLET | Refills: 0 | Status: SHIPPED | OUTPATIENT
Start: 2018-10-09 | End: 2018-11-19 | Stop reason: SDUPTHER

## 2018-10-09 RX ADMIN — TRIAMCINOLONE ACETONIDE 80 MG: 40 INJECTION, SUSPENSION INTRA-ARTICULAR; INTRAMUSCULAR at 15:01

## 2018-10-09 ASSESSMENT — ENCOUNTER SYMPTOMS
BLOOD IN STOOL: 0
BACK PAIN: 1
EYES NEGATIVE: 1
DIARRHEA: 0
CONSTIPATION: 0
CHEST TIGHTNESS: 0
SHORTNESS OF BREATH: 0
NAUSEA: 0
WHEEZING: 0
COUGH: 0
VOMITING: 0

## 2018-10-09 NOTE — PROGRESS NOTES
[facility] [address] [address]  Dept: [phone]  Dept Fax: [phone]  Loc: [phone]    Grace Javier is a 80 y.o. female who presents today for her medical conditions/complaints as noted below. Grace Javier is c/o of Hoarse (having problems with her voice going hoarse); Ear Problem (felt like she had fluid on her ears); Wound Check (wound on left leg at knee); and Arthritis (med refill)        HPI:     HPI  This patient is seen here regularly by the undersigned for management of chronic disease states. Additionally we see her as new problems arise as well. Today she is here complaining that her voice gets hoarse towards the latter part of the day. She states that she has discomfort in her right ear is feels like there is something in her ear. We did examine her and ear structures are totally intact with tympanic membranes normal and light reflex bilaterally. She is noted to have some minimal Cerumenex bilaterally but nonobstructing. 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 I believe that this is likely to be allergy in relationship. Give her Kenalog 80 mg IM injection and see if that will rectify the situation. She does have significant issues with chronic arthritis and likely as not, the Kenalog will help that issue as well. She does have a history of primary osteoarthritis affecting multiple joints. She has had prior fracture of her left pubis and this has healed. She also suffers with chronic midline low back pain with left-sided sciatica. Both hips hurt her a good bit as well. Maintained presently for chronic pain on Percocet 10/325 a every 8 hours intervals for her pain. Additionally she takes Mobitz 15 mg by mouth daily all of which remain helpful. She does have a history of essential hypertension.  She is maintained on diltiazem 90 mg by mouth

## 2018-11-19 ENCOUNTER — OFFICE VISIT (OUTPATIENT)
Dept: FAMILY MEDICINE CLINIC | Age: 83
End: 2018-11-19
Payer: MEDICARE

## 2018-11-19 VITALS
HEIGHT: 65 IN | WEIGHT: 167 LBS | HEART RATE: 80 BPM | OXYGEN SATURATION: 96 % | BODY MASS INDEX: 27.82 KG/M2 | DIASTOLIC BLOOD PRESSURE: 72 MMHG | RESPIRATION RATE: 16 BRPM | TEMPERATURE: 99.1 F | SYSTOLIC BLOOD PRESSURE: 106 MMHG

## 2018-11-19 DIAGNOSIS — K21.9 GASTROESOPHAGEAL REFLUX DISEASE WITHOUT ESOPHAGITIS: ICD-10-CM

## 2018-11-19 DIAGNOSIS — M15.9 PRIMARY OSTEOARTHRITIS INVOLVING MULTIPLE JOINTS: ICD-10-CM

## 2018-11-19 DIAGNOSIS — M25.551 PAIN OF BOTH HIP JOINTS: ICD-10-CM

## 2018-11-19 DIAGNOSIS — M19.90 ARTHRITIS: ICD-10-CM

## 2018-11-19 DIAGNOSIS — M25.552 PAIN OF BOTH HIP JOINTS: ICD-10-CM

## 2018-11-19 DIAGNOSIS — I10 ESSENTIAL HYPERTENSION: ICD-10-CM

## 2018-11-19 DIAGNOSIS — E78.1 HYPERTRIGLYCERIDEMIA: ICD-10-CM

## 2018-11-19 DIAGNOSIS — J30.2 SEASONAL ALLERGIC RHINITIS, UNSPECIFIED TRIGGER: ICD-10-CM

## 2018-11-19 DIAGNOSIS — M54.42 CHRONIC MIDLINE LOW BACK PAIN WITH LEFT-SIDED SCIATICA: ICD-10-CM

## 2018-11-19 DIAGNOSIS — S32.502D CLOSED FRACTURE OF LEFT PUBIS WITH ROUTINE HEALING, UNSPECIFIED PORTION OF PUBIS, SUBSEQUENT ENCOUNTER: ICD-10-CM

## 2018-11-19 DIAGNOSIS — G89.29 CHRONIC MIDLINE LOW BACK PAIN WITH LEFT-SIDED SCIATICA: ICD-10-CM

## 2018-11-19 DIAGNOSIS — E55.9 VITAMIN D DEFICIENCY: ICD-10-CM

## 2018-11-19 DIAGNOSIS — E03.9 ACQUIRED HYPOTHYROIDISM: ICD-10-CM

## 2018-11-19 PROCEDURE — G8482 FLU IMMUNIZE ORDER/ADMIN: HCPCS | Performed by: FAMILY MEDICINE

## 2018-11-19 PROCEDURE — 99214 OFFICE O/P EST MOD 30 MIN: CPT | Performed by: FAMILY MEDICINE

## 2018-11-19 PROCEDURE — 1090F PRES/ABSN URINE INCON ASSESS: CPT | Performed by: FAMILY MEDICINE

## 2018-11-19 PROCEDURE — G8399 PT W/DXA RESULTS DOCUMENT: HCPCS | Performed by: FAMILY MEDICINE

## 2018-11-19 PROCEDURE — G8417 CALC BMI ABV UP PARAM F/U: HCPCS | Performed by: FAMILY MEDICINE

## 2018-11-19 PROCEDURE — 1101F PT FALLS ASSESS-DOCD LE1/YR: CPT | Performed by: FAMILY MEDICINE

## 2018-11-19 PROCEDURE — 1123F ACP DISCUSS/DSCN MKR DOCD: CPT | Performed by: FAMILY MEDICINE

## 2018-11-19 PROCEDURE — 4040F PNEUMOC VAC/ADMIN/RCVD: CPT | Performed by: FAMILY MEDICINE

## 2018-11-19 PROCEDURE — G8427 DOCREV CUR MEDS BY ELIG CLIN: HCPCS | Performed by: FAMILY MEDICINE

## 2018-11-19 PROCEDURE — 1036F TOBACCO NON-USER: CPT | Performed by: FAMILY MEDICINE

## 2018-11-19 RX ORDER — OXYCODONE AND ACETAMINOPHEN 10; 325 MG/1; MG/1
1 TABLET ORAL EVERY 8 HOURS PRN
Qty: 90 TABLET | Refills: 0 | Status: SHIPPED | OUTPATIENT
Start: 2018-11-19 | End: 2019-01-28 | Stop reason: SDUPTHER

## 2018-11-19 RX ORDER — MONTELUKAST SODIUM 10 MG/1
10 TABLET ORAL NIGHTLY
Qty: 30 TABLET | Refills: 3 | Status: SHIPPED | OUTPATIENT
Start: 2018-11-19 | End: 2020-01-01 | Stop reason: ALTCHOICE

## 2018-11-19 ASSESSMENT — ENCOUNTER SYMPTOMS
EYES NEGATIVE: 1
BLOOD IN STOOL: 0
COUGH: 0
SHORTNESS OF BREATH: 0
CHEST TIGHTNESS: 0
VOMITING: 0
DIARRHEA: 0
WHEEZING: 0
NAUSEA: 0
CONSTIPATION: 0
BACK PAIN: 1

## 2018-11-19 NOTE — PROGRESS NOTES
Pain of both hip joints  oxyCODONE-acetaminophen (PERCOCET)  MG per tablet   6. Acquired hypothyroidism     7. Hypertriglyceridemia     8. Gastroesophageal reflux disease without esophagitis     9. Vitamin D deficiency     10. Essential hypertension     11. Seasonal allergic rhinitis, unspecified trigger  montelukast (SINGULAIR) 10 MG tablet       Plan:       Orders Placed This Encounter   Medications    oxyCODONE-acetaminophen (PERCOCET)  MG per tablet     Sig: Take 1 tablet by mouth every 8 hours as needed for Pain for up to 30 days. Anna Mendiola Date: 11/19/18     Dispense:  90 tablet     Refill:  0    montelukast (SINGULAIR) 10 MG tablet     Sig: Take 1 tablet by mouth nightly     Dispense:  30 tablet     Refill:  3       Patient offered educational materials - see patient instructions. Discussed use, benefit, and side effects of prescribed medications. All patient questions answered. Pt voiced understanding. Reviewed healthmaintenance. Instructed to continue current medications, diet and exercise. Patient agreed with treatment plan. Follow up as directed. This dictation wasgenerated by voice recognition computer software. Although all attempts are made to edit the dictation for accuracy, there may be errors in the transcription that are not intended.       Electronically signed by Katt Becerra MD on 11/19/2018 at 3:27 PM

## 2018-11-29 DIAGNOSIS — E03.9 HYPOTHYROIDISM: ICD-10-CM

## 2018-11-30 RX ORDER — FUROSEMIDE 20 MG/1
TABLET ORAL
Qty: 60 TABLET | Refills: 5 | Status: SHIPPED | OUTPATIENT
Start: 2018-11-30 | End: 2019-11-15 | Stop reason: SDUPTHER

## 2018-11-30 RX ORDER — LEVOTHYROXINE SODIUM 0.05 MG/1
TABLET ORAL
Qty: 90 TABLET | Refills: 3 | Status: SHIPPED | OUTPATIENT
Start: 2018-11-30 | End: 2019-11-15 | Stop reason: SDUPTHER

## 2018-12-10 DIAGNOSIS — G89.29 CHRONIC MIDLINE LOW BACK PAIN WITHOUT SCIATICA: ICD-10-CM

## 2018-12-10 DIAGNOSIS — I10 ESSENTIAL HYPERTENSION: Primary | ICD-10-CM

## 2018-12-10 DIAGNOSIS — M54.50 CHRONIC MIDLINE LOW BACK PAIN WITHOUT SCIATICA: ICD-10-CM

## 2018-12-10 DIAGNOSIS — I10 ESSENTIAL HYPERTENSION: ICD-10-CM

## 2018-12-10 RX ORDER — LISINOPRIL AND HYDROCHLOROTHIAZIDE 12.5; 1 MG/1; MG/1
1 TABLET ORAL 2 TIMES DAILY
Qty: 180 TABLET | Refills: 3 | Status: SHIPPED | OUTPATIENT
Start: 2018-12-10 | End: 2019-08-07

## 2018-12-10 RX ORDER — VALSARTAN AND HYDROCHLOROTHIAZIDE 160; 25 MG/1; MG/1
TABLET ORAL
Qty: 90 TABLET | Refills: 3 | Status: SHIPPED | OUTPATIENT
Start: 2018-12-10 | End: 2018-12-10 | Stop reason: ALTCHOICE

## 2018-12-10 RX ORDER — SIMVASTATIN 20 MG
TABLET ORAL
Qty: 90 TABLET | Refills: 3 | Status: SHIPPED | OUTPATIENT
Start: 2018-12-10 | End: 2019-01-29 | Stop reason: ALTCHOICE

## 2018-12-26 DIAGNOSIS — G89.29 CHRONIC MIDLINE LOW BACK PAIN WITHOUT SCIATICA: ICD-10-CM

## 2018-12-26 DIAGNOSIS — M54.50 CHRONIC MIDLINE LOW BACK PAIN WITHOUT SCIATICA: ICD-10-CM

## 2018-12-26 DIAGNOSIS — I10 ESSENTIAL HYPERTENSION: ICD-10-CM

## 2018-12-27 RX ORDER — SIMVASTATIN 20 MG
TABLET ORAL
Qty: 90 TABLET | Refills: 3 | Status: SHIPPED | OUTPATIENT
Start: 2018-12-27 | End: 2019-01-29 | Stop reason: ALTCHOICE

## 2018-12-27 RX ORDER — VALSARTAN AND HYDROCHLOROTHIAZIDE 160; 25 MG/1; MG/1
TABLET ORAL
Qty: 90 TABLET | Refills: 3 | Status: SHIPPED | OUTPATIENT
Start: 2018-12-27 | End: 2019-03-12 | Stop reason: ALTCHOICE

## 2019-01-28 ENCOUNTER — OFFICE VISIT (OUTPATIENT)
Dept: FAMILY MEDICINE CLINIC | Age: 84
End: 2019-01-28
Payer: MEDICARE

## 2019-01-28 VITALS
BODY MASS INDEX: 27.42 KG/M2 | HEART RATE: 86 BPM | SYSTOLIC BLOOD PRESSURE: 130 MMHG | TEMPERATURE: 97.8 F | RESPIRATION RATE: 16 BRPM | HEIGHT: 65 IN | OXYGEN SATURATION: 96 % | DIASTOLIC BLOOD PRESSURE: 80 MMHG | WEIGHT: 164.6 LBS

## 2019-01-28 DIAGNOSIS — M25.552 PAIN OF BOTH HIP JOINTS: ICD-10-CM

## 2019-01-28 DIAGNOSIS — E78.1 HYPERTRIGLYCERIDEMIA: ICD-10-CM

## 2019-01-28 DIAGNOSIS — M54.42 CHRONIC MIDLINE LOW BACK PAIN WITH LEFT-SIDED SCIATICA: ICD-10-CM

## 2019-01-28 DIAGNOSIS — I10 ESSENTIAL HYPERTENSION: ICD-10-CM

## 2019-01-28 DIAGNOSIS — M15.9 PRIMARY OSTEOARTHRITIS INVOLVING MULTIPLE JOINTS: ICD-10-CM

## 2019-01-28 DIAGNOSIS — K21.9 GASTROESOPHAGEAL REFLUX DISEASE WITHOUT ESOPHAGITIS: ICD-10-CM

## 2019-01-28 DIAGNOSIS — S80.811A ABRASION OF ANTERIOR RIGHT LOWER LEG, INITIAL ENCOUNTER: ICD-10-CM

## 2019-01-28 DIAGNOSIS — E03.9 ACQUIRED HYPOTHYROIDISM: ICD-10-CM

## 2019-01-28 DIAGNOSIS — M25.551 PAIN OF BOTH HIP JOINTS: ICD-10-CM

## 2019-01-28 DIAGNOSIS — S32.502D CLOSED FRACTURE OF LEFT PUBIS WITH ROUTINE HEALING, UNSPECIFIED PORTION OF PUBIS, SUBSEQUENT ENCOUNTER: ICD-10-CM

## 2019-01-28 DIAGNOSIS — M19.90 ARTHRITIS: ICD-10-CM

## 2019-01-28 DIAGNOSIS — E55.9 VITAMIN D DEFICIENCY: ICD-10-CM

## 2019-01-28 DIAGNOSIS — G89.29 CHRONIC MIDLINE LOW BACK PAIN WITH LEFT-SIDED SCIATICA: ICD-10-CM

## 2019-01-28 PROCEDURE — 1036F TOBACCO NON-USER: CPT | Performed by: FAMILY MEDICINE

## 2019-01-28 PROCEDURE — 4040F PNEUMOC VAC/ADMIN/RCVD: CPT | Performed by: FAMILY MEDICINE

## 2019-01-28 PROCEDURE — G8427 DOCREV CUR MEDS BY ELIG CLIN: HCPCS | Performed by: FAMILY MEDICINE

## 2019-01-28 PROCEDURE — 1123F ACP DISCUSS/DSCN MKR DOCD: CPT | Performed by: FAMILY MEDICINE

## 2019-01-28 PROCEDURE — 1090F PRES/ABSN URINE INCON ASSESS: CPT | Performed by: FAMILY MEDICINE

## 2019-01-28 PROCEDURE — 99214 OFFICE O/P EST MOD 30 MIN: CPT | Performed by: FAMILY MEDICINE

## 2019-01-28 PROCEDURE — G8482 FLU IMMUNIZE ORDER/ADMIN: HCPCS | Performed by: FAMILY MEDICINE

## 2019-01-28 PROCEDURE — G8417 CALC BMI ABV UP PARAM F/U: HCPCS | Performed by: FAMILY MEDICINE

## 2019-01-28 PROCEDURE — G8399 PT W/DXA RESULTS DOCUMENT: HCPCS | Performed by: FAMILY MEDICINE

## 2019-01-28 PROCEDURE — 1101F PT FALLS ASSESS-DOCD LE1/YR: CPT | Performed by: FAMILY MEDICINE

## 2019-01-28 RX ORDER — OXYCODONE AND ACETAMINOPHEN 10; 325 MG/1; MG/1
1 TABLET ORAL EVERY 8 HOURS PRN
Qty: 90 TABLET | Refills: 0 | Status: SHIPPED | OUTPATIENT
Start: 2019-01-28 | End: 2019-02-27

## 2019-01-28 ASSESSMENT — ENCOUNTER SYMPTOMS
WHEEZING: 0
BACK PAIN: 1
VOMITING: 0
DIARRHEA: 0
CONSTIPATION: 0
EYES NEGATIVE: 1
BLOOD IN STOOL: 0
COUGH: 0
NAUSEA: 0
CHEST TIGHTNESS: 0
SHORTNESS OF BREATH: 0

## 2019-01-29 RX ORDER — SIMVASTATIN 10 MG
10 TABLET ORAL NIGHTLY
Qty: 30 TABLET | Refills: 3
Start: 2019-01-29 | End: 2019-03-12 | Stop reason: ALTCHOICE

## 2019-03-12 ENCOUNTER — OFFICE VISIT (OUTPATIENT)
Dept: FAMILY MEDICINE CLINIC | Age: 84
End: 2019-03-12
Payer: MEDICARE

## 2019-03-12 VITALS
BODY MASS INDEX: 26.73 KG/M2 | SYSTOLIC BLOOD PRESSURE: 130 MMHG | HEART RATE: 78 BPM | WEIGHT: 160.4 LBS | OXYGEN SATURATION: 96 % | DIASTOLIC BLOOD PRESSURE: 70 MMHG | HEIGHT: 65 IN | TEMPERATURE: 96.7 F | RESPIRATION RATE: 16 BRPM

## 2019-03-12 DIAGNOSIS — K44.9 HIATAL HERNIA: ICD-10-CM

## 2019-03-12 DIAGNOSIS — M54.50 CHRONIC MIDLINE LOW BACK PAIN WITHOUT SCIATICA: ICD-10-CM

## 2019-03-12 DIAGNOSIS — G89.29 CHRONIC MIDLINE LOW BACK PAIN WITH LEFT-SIDED SCIATICA: ICD-10-CM

## 2019-03-12 DIAGNOSIS — M17.0 OSTEOARTHRITIS OF BOTH KNEES, UNSPECIFIED OSTEOARTHRITIS TYPE: ICD-10-CM

## 2019-03-12 DIAGNOSIS — J30.2 SEASONAL ALLERGIC RHINITIS, UNSPECIFIED TRIGGER: ICD-10-CM

## 2019-03-12 DIAGNOSIS — G89.29 CHRONIC MIDLINE LOW BACK PAIN WITHOUT SCIATICA: ICD-10-CM

## 2019-03-12 DIAGNOSIS — I10 ESSENTIAL HYPERTENSION: ICD-10-CM

## 2019-03-12 DIAGNOSIS — S32.592D OTHER CLOSED FRACTURE OF LEFT PUBIS WITH ROUTINE HEALING, SUBSEQUENT ENCOUNTER: ICD-10-CM

## 2019-03-12 DIAGNOSIS — E78.1 HYPERTRIGLYCERIDEMIA: ICD-10-CM

## 2019-03-12 DIAGNOSIS — M54.42 CHRONIC MIDLINE LOW BACK PAIN WITH LEFT-SIDED SCIATICA: ICD-10-CM

## 2019-03-12 DIAGNOSIS — E03.9 ACQUIRED HYPOTHYROIDISM: ICD-10-CM

## 2019-03-12 PROCEDURE — G8427 DOCREV CUR MEDS BY ELIG CLIN: HCPCS | Performed by: FAMILY MEDICINE

## 2019-03-12 PROCEDURE — G8417 CALC BMI ABV UP PARAM F/U: HCPCS | Performed by: FAMILY MEDICINE

## 2019-03-12 PROCEDURE — 4040F PNEUMOC VAC/ADMIN/RCVD: CPT | Performed by: FAMILY MEDICINE

## 2019-03-12 PROCEDURE — 99214 OFFICE O/P EST MOD 30 MIN: CPT | Performed by: FAMILY MEDICINE

## 2019-03-12 PROCEDURE — 1101F PT FALLS ASSESS-DOCD LE1/YR: CPT | Performed by: FAMILY MEDICINE

## 2019-03-12 PROCEDURE — G8482 FLU IMMUNIZE ORDER/ADMIN: HCPCS | Performed by: FAMILY MEDICINE

## 2019-03-12 PROCEDURE — 1123F ACP DISCUSS/DSCN MKR DOCD: CPT | Performed by: FAMILY MEDICINE

## 2019-03-12 PROCEDURE — 1036F TOBACCO NON-USER: CPT | Performed by: FAMILY MEDICINE

## 2019-03-12 PROCEDURE — 1090F PRES/ABSN URINE INCON ASSESS: CPT | Performed by: FAMILY MEDICINE

## 2019-03-12 RX ORDER — OXYCODONE AND ACETAMINOPHEN 10; 325 MG/1; MG/1
1 TABLET ORAL EVERY 8 HOURS PRN
COMMUNITY
End: 2019-03-12 | Stop reason: SDUPTHER

## 2019-03-12 RX ORDER — SIMVASTATIN 20 MG
TABLET ORAL
Qty: 90 TABLET | Refills: 3 | Status: SHIPPED | OUTPATIENT
Start: 2019-03-12 | End: 2019-04-15 | Stop reason: SDUPTHER

## 2019-03-12 RX ORDER — OXYCODONE AND ACETAMINOPHEN 10; 325 MG/1; MG/1
1 TABLET ORAL EVERY 8 HOURS PRN
Qty: 90 TABLET | Refills: 0 | Status: SHIPPED | OUTPATIENT
Start: 2019-03-12 | End: 2019-04-22 | Stop reason: SDUPTHER

## 2019-03-12 ASSESSMENT — PATIENT HEALTH QUESTIONNAIRE - PHQ9
SUM OF ALL RESPONSES TO PHQ QUESTIONS 1-9: 2
SUM OF ALL RESPONSES TO PHQ QUESTIONS 1-9: 2
1. LITTLE INTEREST OR PLEASURE IN DOING THINGS: 1
SUM OF ALL RESPONSES TO PHQ9 QUESTIONS 1 & 2: 2
2. FEELING DOWN, DEPRESSED OR HOPELESS: 1

## 2019-03-12 ASSESSMENT — ENCOUNTER SYMPTOMS
CHEST TIGHTNESS: 0
BACK PAIN: 1
COUGH: 0
WHEEZING: 0
SHORTNESS OF BREATH: 0
EYES NEGATIVE: 1
CONSTIPATION: 0
NAUSEA: 0
VOMITING: 0
BLOOD IN STOOL: 0
DIARRHEA: 0

## 2019-04-15 DIAGNOSIS — G89.29 CHRONIC MIDLINE LOW BACK PAIN WITHOUT SCIATICA: ICD-10-CM

## 2019-04-15 DIAGNOSIS — M54.50 CHRONIC MIDLINE LOW BACK PAIN WITHOUT SCIATICA: ICD-10-CM

## 2019-04-15 RX ORDER — SIMVASTATIN 20 MG
TABLET ORAL
Qty: 90 TABLET | Refills: 3 | Status: SHIPPED | OUTPATIENT
Start: 2019-04-15 | End: 2019-04-22 | Stop reason: ALTCHOICE

## 2019-04-22 ENCOUNTER — OFFICE VISIT (OUTPATIENT)
Dept: FAMILY MEDICINE CLINIC | Age: 84
End: 2019-04-22
Payer: MEDICARE

## 2019-04-22 VITALS
SYSTOLIC BLOOD PRESSURE: 122 MMHG | DIASTOLIC BLOOD PRESSURE: 58 MMHG | HEART RATE: 68 BPM | WEIGHT: 157 LBS | TEMPERATURE: 97.8 F | OXYGEN SATURATION: 97 % | RESPIRATION RATE: 14 BRPM | BODY MASS INDEX: 26.53 KG/M2

## 2019-04-22 DIAGNOSIS — E78.1 HYPERTRIGLYCERIDEMIA: ICD-10-CM

## 2019-04-22 DIAGNOSIS — E55.9 VITAMIN D DEFICIENCY: ICD-10-CM

## 2019-04-22 DIAGNOSIS — G89.29 CHRONIC MIDLINE LOW BACK PAIN WITH LEFT-SIDED SCIATICA: ICD-10-CM

## 2019-04-22 DIAGNOSIS — M17.0 OSTEOARTHRITIS OF BOTH KNEES, UNSPECIFIED OSTEOARTHRITIS TYPE: ICD-10-CM

## 2019-04-22 DIAGNOSIS — E03.9 ACQUIRED HYPOTHYROIDISM: ICD-10-CM

## 2019-04-22 DIAGNOSIS — M54.42 CHRONIC MIDLINE LOW BACK PAIN WITH LEFT-SIDED SCIATICA: ICD-10-CM

## 2019-04-22 DIAGNOSIS — K21.9 GASTROESOPHAGEAL REFLUX DISEASE WITHOUT ESOPHAGITIS: ICD-10-CM

## 2019-04-22 DIAGNOSIS — I10 ESSENTIAL HYPERTENSION: ICD-10-CM

## 2019-04-22 PROCEDURE — 4040F PNEUMOC VAC/ADMIN/RCVD: CPT | Performed by: FAMILY MEDICINE

## 2019-04-22 PROCEDURE — 1090F PRES/ABSN URINE INCON ASSESS: CPT | Performed by: FAMILY MEDICINE

## 2019-04-22 PROCEDURE — 96372 THER/PROPH/DIAG INJ SC/IM: CPT | Performed by: FAMILY MEDICINE

## 2019-04-22 PROCEDURE — G8417 CALC BMI ABV UP PARAM F/U: HCPCS | Performed by: FAMILY MEDICINE

## 2019-04-22 PROCEDURE — G8427 DOCREV CUR MEDS BY ELIG CLIN: HCPCS | Performed by: FAMILY MEDICINE

## 2019-04-22 PROCEDURE — 1036F TOBACCO NON-USER: CPT | Performed by: FAMILY MEDICINE

## 2019-04-22 PROCEDURE — 1123F ACP DISCUSS/DSCN MKR DOCD: CPT | Performed by: FAMILY MEDICINE

## 2019-04-22 PROCEDURE — 99214 OFFICE O/P EST MOD 30 MIN: CPT | Performed by: FAMILY MEDICINE

## 2019-04-22 RX ORDER — ROSUVASTATIN CALCIUM 5 MG/1
5 TABLET, COATED ORAL NIGHTLY
Qty: 30 TABLET | Refills: 3 | Status: SHIPPED | OUTPATIENT
Start: 2019-04-22 | End: 2019-04-22 | Stop reason: SDUPTHER

## 2019-04-22 RX ORDER — OXYCODONE AND ACETAMINOPHEN 10; 325 MG/1; MG/1
1 TABLET ORAL EVERY 8 HOURS PRN
COMMUNITY
End: 2019-05-28 | Stop reason: SDUPTHER

## 2019-04-22 RX ORDER — ROSUVASTATIN CALCIUM 5 MG/1
5 TABLET, COATED ORAL NIGHTLY
Qty: 30 TABLET | Refills: 3 | Status: SHIPPED | OUTPATIENT
Start: 2019-04-22 | End: 2019-06-13 | Stop reason: ALTCHOICE

## 2019-04-22 RX ORDER — TRIAMCINOLONE ACETONIDE 40 MG/ML
80 INJECTION, SUSPENSION INTRA-ARTICULAR; INTRAMUSCULAR ONCE
Status: COMPLETED | OUTPATIENT
Start: 2019-04-22 | End: 2019-04-22

## 2019-04-22 RX ORDER — OXYCODONE AND ACETAMINOPHEN 10; 325 MG/1; MG/1
1 TABLET ORAL EVERY 8 HOURS PRN
Qty: 90 TABLET | Refills: 0 | Status: SHIPPED | OUTPATIENT
Start: 2019-04-22 | End: 2019-05-22

## 2019-04-22 RX ADMIN — TRIAMCINOLONE ACETONIDE 80 MG: 40 INJECTION, SUSPENSION INTRA-ARTICULAR; INTRAMUSCULAR at 15:05

## 2019-04-22 ASSESSMENT — ENCOUNTER SYMPTOMS
CHEST TIGHTNESS: 0
DIARRHEA: 0
BACK PAIN: 1
BLOOD IN STOOL: 0
VOMITING: 0
WHEEZING: 0
NAUSEA: 0
EYES NEGATIVE: 1
COUGH: 0
SHORTNESS OF BREATH: 0
CONSTIPATION: 0

## 2019-04-22 NOTE — PROGRESS NOTES
Subjective:      Patient ID: Moshe Hook is a 80 y.o. female. HPI  This patient is seen here regularly by the undersigned for management of chronic disease states. She does have a history of a closed fracture of her left pubis and also suffers with chronic midline low back pain with left-sided sciatica and bilateral hip pain. She does have documented osteoarthritis affecting multiple joints. She continues to require a walker to help facilitate ambulation. She continues to benefit from [facility] 10/325 utilized a every 8 hours for her chronic pain issues. She remains on meloxicam 15 mg by mouth daily with food as well. She is having a considerable increase in her chronic pain. She has benefited in the past when receiving cortisone injections. I therefore am administering 80 mg of Kenalog IM today. Additionally, she does remain saddened by the recent passing of her  of many years, Selma Schwartz, who was identified with a hematologic malignancy recently.      She does have essential hypertension. She is on valsartan/HCTZ at 160/25 at 1 by mouth daily. Additionally she takes Cardizem 90 mg by mouth twice a day and Lasix 20 mg by mouth daily. Meds are tolerated well and pressure today was good at 122/58.     She does have a history of acquired hypothyroidism. She continues to take Synthroid 50 µg by mouth daily which should remains tolerated well and has enabled normalization of thyroid function studies.     For history of glaucoma, she does utilize Xalatan 0.005% ophthalmic solution administered daily.     She has a history of GERD. She continues to utilize Nexium 40 mg by mouth daily and Carafate 1 g before meals and at bedtime which remain helpful.     She does have a history of chronic seasonal allergies but these presently remain stable.     For hyperlipidemia, she has been on simvastatin 20 mg by mouth daily at bedtime and an 81 mg aspirin taken as well.  She was told by the pharmacy that this did have Soft. Normal appearance and bowel sounds are normal. She exhibits no distension and no mass. There is no tenderness. There is no rebound and no guarding. Musculoskeletal: She exhibits no edema, tenderness or deformity. She does have significant stiffness of after having had the pelvic fracture. She does utilize a walker to help facilitate safe ambulation. Neurological: She is alert and oriented to person, place, and time. She has normal strength and normal reflexes. Skin: Skin is warm, dry and intact. Psychiatric: She has a normal mood and affect. Her speech is normal and behavior is normal. Judgment and thought content normal. Cognition and memory are normal.   Nursing note and vitals reviewed. BP (!) 122/58 (Site: Right Upper Arm, Position: Sitting, Cuff Size: Small Adult)   Pulse 68   Temp 97.8 °F (36.6 °C) (Temporal)   Resp 14   Wt 157 lb (71.2 kg)   SpO2 97%   BMI 26.53 kg/m²   Assessment:         Diagnosis Orders   1. Osteoarthritis of both knees, unspecified osteoarthritis type  oxyCODONE-acetaminophen (PERCOCET)  MG per tablet    triamcinolone acetonide (KENALOG-40) injection 80 mg   2. Hypertriglyceridemia  DISCONTINUED: rosuvastatin (CRESTOR) 5 MG tablet   3. Essential hypertension     4. Gastroesophageal reflux disease without esophagitis     5. Acquired hypothyroidism     6. Vitamin D deficiency     7. Chronic midline low back pain with left-sided sciatica             Plan:        No orders of the defined types were placed in this encounter. Orders Placed This Encounter   Medications    oxyCODONE-acetaminophen (PERCOCET)  MG per tablet     Sig: Take 1 tablet by mouth every 8 hours as needed for Pain for up to 30 days.      Dispense:  90 tablet     Refill:  0     Reduce doses taken as pain becomes manageable    DISCONTD: rosuvastatin (CRESTOR) 5 MG tablet     Sig: Take 1 tablet by mouth nightly     Dispense:  30 tablet     Refill:  3    triamcinolone acetonide

## 2019-05-28 ENCOUNTER — OFFICE VISIT (OUTPATIENT)
Dept: FAMILY MEDICINE CLINIC | Age: 84
End: 2019-05-28
Payer: MEDICARE

## 2019-05-28 VITALS
RESPIRATION RATE: 14 BRPM | DIASTOLIC BLOOD PRESSURE: 60 MMHG | SYSTOLIC BLOOD PRESSURE: 128 MMHG | WEIGHT: 154 LBS | OXYGEN SATURATION: 96 % | BODY MASS INDEX: 26.03 KG/M2 | HEART RATE: 77 BPM | TEMPERATURE: 98.4 F

## 2019-05-28 DIAGNOSIS — E55.9 VITAMIN D DEFICIENCY: ICD-10-CM

## 2019-05-28 DIAGNOSIS — E03.9 ACQUIRED HYPOTHYROIDISM: ICD-10-CM

## 2019-05-28 DIAGNOSIS — E78.5 HYPERLIPIDEMIA, UNSPECIFIED HYPERLIPIDEMIA TYPE: ICD-10-CM

## 2019-05-28 DIAGNOSIS — M54.42 CHRONIC MIDLINE LOW BACK PAIN WITH LEFT-SIDED SCIATICA: ICD-10-CM

## 2019-05-28 DIAGNOSIS — M17.0 OSTEOARTHRITIS OF BOTH KNEES, UNSPECIFIED OSTEOARTHRITIS TYPE: ICD-10-CM

## 2019-05-28 DIAGNOSIS — G89.29 CHRONIC MIDLINE LOW BACK PAIN WITH LEFT-SIDED SCIATICA: ICD-10-CM

## 2019-05-28 DIAGNOSIS — K21.9 GASTROESOPHAGEAL REFLUX DISEASE WITHOUT ESOPHAGITIS: ICD-10-CM

## 2019-05-28 DIAGNOSIS — E78.1 HYPERTRIGLYCERIDEMIA: ICD-10-CM

## 2019-05-28 PROCEDURE — 1090F PRES/ABSN URINE INCON ASSESS: CPT | Performed by: FAMILY MEDICINE

## 2019-05-28 PROCEDURE — G8417 CALC BMI ABV UP PARAM F/U: HCPCS | Performed by: FAMILY MEDICINE

## 2019-05-28 PROCEDURE — 1036F TOBACCO NON-USER: CPT | Performed by: FAMILY MEDICINE

## 2019-05-28 PROCEDURE — 1123F ACP DISCUSS/DSCN MKR DOCD: CPT | Performed by: FAMILY MEDICINE

## 2019-05-28 PROCEDURE — 99214 OFFICE O/P EST MOD 30 MIN: CPT | Performed by: FAMILY MEDICINE

## 2019-05-28 PROCEDURE — G8427 DOCREV CUR MEDS BY ELIG CLIN: HCPCS | Performed by: FAMILY MEDICINE

## 2019-05-28 PROCEDURE — 4040F PNEUMOC VAC/ADMIN/RCVD: CPT | Performed by: FAMILY MEDICINE

## 2019-05-28 RX ORDER — OXYCODONE AND ACETAMINOPHEN 10; 325 MG/1; MG/1
1 TABLET ORAL EVERY 8 HOURS PRN
Qty: 90 TABLET | Refills: 0 | Status: SHIPPED | OUTPATIENT
Start: 2019-05-28 | End: 2019-06-18 | Stop reason: SDUPTHER

## 2019-05-28 RX ORDER — ATORVASTATIN CALCIUM 20 MG/1
20 TABLET, FILM COATED ORAL NIGHTLY
Qty: 90 TABLET | Refills: 1 | Status: SHIPPED | OUTPATIENT
Start: 2019-05-28 | End: 2019-06-18 | Stop reason: ALTCHOICE

## 2019-05-28 ASSESSMENT — ENCOUNTER SYMPTOMS
NAUSEA: 0
DIARRHEA: 0
CHEST TIGHTNESS: 0
EYES NEGATIVE: 1
VOMITING: 0
BACK PAIN: 1
BLOOD IN STOOL: 0
CONSTIPATION: 0
WHEEZING: 0
SHORTNESS OF BREATH: 0
COUGH: 0

## 2019-05-28 NOTE — PROGRESS NOTES
Subjective:      Patient ID: Shiv Vargas is a 80 y.o. female. HPI  This patient is seen here regularly by the undersigned for management of chronic disease states. She does have a history of a closed fracture of her left pubis and also suffers with chronic midline low back pain with left-sided sciatica and bilateral hip pain. She does have documented osteoarthritis affecting multiple joints. She continues to require a walker to help facilitate ambulation. She continues to benefit from [facility] 10/325 utilized a every 8 hours for her chronic pain issues. She remains on meloxicam 15 mg by mouth daily with food as well.  she was given Kenalog 80 mg at her last office visit and reports that that was helpful as regard to her chronic pain issues.      Additionally, she does remain saddened by the recent passing of her  of many years, Lisset Pena, who was identified with a hematologic malignancy recently. Mrs. Dennys Garay states that she is getting along somewhat better presently.     She does have essential hypertension. She is on valsartan/HCTZ at 160/25 at 1 by mouth daily. Additionally she takes Cardizem 90 mg by mouth twice a day and Lasix 20 mg by mouth daily. Meds are tolerated well and pressure today was good at 128/60.     She does have a history of acquired hypothyroidism. She continues to take Synthroid 50 µg by mouth daily which should remains tolerated well and has enabled normalization of thyroid function studies.     For history of glaucoma, she does utilize Xalatan 0.005% ophthalmic solution administered daily.     She has a history of GERD. She continues to utilize Nexium 40 mg by mouth daily and Carafate 1 g before meals and at bedtime which remain helpful.     She does have a history of chronic seasonal allergies but these presently remain stable.     For hyperlipidemia, she has been on simvastatin 20 mg by mouth daily at bedtime and an 81 mg aspirin taken as well.  She was told by the pharmacy that this did have some interaction with Cardizem and doses exceeding 5 mg so we have not been of the opinion that 5 mg of simvastatin would be helpful so I am changing that in stopping the Zocor and changing her to  Crestor at 5 mg by mouth daily at bedtime. The most recent lipids reported showed triglyceride remains slightly elevated at 178 and cholesterol was good at 149. Other lipids were essentially unremarkable. She recently heard that simvastatin can cause cancer. She is concerned over the cost of the Crestor today. Though she only pays $8 for it there is a considerable amount of money over that amount that goes towards her medication threshold for the year so we're going to try Lipitor 20 mg. It will be written at 1 by mouth daily and she will start taking it one half daily to see if she tolerates it well.       Review of Systems   Constitutional: Negative. HENT: Negative. Eyes: Negative. Respiratory: Negative for cough, chest tightness, shortness of breath and wheezing. Cardiovascular: Negative for chest pain, palpitations and leg swelling. Gastrointestinal: Negative for blood in stool, constipation, diarrhea, nausea and vomiting. Genitourinary: Negative for hematuria. Musculoskeletal: Positive for arthralgias and back pain. Skin: Negative. Neurological: Negative. Psychiatric/Behavioral: Negative. Objective:   Physical Exam   Constitutional: She is oriented to person, place, and time. Vital signs are normal. She appears well-developed. She is active. Weight remains in excess of ideal somewhat at 154 with BMI noted at 26.03. HENT:   Head: Normocephalic and atraumatic. Right Ear: External ear normal.   Left Ear: External ear normal.   Nose: Nose normal.   Mouth/Throat: Oropharynx is clear and moist.   Eyes: Pupils are equal, round, and reactive to light. Conjunctivae and EOM are normal.   Neck: Normal range of motion and full passive range of motion without pain.  Neck supple. No thyromegaly present. Cardiovascular: Normal rate, regular rhythm, S1 normal, S2 normal, normal heart sounds and normal pulses. Exam reveals no gallop and no friction rub. No murmur heard. Pulmonary/Chest: Effort normal and breath sounds normal. No stridor. No respiratory distress. She has no wheezes. She has no rales. Abdominal: Soft. Normal appearance and bowel sounds are normal. She exhibits no distension and no mass. There is no tenderness. There is no rebound and no guarding. Musculoskeletal: Normal range of motion. She exhibits no edema, tenderness or deformity. Neurological: She is alert and oriented to person, place, and time. She has normal strength and normal reflexes. Skin: Skin is warm, dry and intact. Psychiatric: She has a normal mood and affect. Her speech is normal and behavior is normal. Judgment and thought content normal. Cognition and memory are normal.   Nursing note and vitals reviewed. /60 (Site: Right Upper Arm, Position: Sitting, Cuff Size: Large Adult)   Pulse 77   Temp 98.4 °F (36.9 °C) (Temporal)   Resp 14   Wt 154 lb (69.9 kg)   SpO2 96%   BMI 26.03 kg/m²   Assessment:         Diagnosis Orders   1. Osteoarthritis of both knees, unspecified osteoarthritis type  oxyCODONE-acetaminophen (PERCOCET)  MG per tablet   2. Chronic midline low back pain with left-sided sciatica  oxyCODONE-acetaminophen (PERCOCET)  MG per tablet   3. Hypertriglyceridemia  atorvastatin (LIPITOR) 20 MG tablet   4. Hyperlipidemia, unspecified hyperlipidemia type  atorvastatin (LIPITOR) 20 MG tablet   5. Gastroesophageal reflux disease without esophagitis     6. Acquired hypothyroidism     7. Vitamin D deficiency             Plan:      I am having Ms. Lor Singleton maintain her :   Outpatient Medications Marked as Taking for the 5/28/19 encounter (Office Visit) with Snow Santiago MD   Medication Sig Dispense Refill    oxyCODONE-acetaminophen (PERCOCET)  MG per tablet Take 1 tablet by mouth every 8 hours as needed for Pain (Take 1 tablet every 8 hours as needed for pain.) for up to 30 days. 90 tablet 0    atorvastatin (LIPITOR) 20 MG tablet Take 1 tablet by mouth nightly 90 tablet 1    rosuvastatin (CRESTOR) 5 MG tablet Take 1 tablet by mouth nightly 30 tablet 3    lisinopril-hydrochlorothiazide (PRINZIDE;ZESTORETIC) 10-12.5 MG per tablet Take 1 tablet by mouth 2 times daily 180 tablet 3    levothyroxine (SYNTHROID) 50 MCG tablet TAKE ONE TABLET BY MOUTH ONCE DAILY 90 tablet 3    furosemide (LASIX) 20 MG tablet TAKE ONE TABLET BY MOUTH ONCE DAILY 60 tablet 5    montelukast (SINGULAIR) 10 MG tablet Take 1 tablet by mouth nightly 30 tablet 3    sucralfate (CARAFATE) 1 GM tablet Take 1 tablet by mouth 4 times daily Take with the meloxicam. 120 tablet 5    diclofenac 2 % SOLN Place 1 each onto the skin 4 times daily as needed (pain) 112 g 5    diltiazem (CARDIZEM) 90 MG tablet TAKE ONE TABLET BY MOUTH TWICE DAILY 180 tablet 3    esomeprazole (NEXIUM) 40 MG delayed release capsule TAKE ONE CAPSULE BY MOUTH EVERY DAY 90 capsule 3    meloxicam (MOBIC) 15 MG tablet Take 1 tablet by mouth daily 30 tablet 5    aspirin 81 MG tablet Take 81 mg by mouth every other day One every other day 30 tablet     Multiple Vitamins-Minerals (PRESERVISION AREDS 2) CAPS Take  by mouth.  latanoprost (XALATAN) 0.005 % ophthalmic solution 1 drop daily.  Multiple Vitamins-Minerals (THERAPEUTIC MULTIVITAMIN-MINERALS) tablet Take 1 tablet by mouth daily.  calcium carbonate (OSCAL) 500 MG TABS tablet Take 500 mg by mouth daily.  vitamin D3 (CHOLECALCIFEROL) 1000 UNITS TABS tablet Take 1,000 Units by mouth daily.      ,  Medications Discontinued During This Encounter   Medication Reason    oxyCODONE-acetaminophen (PERCOCET)  MG per tablet REORDER    I have refilled the following medication today:   Requested Prescriptions     Signed Prescriptions Disp Refills  oxyCODONE-acetaminophen (PERCOCET)  MG per tablet 90 tablet 0     Sig: Take 1 tablet by mouth every 8 hours as needed for Pain (Take 1 tablet every 8 hours as needed for pain.) for up to 30 days.  atorvastatin (LIPITOR) 20 MG tablet 90 tablet 1     Sig: Take 1 tablet by mouth nightly   . Orders Placed This Encounter   Medications    oxyCODONE-acetaminophen (PERCOCET)  MG per tablet     Sig: Take 1 tablet by mouth every 8 hours as needed for Pain (Take 1 tablet every 8 hours as needed for pain.) for up to 30 days.      Dispense:  90 tablet     Refill:  0     Reduce doses taken as pain becomes manageable    atorvastatin (LIPITOR) 20 MG tablet     Sig: Take 1 tablet by mouth nightly     Dispense:  90 tablet     Refill:  1             Rodney Mckeon MD

## 2019-06-07 DIAGNOSIS — I10 ESSENTIAL HYPERTENSION: ICD-10-CM

## 2019-06-10 RX ORDER — DILTIAZEM HCL 90 MG
TABLET ORAL
Qty: 180 TABLET | Refills: 3 | Status: SHIPPED | OUTPATIENT
Start: 2019-06-10 | End: 2020-05-15

## 2019-06-18 ENCOUNTER — OFFICE VISIT (OUTPATIENT)
Dept: FAMILY MEDICINE CLINIC | Age: 84
End: 2019-06-18
Payer: MEDICARE

## 2019-06-18 VITALS
BODY MASS INDEX: 26.67 KG/M2 | HEART RATE: 81 BPM | HEIGHT: 64 IN | RESPIRATION RATE: 16 BRPM | OXYGEN SATURATION: 97 % | DIASTOLIC BLOOD PRESSURE: 50 MMHG | TEMPERATURE: 97.1 F | WEIGHT: 156.2 LBS | SYSTOLIC BLOOD PRESSURE: 100 MMHG

## 2019-06-18 DIAGNOSIS — E78.1 HYPERTRIGLYCERIDEMIA: ICD-10-CM

## 2019-06-18 DIAGNOSIS — K21.9 GASTROESOPHAGEAL REFLUX DISEASE WITHOUT ESOPHAGITIS: ICD-10-CM

## 2019-06-18 DIAGNOSIS — I10 ESSENTIAL HYPERTENSION: ICD-10-CM

## 2019-06-18 DIAGNOSIS — G89.29 CHRONIC MIDLINE LOW BACK PAIN WITH LEFT-SIDED SCIATICA: ICD-10-CM

## 2019-06-18 DIAGNOSIS — M17.0 OSTEOARTHRITIS OF BOTH KNEES, UNSPECIFIED OSTEOARTHRITIS TYPE: ICD-10-CM

## 2019-06-18 DIAGNOSIS — H61.22 IMPACTED CERUMEN OF LEFT EAR: ICD-10-CM

## 2019-06-18 DIAGNOSIS — M54.42 CHRONIC MIDLINE LOW BACK PAIN WITH LEFT-SIDED SCIATICA: ICD-10-CM

## 2019-06-18 PROCEDURE — 99214 OFFICE O/P EST MOD 30 MIN: CPT | Performed by: FAMILY MEDICINE

## 2019-06-18 PROCEDURE — G8427 DOCREV CUR MEDS BY ELIG CLIN: HCPCS | Performed by: FAMILY MEDICINE

## 2019-06-18 PROCEDURE — 1123F ACP DISCUSS/DSCN MKR DOCD: CPT | Performed by: FAMILY MEDICINE

## 2019-06-18 PROCEDURE — 1090F PRES/ABSN URINE INCON ASSESS: CPT | Performed by: FAMILY MEDICINE

## 2019-06-18 PROCEDURE — G8417 CALC BMI ABV UP PARAM F/U: HCPCS | Performed by: FAMILY MEDICINE

## 2019-06-18 PROCEDURE — 4040F PNEUMOC VAC/ADMIN/RCVD: CPT | Performed by: FAMILY MEDICINE

## 2019-06-18 PROCEDURE — 1036F TOBACCO NON-USER: CPT | Performed by: FAMILY MEDICINE

## 2019-06-18 RX ORDER — OXYCODONE AND ACETAMINOPHEN 10; 325 MG/1; MG/1
1 TABLET ORAL EVERY 8 HOURS PRN
Qty: 90 TABLET | Refills: 0 | Status: SHIPPED | OUTPATIENT
Start: 2019-06-18 | End: 2019-07-18

## 2019-06-18 RX ORDER — ROSUVASTATIN CALCIUM 5 MG/1
5 TABLET, COATED ORAL DAILY
COMMUNITY
End: 2019-06-18 | Stop reason: SDUPTHER

## 2019-06-18 RX ORDER — ROSUVASTATIN CALCIUM 5 MG/1
5 TABLET, COATED ORAL NIGHTLY
Qty: 90 TABLET | Refills: 3 | Status: SHIPPED | OUTPATIENT
Start: 2019-06-18 | End: 2019-06-18 | Stop reason: CLARIF

## 2019-06-18 RX ORDER — ROSUVASTATIN CALCIUM 5 MG/1
5 TABLET, COATED ORAL DAILY
Qty: 90 TABLET | Refills: 3 | Status: SHIPPED | OUTPATIENT
Start: 2019-06-18 | End: 2020-01-01 | Stop reason: SDUPTHER

## 2019-06-18 ASSESSMENT — ENCOUNTER SYMPTOMS
CHEST TIGHTNESS: 0
BLOOD IN STOOL: 0
NAUSEA: 0
WHEEZING: 0
CONSTIPATION: 0
COUGH: 0
EYES NEGATIVE: 1
VOMITING: 0
DIARRHEA: 0
SHORTNESS OF BREATH: 0

## 2019-06-18 NOTE — PROGRESS NOTES
Subjective:      Patient ID: Giovanny Farley is a 80 y.o. female. HPI  This patient is seen here regularly by the undersigned for management of chronic disease states. She does have a history of a closed fracture of her left pubis and also suffers with chronic midline low back pain with left-sided sciatica and bilateral hip pain. She does have documented osteoarthritis affecting multiple joints. She continues to require a walker to help facilitate ambulation. She continues to benefit from [facility] 10/325 utilized a every 8 hours for her chronic pain issues. She remains on meloxicam 15 mg by mouth daily with food as well.  she was given Kenalog 80 mg at her last office visit and reports that that was helpful as regard to her chronic pain issues.      Additionally, she does remain saddened by the recent passing of her  of many years, Tanya Corado, who was identified with a hematologic malignancy recently. Mrs. Eliseo Blankenship states that she is getting along somewhat better presently.     She does have essential hypertension. She is on valsartan/HCTZ at 160/25 at 1 by mouth daily. Additionally she takes Cardizem 90 mg by mouth twice a day and Lasix 20 mg by mouth daily. Meds are tolerated well and pressure today was good at 120/50     Additionally, today she does complain of cystic discomfort in her left ear. Examination of the ear did show this to be obstructed with cerumen in the left external canal.  This was irrigated clear by Pinky Price here in the office today.     She does have a history of acquired hypothyroidism. She continues to take Synthroid 50 µg by mouth daily which should remains tolerated well and has enabled normalization of thyroid function studies.     For history of glaucoma, she does utilize Xalatan 0.005% ophthalmic solution administered daily.     She has a history of GERD.  She continues to utilize Nexium 40 mg by mouth daily and Carafate 1 g before meals and at bedtime which remain helpful.     She does have a history of chronic seasonal allergies but these presently remain stable.     For hyperlipidemia, she has been on simvastatin 20 mg by mouth daily at bedtime and an 81 mg aspirin taken as well. She was told by the pharmacy that this did have some interaction with Cardizem and doses exceeding 5 mg so we have not been of the opinion that 5 mg of simvastatin would be helpful so I am changing that in stopping the Zocor and changing her to  Crestor at 5 mg by mouth daily at bedtime. The most recent lipids reported showed triglyceride remains slightly elevated at 178 and cholesterol was good at 149. Other lipids were essentially unremarkable. She recently heard that simvastatin can cause cancer. She is concerned over the cost of the Crestor today. Though she only pays $8 for it there is a considerable amount of money over that amount that goes towards her medication threshold for the year so we're going to try Lipitor 20 mg. It will be written at 1 by mouth daily and she will start taking it one half daily to see if she tolerates it well.       Review of Systems   Constitutional: Negative. HENT: Negative. Eyes: Negative. Respiratory: Negative for cough, chest tightness, shortness of breath and wheezing. Cardiovascular: Negative for chest pain, palpitations and leg swelling. Gastrointestinal: Negative for blood in stool, constipation, diarrhea, nausea and vomiting. Genitourinary: Negative for hematuria. Musculoskeletal: Negative. Skin: Negative. Neurological: Negative. Psychiatric/Behavioral: Negative. Objective:   Physical Exam   Constitutional: She is oriented to person, place, and time. Vital signs are normal. She appears well-developed. She is active. Weight remains slightly in excess of ideal with BMI calculated at 26.61. HENT:   Head: Normocephalic and atraumatic.    Right Ear: External ear normal.   Nose: Nose normal.   Mouth/Throat: Oropharynx is clear and moist.   Left external auditory canal is irrigated clear of cerumen as it is nearly obstructed. Patient was able to hear more readily then. Eyes: Pupils are equal, round, and reactive to light. Conjunctivae and EOM are normal.   Neck: Normal range of motion and full passive range of motion without pain. Neck supple. No thyromegaly present. Cardiovascular: Normal rate, regular rhythm, S1 normal, S2 normal, normal heart sounds and normal pulses. Exam reveals no gallop and no friction rub. No murmur heard. Pulmonary/Chest: Effort normal and breath sounds normal. No stridor. No respiratory distress. She has no wheezes. She has no rales. Abdominal: Soft. Normal appearance and bowel sounds are normal. She exhibits no distension and no mass. There is no tenderness. There is no rebound and no guarding. Musculoskeletal: Normal range of motion. She exhibits no edema, tenderness or deformity. Neurological: She is alert and oriented to person, place, and time. She has normal strength and normal reflexes. Skin: Skin is warm, dry and intact. Psychiatric: She has a normal mood and affect. Her speech is normal and behavior is normal. Judgment and thought content normal. Cognition and memory are normal.   Nursing note and vitals reviewed. BP (!) 100/50 (Site: Left Upper Arm, Position: Sitting, Cuff Size: Medium Adult)   Pulse 81   Temp 97.1 °F (36.2 °C) (Temporal)   Resp 16   Ht 5' 4\" (1.626 m)   Wt 156 lb 3.2 oz (70.9 kg)   SpO2 97%   BMI 26.81 kg/m²   Assessment:         Diagnosis Orders   1. Chronic midline low back pain with left-sided sciatica  oxyCODONE-acetaminophen (PERCOCET)  MG per tablet   2. Impacted cerumen of left ear      Irrigated clear by Pinky Price   3. Hypertriglyceridemia  DISCONTINUED: rosuvastatin (CRESTOR) 5 MG tablet   4. Osteoarthritis of both knees, unspecified osteoarthritis type  oxyCODONE-acetaminophen (PERCOCET)  MG per tablet   5.  Essential

## 2019-08-07 ENCOUNTER — OFFICE VISIT (OUTPATIENT)
Dept: FAMILY MEDICINE CLINIC | Age: 84
End: 2019-08-07
Payer: MEDICARE

## 2019-08-07 VITALS
HEIGHT: 65 IN | SYSTOLIC BLOOD PRESSURE: 106 MMHG | WEIGHT: 157.8 LBS | OXYGEN SATURATION: 96 % | TEMPERATURE: 96.7 F | DIASTOLIC BLOOD PRESSURE: 50 MMHG | BODY MASS INDEX: 26.29 KG/M2 | HEART RATE: 69 BPM | RESPIRATION RATE: 16 BRPM

## 2019-08-07 DIAGNOSIS — I10 ESSENTIAL HYPERTENSION: ICD-10-CM

## 2019-08-07 DIAGNOSIS — E55.9 VITAMIN D DEFICIENCY: ICD-10-CM

## 2019-08-07 DIAGNOSIS — G89.29 CHRONIC MIDLINE LOW BACK PAIN WITH LEFT-SIDED SCIATICA: ICD-10-CM

## 2019-08-07 DIAGNOSIS — M54.42 CHRONIC MIDLINE LOW BACK PAIN WITH LEFT-SIDED SCIATICA: ICD-10-CM

## 2019-08-07 DIAGNOSIS — E78.1 HYPERTRIGLYCERIDEMIA: ICD-10-CM

## 2019-08-07 DIAGNOSIS — N32.89 BLADDER SPASMS: ICD-10-CM

## 2019-08-07 DIAGNOSIS — K21.9 GASTROESOPHAGEAL REFLUX DISEASE WITHOUT ESOPHAGITIS: ICD-10-CM

## 2019-08-07 DIAGNOSIS — R35.1 NOCTURIA: ICD-10-CM

## 2019-08-07 DIAGNOSIS — G47.33 OSA (OBSTRUCTIVE SLEEP APNEA): ICD-10-CM

## 2019-08-07 DIAGNOSIS — E03.9 ACQUIRED HYPOTHYROIDISM: ICD-10-CM

## 2019-08-07 DIAGNOSIS — M15.9 PRIMARY OSTEOARTHRITIS INVOLVING MULTIPLE JOINTS: ICD-10-CM

## 2019-08-07 DIAGNOSIS — I34.1 MVP (MITRAL VALVE PROLAPSE): ICD-10-CM

## 2019-08-07 PROCEDURE — 4040F PNEUMOC VAC/ADMIN/RCVD: CPT | Performed by: FAMILY MEDICINE

## 2019-08-07 PROCEDURE — 1090F PRES/ABSN URINE INCON ASSESS: CPT | Performed by: FAMILY MEDICINE

## 2019-08-07 PROCEDURE — 99214 OFFICE O/P EST MOD 30 MIN: CPT | Performed by: FAMILY MEDICINE

## 2019-08-07 PROCEDURE — 1123F ACP DISCUSS/DSCN MKR DOCD: CPT | Performed by: FAMILY MEDICINE

## 2019-08-07 PROCEDURE — G8417 CALC BMI ABV UP PARAM F/U: HCPCS | Performed by: FAMILY MEDICINE

## 2019-08-07 PROCEDURE — 1036F TOBACCO NON-USER: CPT | Performed by: FAMILY MEDICINE

## 2019-08-07 PROCEDURE — G8427 DOCREV CUR MEDS BY ELIG CLIN: HCPCS | Performed by: FAMILY MEDICINE

## 2019-08-07 RX ORDER — OXYCODONE AND ACETAMINOPHEN 10; 325 MG/1; MG/1
1 TABLET ORAL EVERY 8 HOURS PRN
COMMUNITY
End: 2019-08-07 | Stop reason: SDUPTHER

## 2019-08-07 RX ORDER — OXYCODONE AND ACETAMINOPHEN 10; 325 MG/1; MG/1
1 TABLET ORAL EVERY 8 HOURS PRN
Qty: 90 TABLET | Refills: 0 | Status: SHIPPED | OUTPATIENT
Start: 2019-08-07 | End: 2019-09-16 | Stop reason: SDUPTHER

## 2019-08-07 RX ORDER — TOLTERODINE 4 MG/1
4 CAPSULE, EXTENDED RELEASE ORAL DAILY
Qty: 30 CAPSULE | Refills: 3 | Status: SHIPPED | OUTPATIENT
Start: 2019-08-07 | End: 2019-09-12 | Stop reason: SINTOL

## 2019-08-07 RX ORDER — LISINOPRIL AND HYDROCHLOROTHIAZIDE 12.5; 1 MG/1; MG/1
1 TABLET ORAL DAILY
Qty: 180 TABLET | Refills: 3
Start: 2019-08-07 | End: 2019-09-12

## 2019-08-07 ASSESSMENT — ENCOUNTER SYMPTOMS
COUGH: 0
DIARRHEA: 0
BACK PAIN: 1
EYES NEGATIVE: 1
NAUSEA: 0
VOMITING: 0
BLOOD IN STOOL: 0
CONSTIPATION: 0
SHORTNESS OF BREATH: 0
WHEEZING: 0
CHEST TIGHTNESS: 0

## 2019-08-07 NOTE — PROGRESS NOTES
Subjective:      Patient ID: Gali Cortez is a 80 y.o. female. HPI  This patient is seen here regularly by the undersigned for management of chronic disease states. She does have a history of a closed fracture of her left pubis and also suffers with chronic midline low back pain with left-sided sciatica and bilateral hip pain. She does have documented osteoarthritis affecting multiple joints. She continues to require a walker to help facilitate ambulation. She continues to benefit from [facility] 10/325 utilized a every 8 hours for her chronic pain issues. She remains on meloxicam 15 mg by mouth daily with food as well.       Additionally, she does remain saddened by the recent passing of her  of many years, Sandra Bautista, who was identified with a hematologic malignancy recently. Mrs. Ramos Bueno states that she is getting along somewhat better presently.     She does have essential hypertension. She is on lisinopril/HCTZ 10/12.5 at 1 p.o. twice daily. . Additionally she takes Cardizem 90 mg by mouth twice a day and Lasix 20 mg by mouth daily. Meds are tolerated well and pressure today was good at 104/50. Because her blood pressure is so low today I am going to have her cut back on lisinopril/HCTZ to just 1 in the morning. She additionally had complained of having significant nocturia and this perhaps would help that as well. Additionally, I am going to have her bring back a urinalysis and put her on Detrol LA at 4 mg p.o. daily for likely bladder spasms.      She does have a history of acquired hypothyroidism. She continues to take Synthroid 50 µg by mouth daily which should remains tolerated well and has enabled normalization of thyroid function studies.     She has a history of GERD.  She continues to utilize Nexium 40 mg by mouth daily and Carafate 1 g before meals and at bedtime which remain helpful.     She does have a history of chronic seasonal allergies but these presently remain stable.     For hyperlipidemia, she has been on simvastatin 20 mg by mouth daily at bedtime and an 81 mg aspirin taken as well. She was told by the pharmacy that this did have some interaction with Cardizem and doses exceeding 5 mg so we have not been of the opinion that 5 mg of simvastatin would be helpful so I am changing that in stopping the Zocor and changing her to  Crestor at 5 mg by mouth daily at bedtime. The most recent lipids reported showed triglyceride remains slightly elevated at 178 and cholesterol was good at 149. Other lipids were essentially unremarkable. She recently heard that simvastatin can cause cancer. She is concerned over the cost of the Crestor today. Though she only pays $8 for it there is a considerable amount of money over that amount that goes towards her medication threshold for the year so we're going to try Lipitor 20 mg. It will be written at 1 by mouth daily and she will start taking it one half daily to see if she tolerates it well.     Review of Systems   Constitutional: Negative. HENT: Negative. Eyes: Negative. Respiratory: Negative for cough, chest tightness, shortness of breath and wheezing. Cardiovascular: Negative for chest pain, palpitations and leg swelling. Gastrointestinal: Negative for blood in stool, constipation, diarrhea, nausea and vomiting. Genitourinary: Negative for hematuria. Musculoskeletal: Positive for back pain and gait problem. Skin: Negative. Psychiatric/Behavioral: Negative. Objective:   Physical Exam   Constitutional: She is oriented to person, place, and time. Vital signs are normal. She appears well-developed and well-nourished. She is active. HENT:   Head: Normocephalic and atraumatic. Eyes: Pupils are equal, round, and reactive to light. Conjunctivae and EOM are normal.   Neck: Normal range of motion and full passive range of motion without pain. Neck supple. No thyromegaly present.    Cardiovascular: Normal rate, regular rhythm, S1 for up to 30 days. 90 tablet 0    tolterodine (DETROL LA) 4 MG extended release capsule Take 1 capsule by mouth daily 30 capsule 3    rosuvastatin (CRESTOR) 5 MG tablet Take 1 tablet by mouth daily 90 tablet 3    diltiazem (CARDIZEM) 90 MG tablet TAKE 1 TABLET BY MOUTH TWICE DAILY 180 tablet 3    [DISCONTINUED] lisinopril-hydrochlorothiazide (PRINZIDE;ZESTORETIC) 10-12.5 MG per tablet Take 1 tablet by mouth 2 times daily 180 tablet 3    levothyroxine (SYNTHROID) 50 MCG tablet TAKE ONE TABLET BY MOUTH ONCE DAILY 90 tablet 3    furosemide (LASIX) 20 MG tablet TAKE ONE TABLET BY MOUTH ONCE DAILY 60 tablet 5    montelukast (SINGULAIR) 10 MG tablet Take 1 tablet by mouth nightly 30 tablet 3    sucralfate (CARAFATE) 1 GM tablet Take 1 tablet by mouth 4 times daily Take with the meloxicam. 120 tablet 5    diclofenac 2 % SOLN Place 1 each onto the skin 4 times daily as needed (pain) 112 g 5    esomeprazole (NEXIUM) 40 MG delayed release capsule TAKE ONE CAPSULE BY MOUTH EVERY DAY 90 capsule 3    meloxicam (MOBIC) 15 MG tablet Take 1 tablet by mouth daily 30 tablet 5    aspirin 81 MG tablet Take 81 mg by mouth every other day One every other day 30 tablet     Multiple Vitamins-Minerals (PRESERVISION AREDS 2) CAPS Take  by mouth.  latanoprost (XALATAN) 0.005 % ophthalmic solution 1 drop daily.  Multiple Vitamins-Minerals (THERAPEUTIC MULTIVITAMIN-MINERALS) tablet Take 1 tablet by mouth daily.  calcium carbonate (OSCAL) 500 MG TABS tablet Take 500 mg by mouth daily.  vitamin D3 (CHOLECALCIFEROL) 1000 UNITS TABS tablet Take 1,000 Units by mouth daily.      ,  Medications Discontinued During This Encounter   Medication Reason    oxyCODONE-acetaminophen (PERCOCET)  MG per tablet REORDER     Requested Prescriptions     Signed Prescriptions Disp Refills    oxyCODONE-acetaminophen (PERCOCET)  MG per tablet 90 tablet 0     Sig: Take 1 tablet by mouth every 8 hours as needed for Pain for

## 2019-08-08 DIAGNOSIS — R35.1 NOCTURIA: ICD-10-CM

## 2019-08-08 LAB
BILIRUBIN URINE: NEGATIVE
BLOOD, URINE: NEGATIVE
CLARITY: ABNORMAL
COLOR: YELLOW
GLUCOSE URINE: NEGATIVE MG/DL
KETONES, URINE: NEGATIVE MG/DL
LEUKOCYTE ESTERASE, URINE: ABNORMAL
NITRITE, URINE: NEGATIVE
PH UA: 6.5 (ref 5–8)
PROTEIN UA: NEGATIVE MG/DL
SPECIFIC GRAVITY UA: 1.02 (ref 1–1.03)
URINE REFLEX TO CULTURE: YES
UROBILINOGEN, URINE: 0.2 E.U./DL

## 2019-08-10 LAB
ORGANISM: ABNORMAL
URINE CULTURE, ROUTINE: ABNORMAL
URINE CULTURE, ROUTINE: ABNORMAL

## 2019-08-14 ENCOUNTER — TELEPHONE (OUTPATIENT)
Dept: FAMILY MEDICINE CLINIC | Age: 84
End: 2019-08-14

## 2019-08-19 ENCOUNTER — OFFICE VISIT (OUTPATIENT)
Dept: CARDIOLOGY | Facility: CLINIC | Age: 84
End: 2019-08-19

## 2019-08-19 VITALS
DIASTOLIC BLOOD PRESSURE: 52 MMHG | BODY MASS INDEX: 29.45 KG/M2 | HEIGHT: 61 IN | HEART RATE: 61 BPM | WEIGHT: 156 LBS | SYSTOLIC BLOOD PRESSURE: 104 MMHG

## 2019-08-19 DIAGNOSIS — I50.32 CHRONIC DIASTOLIC CONGESTIVE HEART FAILURE (HCC): Primary | ICD-10-CM

## 2019-08-19 DIAGNOSIS — I10 BENIGN HYPERTENSION: ICD-10-CM

## 2019-08-19 DIAGNOSIS — I25.10 NON-OCCLUSIVE CORONARY ARTERY DISEASE: ICD-10-CM

## 2019-08-19 DIAGNOSIS — E78.5 HYPERLIPIDEMIA, UNSPECIFIED HYPERLIPIDEMIA TYPE: ICD-10-CM

## 2019-08-19 PROBLEM — E66.09 CLASS 1 OBESITY DUE TO EXCESS CALORIES WITH SERIOUS COMORBIDITY AND BODY MASS INDEX (BMI) OF 30.0 TO 30.9 IN ADULT: Status: RESOLVED | Noted: 2018-05-24 | Resolved: 2019-08-19

## 2019-08-19 PROCEDURE — 99214 OFFICE O/P EST MOD 30 MIN: CPT | Performed by: NURSE PRACTITIONER

## 2019-08-19 PROCEDURE — 93000 ELECTROCARDIOGRAM COMPLETE: CPT | Performed by: NURSE PRACTITIONER

## 2019-08-19 RX ORDER — ROSUVASTATIN CALCIUM 5 MG/1
5 TABLET, COATED ORAL DAILY
COMMUNITY

## 2019-08-19 NOTE — PROGRESS NOTES
Subjective:     Encounter Date:08/19/2019      Patient ID: Columba Venegas is a 86 y.o. female. She presents today for routine follow up. She has a history of chronic diastolic congestive heart failure, non-occlusive coronary artery disease, hypertension and hyperlipidemia. She denies chest pain, shortness of breath, palpitations, dizziness, syncope, orthopnea, PND, swelling or decreased stamina. She denies any signs of bleeding. She states that overall, she has felt well since her last visit.     Chief Complaint: routine follow up  Congestive Heart Failure   Presents for follow-up visit. Pertinent negatives include no abdominal pain, chest pain, chest pressure, claudication, edema, fatigue, muscle weakness, near-syncope, nocturia, orthopnea, palpitations, paroxysmal nocturnal dyspnea, shortness of breath or unexpected weight change. The symptoms have been stable. Her past medical history is significant for CAD. Compliance with total regimen is 51-75%. Compliance with diet is 51-75%. Compliance with exercise is 51-75%. Compliance with medications is %.   Coronary Artery Disease   Presents for follow-up visit. Pertinent negatives include no chest pain, chest pressure, chest tightness, dizziness, leg swelling, muscle weakness, palpitations, shortness of breath or weight gain. Risk factors include hyperlipidemia. Her past medical history is significant for CHF. The symptoms have been stable. Compliance with diet is variable. Compliance with exercise is variable. Compliance with medications is good.   Hypertension   This is a chronic problem. The current episode started more than 1 year ago. The problem is controlled. Pertinent negatives include no anxiety, blurred vision, chest pain, headaches, malaise/fatigue, neck pain, orthopnea, palpitations, peripheral edema, PND, shortness of breath or sweats. Risk factors for coronary artery disease include dyslipidemia and post-menopausal state. Current  antihypertension treatment includes angiotensin blockers and diuretics. The current treatment provides significant improvement. Hypertensive end-organ damage includes CAD/MI and heart failure.   Hyperlipidemia   This is a chronic problem. The current episode started more than 1 year ago. The problem is controlled. Pertinent negatives include no chest pain or shortness of breath. Current antihyperlipidemic treatment includes statins. Risk factors for coronary artery disease include hypertension, dyslipidemia and post-menopausal.       The following portions of the patient's history were reviewed and updated as appropriate: allergies, current medications, past family history, past medical history, past social history, past surgical history and problem list.     Allergies   Allergen Reactions   • Naproxen        Current Outpatient Medications:   •  aspirin 81 MG chewable tablet, Chew 81 mg Daily., Disp: , Rfl:   •  cetirizine (zyrTEC) 5 MG tablet, Take 5 mg by mouth Daily., Disp: , Rfl:   •  diltiaZEM SR (CARDIZEM SR) 90 MG 12 hr capsule, Take 90 mg by mouth 2 (Two) Times a Day., Disp: , Rfl:   •  docusate sodium 100 MG capsule, Take 100 mg by mouth 2 (Two) Times a Day. (Patient taking differently: Take 100 mg by mouth As Needed.), Disp: , Rfl:   •  esomeprazole (nexIUM) 40 MG capsule, Take 40 mg by mouth Every Morning Before Breakfast., Disp: , Rfl:   •  furosemide (LASIX) 20 MG tablet, Take 20 mg by mouth Daily., Disp: , Rfl:   •  levothyroxine (SYNTHROID, LEVOTHROID) 50 MCG tablet, Take 50 mcg by mouth Daily., Disp: , Rfl:   •  Multiple Vitamins-Minerals (PRESERVISION AREDS 2 PO), Take  by mouth., Disp: , Rfl:   •  Omega-3 Fatty Acids (FISH OIL) 1000 MG capsule capsule, Take 2,000 mg by mouth Daily With Breakfast., Disp: , Rfl:   •  polyethylene glycol (MIRALAX) pack packet, Take 17 g by mouth Daily. (Patient taking differently: Take 17 g by mouth Daily As Needed.), Disp: 7 each, Rfl:   •  rosuvastatin (CRESTOR) 5  MG tablet, Take 5 mg by mouth Daily., Disp: , Rfl:   •  oxyCODONE-acetaminophen (PERCOCET) 5-325 MG per tablet, Take 1 tablet by mouth Every 6 (Six) Hours As Needed for Severe Pain ., Disp: 20 tablet, Rfl: 0  •  simvastatin (ZOCOR) 20 MG tablet, Take 20 mg by mouth Every Night., Disp: , Rfl:   •  valsartan (DIOVAN) 160 MG tablet, Take 1 tablet by mouth Daily., Disp: , Rfl:   Past Medical History:   Diagnosis Date   • CAD in native artery    • Central sleep apnea    • Chest pain     Other   • Coronary artery disease involving native coronary artery 2017   • Fatigue    • Hip fracture (CMS/HCC)     left   • Hyperlipidemia    • Hypertension    • Hypothyroidism, adult    • Obstructive sleep apnea, adult    • Peripheral neuropathy    • Peripheral neuropathy    • SOB (shortness of breath)      Past Surgical History:   Procedure Laterality Date   • APPENDECTOMY     • BACK SURGERY  2011    Low Back surgery   • BRAIN SURGERY  2011   • BRAIN SURGERY     • CARDIAC CATHETERIZATION     • CHOLECYSTECTOMY     • CORONARY STENT PLACEMENT     • HYSTERECTOMY  2011    Total   • JOINT REPLACEMENT Right      Family History   Problem Relation Age of Onset   • Coronary artery disease Father    • Hypertension Father    • Stroke Mother    • Stroke Brother    • Lung disease Brother      Social History     Socioeconomic History   • Marital status:      Spouse name: Not on file   • Number of children: Not on file   • Years of education: Not on file   • Highest education level: Not on file   Tobacco Use   • Smoking status: Former Smoker     Last attempt to quit:      Years since quittin.6   • Smokeless tobacco: Never Used   • Tobacco comment: Quit    Substance and Sexual Activity   • Alcohol use: No   • Drug use: No   • Sexual activity: Defer         Review of Systems   Constitution: Negative for chills, decreased appetite, fatigue, fever, weakness, malaise/fatigue, night sweats, unexpected weight  change, weight gain and weight loss.   HENT: Negative for nosebleeds.    Eyes: Negative for blurred vision and visual disturbance.   Cardiovascular: Negative for chest pain, claudication, dyspnea on exertion, leg swelling, near-syncope, orthopnea, palpitations, paroxysmal nocturnal dyspnea and syncope.   Respiratory: Negative for chest tightness, cough, hemoptysis, shortness of breath, snoring and wheezing.    Endocrine: Negative for cold intolerance and heat intolerance.   Hematologic/Lymphatic: Does not bruise/bleed easily.   Skin: Negative for rash.   Musculoskeletal: Negative for back pain, falls, muscle weakness and neck pain.   Gastrointestinal: Negative for abdominal pain, change in bowel habit, constipation, diarrhea, dysphagia, heartburn, nausea and vomiting.   Genitourinary: Negative for hematuria and nocturia.   Neurological: Negative for dizziness, headaches and light-headedness.   Psychiatric/Behavioral: Negative for altered mental status.   Allergic/Immunologic: Negative for persistent infections.         ECG 12 Lead  Date/Time: 8/19/2019 1:28 PM  Performed by: Marla Holland APRN  Authorized by: Marla Holland APRN   Comparison: compared with previous ECG from 5/24/2018  Rhythm: sinus rhythm  Rate: normal  BPM: 61  Conduction: incomplete left bundle branch block  Q waves: I and aVL    T inversion: V3, V4, V5, V6, II, III and aVF  QRS axis: left    Clinical impression: abnormal EKG               Objective:     Physical Exam   Constitutional: She is oriented to person, place, and time. Vital signs are normal. She appears well-developed and well-nourished. No distress.   HENT:   Head: Normocephalic and atraumatic.   Right Ear: External ear normal.   Left Ear: External ear normal.   Nose: Nose normal.   Eyes: Conjunctivae are normal. Pupils are equal, round, and reactive to light. Right eye exhibits no discharge. Left eye exhibits no discharge.   Neck: Normal range of motion. Neck supple. No JVD  "present. Carotid bruit is not present. No tracheal deviation present. No thyromegaly present.   Cardiovascular: Normal rate, regular rhythm, normal heart sounds, intact distal pulses and normal pulses. PMI is not displaced. Exam reveals no gallop and no friction rub.   No murmur heard.  Pulses:       Radial pulses are 2+ on the right side, and 2+ on the left side.        Dorsalis pedis pulses are 2+ on the right side, and 2+ on the left side.        Posterior tibial pulses are 2+ on the right side, and 2+ on the left side.   Pulmonary/Chest: Effort normal and breath sounds normal. No respiratory distress. She has no decreased breath sounds. She has no wheezes. She has no rhonchi. She has no rales. She exhibits no tenderness.   Abdominal: Soft. She exhibits no distension. There is no tenderness.   Musculoskeletal: Normal range of motion. She exhibits no edema, tenderness or deformity.   Neurological: She is alert and oriented to person, place, and time.   Skin: Skin is warm and dry. No rash noted. She is not diaphoretic. No erythema. No pallor.   Psychiatric: She has a normal mood and affect. Her behavior is normal. Judgment and thought content normal.   Vitals reviewed.  /52   Pulse 61   Ht 154.9 cm (61\")   Wt 70.8 kg (156 lb)   BMI 29.48 kg/m²       Lab Review:   Lab Results   Component Value Date    WBC 6.8 04/20/2018    HGB 11.6 (L) 04/20/2018    HCT 35.9 (L) 04/20/2018    MCV 98.1 04/20/2018     04/20/2018     Lab Results   Component Value Date    GLUCOSE 102 04/20/2018    BUN 26 (H) 04/20/2018    CREATININE 0.9 04/20/2018    EGFRIFNONA 59 (A) 04/20/2018    BCR 24.7 11/28/2017    K 3.9 04/20/2018    CO2 28 04/20/2018    CALCIUM 9.8 04/20/2018    ALBUMIN 3.9 04/20/2018    AST 17 04/20/2018    ALT 10 04/20/2018         Assessment:          Diagnosis Plan   1. Chronic diastolic (congestive) heart failure  NYHA class I. Compensated.    2. Non-occlusive coronary artery disease  Stable. Continues on " aspirin.    3. Benign hypertension  Well controlled.    4. Hyperlipidemia, unspecified hyperlipidemia type  Managed by PCP. On statin. Reported to be well controlled.           Plan:       1. Continue medications as previously prescribed.  2. Report any worsening symptoms.  3. Report any signs of bleeding.  4. Continue heart healthy diet and regular exercise as tolerated.   5. Follow up with PCP for blood pressure and cholesterol management and routine lab work.  6. Follow up with mid-level provider in one year, or sooner if needed.

## 2019-08-22 DIAGNOSIS — K21.9 GASTROESOPHAGEAL REFLUX DISEASE WITHOUT ESOPHAGITIS: ICD-10-CM

## 2019-08-27 RX ORDER — SUCRALFATE 1 G/1
TABLET ORAL
Qty: 120 TABLET | Refills: 5 | Status: SHIPPED | OUTPATIENT
Start: 2019-08-27 | End: 2021-01-01

## 2019-09-12 ENCOUNTER — OFFICE VISIT (OUTPATIENT)
Dept: FAMILY MEDICINE CLINIC | Age: 84
End: 2019-09-12
Payer: MEDICARE

## 2019-09-12 VITALS
HEART RATE: 59 BPM | SYSTOLIC BLOOD PRESSURE: 98 MMHG | OXYGEN SATURATION: 98 % | BODY MASS INDEX: 26.16 KG/M2 | HEIGHT: 65 IN | DIASTOLIC BLOOD PRESSURE: 48 MMHG | WEIGHT: 157 LBS

## 2019-09-12 DIAGNOSIS — Z00.00 ROUTINE GENERAL MEDICAL EXAMINATION AT A HEALTH CARE FACILITY: Primary | ICD-10-CM

## 2019-09-12 DIAGNOSIS — I10 ESSENTIAL HYPERTENSION: ICD-10-CM

## 2019-09-12 PROCEDURE — G0439 PPPS, SUBSEQ VISIT: HCPCS | Performed by: FAMILY MEDICINE

## 2019-09-12 PROCEDURE — 1123F ACP DISCUSS/DSCN MKR DOCD: CPT | Performed by: FAMILY MEDICINE

## 2019-09-12 PROCEDURE — 4040F PNEUMOC VAC/ADMIN/RCVD: CPT | Performed by: FAMILY MEDICINE

## 2019-09-12 RX ORDER — LISINOPRIL AND HYDROCHLOROTHIAZIDE 12.5; 1 MG/1; MG/1
TABLET ORAL
Qty: 180 TABLET | Refills: 3
Start: 2019-09-12 | End: 2020-02-11

## 2019-09-12 RX ORDER — CHLORAL HYDRATE 500 MG
3000 CAPSULE ORAL DAILY
COMMUNITY

## 2019-09-12 ASSESSMENT — PATIENT HEALTH QUESTIONNAIRE - PHQ9
SUM OF ALL RESPONSES TO PHQ QUESTIONS 1-9: 0
SUM OF ALL RESPONSES TO PHQ QUESTIONS 1-9: 0

## 2019-09-12 ASSESSMENT — LIFESTYLE VARIABLES: HOW OFTEN DO YOU HAVE A DRINK CONTAINING ALCOHOL: 0

## 2019-09-12 NOTE — PROGRESS NOTES
Medicare Annual Wellness Visit  Name: Cecile Sanders Date: 2019   MRN: [id number] Sex: Female   Age: 80 y.o. Ethnicity: Non-/Non    : 1933 Race: Adilene Viveros is here for Medicare AWV (gets tired easily occasionlly, states it is due to her heart condition, she saw her cardiologist last month for routine check up. )  Low bp this visit, stated she took her bp med late. Bp was 104/48, Dr. Domenica Gruber gave order to change dose to one half tablet of lisinopril-hctz 10-12.5 by mouth daily for this week, then she has an appointment with him next week to discuss this. Patient voiced understanding. Screenings for behavioral, psychosocial and functional/safety risks, and cognitive dysfunction are all negative except as indicated below. These results, as well as other patient data from the [address] form, are documented in Flowsheets linked to this Encounter. Allergies   Allergen Reactions    Detrol [Tolterodine Tartrate] Swelling    Mobic [Meloxicam] Nausea Only     Medication was very fairly effective but did cause some GI distress.  Naprosyn [Naproxen]      N/v      Bactrim [Sulfamethoxazole-Trimethoprim] Nausea And Vomiting     Prior to Visit Medications    Medication Sig Taking? Authorizing Provider   Omega-3 Fatty Acids (FISH OIL) 1000 MG CAPS Take 3,000 mg by mouth daily Yes Historical Provider, MD   lisinopril-hydrochlorothiazide (PRINZIDE;ZESTORETIC) 10-12.5 MG per tablet Take one half tablet by mouth daily Yes Annette Velasquez MD   sucralfate (CARAFATE) 1 GM tablet TAKE ONE TABLET BY MOUTH FOUR TIMES DAILY.  TAKE WITH MELOXICAM. Yes Annette Velasquez MD   rosuvastatin (CRESTOR) 5 MG tablet Take 1 tablet by mouth daily Yes Annette Velasquez MD   diltiazem (CARDIZEM) 90 MG tablet TAKE 1 TABLET BY MOUTH TWICE DAILY Yes Annette Velasquez MD   levothyroxine (SYNTHROID) 50 MCG tablet TAKE ONE TABLET BY MOUTH ONCE DAILY Yes Annette Velasquez MD   furosemide (LASIX) 20 MG

## 2019-09-16 ENCOUNTER — OFFICE VISIT (OUTPATIENT)
Dept: FAMILY MEDICINE CLINIC | Age: 84
End: 2019-09-16
Payer: MEDICARE

## 2019-09-16 VITALS
TEMPERATURE: 96.6 F | HEART RATE: 77 BPM | WEIGHT: 157.4 LBS | SYSTOLIC BLOOD PRESSURE: 120 MMHG | BODY MASS INDEX: 26.6 KG/M2 | OXYGEN SATURATION: 96 % | DIASTOLIC BLOOD PRESSURE: 64 MMHG

## 2019-09-16 DIAGNOSIS — M54.42 CHRONIC MIDLINE LOW BACK PAIN WITH LEFT-SIDED SCIATICA: ICD-10-CM

## 2019-09-16 DIAGNOSIS — E03.9 ACQUIRED HYPOTHYROIDISM: ICD-10-CM

## 2019-09-16 DIAGNOSIS — I10 ESSENTIAL HYPERTENSION: ICD-10-CM

## 2019-09-16 DIAGNOSIS — M15.9 PRIMARY OSTEOARTHRITIS INVOLVING MULTIPLE JOINTS: ICD-10-CM

## 2019-09-16 DIAGNOSIS — E78.1 HYPERTRIGLYCERIDEMIA: ICD-10-CM

## 2019-09-16 DIAGNOSIS — H61.22 IMPACTED CERUMEN OF LEFT EAR: ICD-10-CM

## 2019-09-16 DIAGNOSIS — E55.9 VITAMIN D DEFICIENCY: ICD-10-CM

## 2019-09-16 DIAGNOSIS — K21.9 GASTROESOPHAGEAL REFLUX DISEASE WITHOUT ESOPHAGITIS: ICD-10-CM

## 2019-09-16 DIAGNOSIS — G89.29 CHRONIC MIDLINE LOW BACK PAIN WITH LEFT-SIDED SCIATICA: ICD-10-CM

## 2019-09-16 PROCEDURE — 99214 OFFICE O/P EST MOD 30 MIN: CPT | Performed by: FAMILY MEDICINE

## 2019-09-16 PROCEDURE — 1123F ACP DISCUSS/DSCN MKR DOCD: CPT | Performed by: FAMILY MEDICINE

## 2019-09-16 PROCEDURE — 1036F TOBACCO NON-USER: CPT | Performed by: FAMILY MEDICINE

## 2019-09-16 PROCEDURE — G8427 DOCREV CUR MEDS BY ELIG CLIN: HCPCS | Performed by: FAMILY MEDICINE

## 2019-09-16 PROCEDURE — 1090F PRES/ABSN URINE INCON ASSESS: CPT | Performed by: FAMILY MEDICINE

## 2019-09-16 PROCEDURE — 4040F PNEUMOC VAC/ADMIN/RCVD: CPT | Performed by: FAMILY MEDICINE

## 2019-09-16 PROCEDURE — G8417 CALC BMI ABV UP PARAM F/U: HCPCS | Performed by: FAMILY MEDICINE

## 2019-09-16 RX ORDER — OXYCODONE AND ACETAMINOPHEN 10; 325 MG/1; MG/1
1 TABLET ORAL EVERY 8 HOURS PRN
Qty: 90 TABLET | Refills: 0 | Status: SHIPPED | OUTPATIENT
Start: 2019-09-16 | End: 2019-10-16

## 2019-09-16 RX ORDER — CETIRIZINE HYDROCHLORIDE 10 MG/1
10 TABLET ORAL DAILY
COMMUNITY

## 2019-09-16 ASSESSMENT — ENCOUNTER SYMPTOMS
DIARRHEA: 0
SHORTNESS OF BREATH: 0
VOMITING: 0
WHEEZING: 0
COUGH: 0
NAUSEA: 0
EYES NEGATIVE: 1
CHEST TIGHTNESS: 0
BACK PAIN: 1
BLOOD IN STOOL: 0
CONSTIPATION: 0

## 2019-09-16 NOTE — PROGRESS NOTES
Subjective:      Patient ID: Angelica Whiteside is a 80 y.o. female. HPI  This patient is seen here regularly by the undersigned for management of chronic disease states. She does have a history of a closed fracture of her left pubis and also suffers with chronic midline low back pain with left-sided sciatica and bilateral hip pain. She does have documented osteoarthritis affecting multiple joints. She continues to require a walker to help facilitate ambulation. She continues to benefit from [facility] 10/325 utilized a every 8 hours for her chronic pain issues. She remains on meloxicam 15 mg by mouth daily with food as well.       Additionally, she does remain saddened by the recent passing of her  of many years, Darrell Hemphill, who was identified with a hematologic malignancy recently. Mrs. Nelson Lopes states that she is getting along somewhat better presently.     She does have essential hypertension. She is on lisinopril/HCTZ 10/12.5 at 1 p.o. twice daily. . Additionally she takes Cardizem 90 mg by mouth twice a day and Lasix 20 mg by mouth daily. Meds are tolerated well and pressure today was good at 120/64. Because her blood pressure is so low her last visit, I did have her cut back on lisinopril/HCTZ to just 1/2  in the morning. She additionally had complained of having significant nocturia and this perhaps would help that as well. Additionally, I did put her on medication to try to help with that, TOLTERODINE is Detrol. . She states that after taking just the first pill it began to cause her face to swell. Additionally, she states that the medicine was about $700. She does not feel like she can take it. Did label her chart as well as allergy to that medicine    She does have a history of acquired hypothyroidism. She continues to take Synthroid 50 µg by mouth daily which should remains tolerated well and has enabled normalization of thyroid function studies.     She has a history of GERD.  She continues to Exam   Constitutional: She is oriented to person, place, and time. Vital signs are normal. She appears well-developed and well-nourished. She is active. HENT:   Head: Normocephalic and atraumatic. Right Ear: External ear normal.   Left Ear: External ear normal.   Nose: Nose normal.   Mouth/Throat: Oropharynx is clear and moist.   Eyes: Pupils are equal, round, and reactive to light. Conjunctivae and EOM are normal.   Neck: Normal range of motion and full passive range of motion without pain. Neck supple. No thyromegaly present. Cardiovascular: Normal rate, regular rhythm, S1 normal, S2 normal, normal heart sounds and normal pulses. Exam reveals no gallop and no friction rub. No murmur heard. Pulmonary/Chest: Effort normal and breath sounds normal. No stridor. No respiratory distress. She has no wheezes. She has no rales. Abdominal: Soft. Normal appearance and bowel sounds are normal. She exhibits no distension and no mass. There is no tenderness. There is no rebound and no guarding. Musculoskeletal: Normal range of motion. She exhibits no edema, tenderness or deformity. Neurological: She is alert and oriented to person, place, and time. She has normal strength and normal reflexes. Skin: Skin is warm, dry and intact. Psychiatric: She has a normal mood and affect. Her speech is normal and behavior is normal. Judgment and thought content normal. Cognition and memory are normal.   Nursing note and vitals reviewed. /64 (Site: Right Upper Arm, Position: Sitting, Cuff Size: Medium Adult)   Pulse 77   Temp 96.6 °F (35.9 °C) (Temporal)   Wt 157 lb 6.4 oz (71.4 kg)   SpO2 96%   BMI 26.60 kg/m²   Assessment:         Diagnosis Orders   1. Primary osteoarthritis involving multiple joints  oxyCODONE-acetaminophen (PERCOCET)  MG per tablet   2. Essential hypertension     3. Gastroesophageal reflux disease without esophagitis     4. Acquired hypothyroidism     5.  Chronic midline low back

## 2019-10-24 ENCOUNTER — OFFICE VISIT (OUTPATIENT)
Dept: FAMILY MEDICINE CLINIC | Age: 84
End: 2019-10-24
Payer: MEDICARE

## 2019-10-24 VITALS
DIASTOLIC BLOOD PRESSURE: 50 MMHG | WEIGHT: 161.6 LBS | RESPIRATION RATE: 16 BRPM | OXYGEN SATURATION: 96 % | HEIGHT: 65 IN | TEMPERATURE: 96.8 F | BODY MASS INDEX: 26.92 KG/M2 | SYSTOLIC BLOOD PRESSURE: 110 MMHG | HEART RATE: 87 BPM

## 2019-10-24 DIAGNOSIS — G89.29 CHRONIC MIDLINE LOW BACK PAIN WITH LEFT-SIDED SCIATICA: ICD-10-CM

## 2019-10-24 DIAGNOSIS — E03.9 ACQUIRED HYPOTHYROIDISM: ICD-10-CM

## 2019-10-24 DIAGNOSIS — K21.9 GASTROESOPHAGEAL REFLUX DISEASE WITHOUT ESOPHAGITIS: ICD-10-CM

## 2019-10-24 DIAGNOSIS — E78.1 HYPERTRIGLYCERIDEMIA: ICD-10-CM

## 2019-10-24 DIAGNOSIS — R53.82 CHRONIC FATIGUE: ICD-10-CM

## 2019-10-24 DIAGNOSIS — M15.9 PRIMARY OSTEOARTHRITIS INVOLVING MULTIPLE JOINTS: ICD-10-CM

## 2019-10-24 DIAGNOSIS — E55.9 VITAMIN D DEFICIENCY: ICD-10-CM

## 2019-10-24 DIAGNOSIS — M54.42 CHRONIC MIDLINE LOW BACK PAIN WITH LEFT-SIDED SCIATICA: ICD-10-CM

## 2019-10-24 DIAGNOSIS — I10 ESSENTIAL HYPERTENSION: ICD-10-CM

## 2019-10-24 PROCEDURE — 99214 OFFICE O/P EST MOD 30 MIN: CPT | Performed by: FAMILY MEDICINE

## 2019-10-24 PROCEDURE — G8482 FLU IMMUNIZE ORDER/ADMIN: HCPCS | Performed by: FAMILY MEDICINE

## 2019-10-24 PROCEDURE — G8417 CALC BMI ABV UP PARAM F/U: HCPCS | Performed by: FAMILY MEDICINE

## 2019-10-24 PROCEDURE — G8428 CUR MEDS NOT DOCUMENT: HCPCS | Performed by: FAMILY MEDICINE

## 2019-10-24 PROCEDURE — 4040F PNEUMOC VAC/ADMIN/RCVD: CPT | Performed by: FAMILY MEDICINE

## 2019-10-24 PROCEDURE — 1036F TOBACCO NON-USER: CPT | Performed by: FAMILY MEDICINE

## 2019-10-24 PROCEDURE — 90653 IIV ADJUVANT VACCINE IM: CPT | Performed by: FAMILY MEDICINE

## 2019-10-24 PROCEDURE — 1090F PRES/ABSN URINE INCON ASSESS: CPT | Performed by: FAMILY MEDICINE

## 2019-10-24 PROCEDURE — G0008 ADMIN INFLUENZA VIRUS VAC: HCPCS | Performed by: FAMILY MEDICINE

## 2019-10-24 PROCEDURE — 1123F ACP DISCUSS/DSCN MKR DOCD: CPT | Performed by: FAMILY MEDICINE

## 2019-10-24 RX ORDER — OXYCODONE AND ACETAMINOPHEN 10; 325 MG/1; MG/1
1 TABLET ORAL EVERY 6 HOURS PRN
COMMUNITY
End: 2019-10-24 | Stop reason: SDUPTHER

## 2019-10-24 RX ORDER — OXYCODONE AND ACETAMINOPHEN 10; 325 MG/1; MG/1
1 TABLET ORAL EVERY 6 HOURS PRN
Qty: 120 TABLET | Refills: 0 | Status: SHIPPED | OUTPATIENT
Start: 2019-10-24 | End: 2019-12-12 | Stop reason: SDUPTHER

## 2019-10-24 ASSESSMENT — ENCOUNTER SYMPTOMS
VOMITING: 0
DIARRHEA: 0
BACK PAIN: 1
COUGH: 0
SHORTNESS OF BREATH: 0
EYES NEGATIVE: 1
NAUSEA: 0
WHEEZING: 0
BLOOD IN STOOL: 0
CHEST TIGHTNESS: 0
CONSTIPATION: 0

## 2019-11-15 DIAGNOSIS — E03.9 HYPOTHYROIDISM: ICD-10-CM

## 2019-11-18 RX ORDER — LEVOTHYROXINE SODIUM 0.05 MG/1
TABLET ORAL
Qty: 90 TABLET | Refills: 3 | Status: SHIPPED | OUTPATIENT
Start: 2019-11-18 | End: 2020-01-01

## 2019-11-18 RX ORDER — FUROSEMIDE 20 MG/1
TABLET ORAL
Qty: 60 TABLET | Refills: 5 | Status: SHIPPED | OUTPATIENT
Start: 2019-11-18

## 2019-12-12 ENCOUNTER — OFFICE VISIT (OUTPATIENT)
Dept: FAMILY MEDICINE CLINIC | Age: 84
End: 2019-12-12
Payer: MEDICARE

## 2019-12-12 VITALS
BODY MASS INDEX: 26.2 KG/M2 | TEMPERATURE: 96.4 F | RESPIRATION RATE: 16 BRPM | SYSTOLIC BLOOD PRESSURE: 128 MMHG | OXYGEN SATURATION: 97 % | HEIGHT: 66 IN | HEART RATE: 80 BPM | WEIGHT: 163 LBS | DIASTOLIC BLOOD PRESSURE: 52 MMHG

## 2019-12-12 DIAGNOSIS — E03.9 ACQUIRED HYPOTHYROIDISM: ICD-10-CM

## 2019-12-12 DIAGNOSIS — I10 ESSENTIAL HYPERTENSION: ICD-10-CM

## 2019-12-12 DIAGNOSIS — E78.5 HYPERLIPIDEMIA, UNSPECIFIED HYPERLIPIDEMIA TYPE: ICD-10-CM

## 2019-12-12 DIAGNOSIS — G89.29 CHRONIC MIDLINE LOW BACK PAIN WITH LEFT-SIDED SCIATICA: ICD-10-CM

## 2019-12-12 DIAGNOSIS — E78.1 HYPERTRIGLYCERIDEMIA: ICD-10-CM

## 2019-12-12 DIAGNOSIS — M15.9 PRIMARY OSTEOARTHRITIS INVOLVING MULTIPLE JOINTS: ICD-10-CM

## 2019-12-12 DIAGNOSIS — M54.42 CHRONIC MIDLINE LOW BACK PAIN WITH LEFT-SIDED SCIATICA: ICD-10-CM

## 2019-12-12 DIAGNOSIS — E55.9 VITAMIN D DEFICIENCY: ICD-10-CM

## 2019-12-12 DIAGNOSIS — K21.9 GASTROESOPHAGEAL REFLUX DISEASE WITHOUT ESOPHAGITIS: ICD-10-CM

## 2019-12-12 PROCEDURE — 1123F ACP DISCUSS/DSCN MKR DOCD: CPT | Performed by: FAMILY MEDICINE

## 2019-12-12 PROCEDURE — 1090F PRES/ABSN URINE INCON ASSESS: CPT | Performed by: FAMILY MEDICINE

## 2019-12-12 PROCEDURE — 4040F PNEUMOC VAC/ADMIN/RCVD: CPT | Performed by: FAMILY MEDICINE

## 2019-12-12 PROCEDURE — G8417 CALC BMI ABV UP PARAM F/U: HCPCS | Performed by: FAMILY MEDICINE

## 2019-12-12 PROCEDURE — G8482 FLU IMMUNIZE ORDER/ADMIN: HCPCS | Performed by: FAMILY MEDICINE

## 2019-12-12 PROCEDURE — G8427 DOCREV CUR MEDS BY ELIG CLIN: HCPCS | Performed by: FAMILY MEDICINE

## 2019-12-12 PROCEDURE — 99214 OFFICE O/P EST MOD 30 MIN: CPT | Performed by: FAMILY MEDICINE

## 2019-12-12 PROCEDURE — 1036F TOBACCO NON-USER: CPT | Performed by: FAMILY MEDICINE

## 2019-12-12 RX ORDER — OXYCODONE AND ACETAMINOPHEN 10; 325 MG/1; MG/1
1 TABLET ORAL EVERY 6 HOURS PRN
COMMUNITY
End: 2020-01-16 | Stop reason: SDUPTHER

## 2019-12-12 RX ORDER — OXYCODONE AND ACETAMINOPHEN 10; 325 MG/1; MG/1
1 TABLET ORAL EVERY 6 HOURS PRN
Qty: 120 TABLET | Refills: 0 | Status: SHIPPED | OUTPATIENT
Start: 2019-12-12 | End: 2020-01-11

## 2019-12-12 RX ORDER — NAPROXEN 500 MG/1
500 TABLET ORAL 2 TIMES DAILY WITH MEALS
Qty: 60 TABLET | Refills: 3 | Status: SHIPPED | OUTPATIENT
Start: 2019-12-12 | End: 2020-04-06 | Stop reason: ALTCHOICE

## 2019-12-12 ASSESSMENT — ENCOUNTER SYMPTOMS
WHEEZING: 0
CONSTIPATION: 0
SHORTNESS OF BREATH: 0
CHEST TIGHTNESS: 0
COUGH: 0
EYES NEGATIVE: 1
DIARRHEA: 0
NAUSEA: 0
BACK PAIN: 1
VOMITING: 0
BLOOD IN STOOL: 0

## 2020-01-01 ENCOUNTER — VIRTUAL VISIT (OUTPATIENT)
Dept: FAMILY MEDICINE CLINIC | Age: 85
End: 2020-01-01
Payer: MEDICARE

## 2020-01-01 ENCOUNTER — OFFICE VISIT (OUTPATIENT)
Dept: FAMILY MEDICINE CLINIC | Age: 85
End: 2020-01-01
Payer: MEDICARE

## 2020-01-01 ENCOUNTER — TELEPHONE (OUTPATIENT)
Dept: FAMILY MEDICINE CLINIC | Age: 85
End: 2020-01-01

## 2020-01-01 VITALS
DIASTOLIC BLOOD PRESSURE: 60 MMHG | HEIGHT: 64 IN | HEART RATE: 62 BPM | WEIGHT: 172 LBS | RESPIRATION RATE: 16 BRPM | BODY MASS INDEX: 29.37 KG/M2 | OXYGEN SATURATION: 98 % | SYSTOLIC BLOOD PRESSURE: 132 MMHG | TEMPERATURE: 96.6 F

## 2020-01-01 VITALS
DIASTOLIC BLOOD PRESSURE: 58 MMHG | TEMPERATURE: 97.2 F | WEIGHT: 177.4 LBS | HEART RATE: 84 BPM | OXYGEN SATURATION: 95 % | SYSTOLIC BLOOD PRESSURE: 134 MMHG | BODY MASS INDEX: 29.56 KG/M2 | RESPIRATION RATE: 16 BRPM | HEIGHT: 65 IN

## 2020-01-01 VITALS — HEIGHT: 64 IN | WEIGHT: 169 LBS | BODY MASS INDEX: 28.85 KG/M2 | TEMPERATURE: 97.8 F

## 2020-01-01 DIAGNOSIS — R60.9 EDEMA, UNSPECIFIED TYPE: ICD-10-CM

## 2020-01-01 LAB
ANION GAP SERPL CALCULATED.3IONS-SCNC: 13 MMOL/L (ref 7–19)
BUN BLDV-MCNC: 38 MG/DL (ref 8–23)
CALCIUM SERPL-MCNC: 9.9 MG/DL (ref 8.8–10.2)
CHLORIDE BLD-SCNC: 96 MMOL/L (ref 98–111)
CO2: 28 MMOL/L (ref 22–29)
CREAT SERPL-MCNC: 1.1 MG/DL (ref 0.5–0.9)
GFR AFRICAN AMERICAN: 57
GFR NON-AFRICAN AMERICAN: 47
GLUCOSE BLD-MCNC: 140 MG/DL (ref 74–109)
HCT VFR BLD CALC: 36 % (ref 37–47)
HEMOGLOBIN: 11.6 G/DL (ref 12–16)
MCH RBC QN AUTO: 31.8 PG (ref 27–31)
MCHC RBC AUTO-ENTMCNC: 32.2 G/DL (ref 33–37)
MCV RBC AUTO: 98.6 FL (ref 81–99)
PDW BLD-RTO: 12.3 % (ref 11.5–14.5)
PLATELET # BLD: 263 K/UL (ref 130–400)
PMV BLD AUTO: 10.3 FL (ref 9.4–12.3)
POTASSIUM SERPL-SCNC: 4.4 MMOL/L (ref 3.5–5)
RBC # BLD: 3.65 M/UL (ref 4.2–5.4)
SODIUM BLD-SCNC: 137 MMOL/L (ref 136–145)
WBC # BLD: 8.4 K/UL (ref 4.8–10.8)

## 2020-01-01 PROCEDURE — 4040F PNEUMOC VAC/ADMIN/RCVD: CPT | Performed by: FAMILY MEDICINE

## 2020-01-01 PROCEDURE — 99214 OFFICE O/P EST MOD 30 MIN: CPT | Performed by: FAMILY MEDICINE

## 2020-01-01 PROCEDURE — 1123F ACP DISCUSS/DSCN MKR DOCD: CPT | Performed by: FAMILY MEDICINE

## 2020-01-01 PROCEDURE — 1090F PRES/ABSN URINE INCON ASSESS: CPT | Performed by: FAMILY MEDICINE

## 2020-01-01 PROCEDURE — G0439 PPPS, SUBSEQ VISIT: HCPCS | Performed by: FAMILY MEDICINE

## 2020-01-01 PROCEDURE — 99442 PR PHYS/QHP TELEPHONE EVALUATION 11-20 MIN: CPT | Performed by: FAMILY MEDICINE

## 2020-01-01 PROCEDURE — G8427 DOCREV CUR MEDS BY ELIG CLIN: HCPCS | Performed by: FAMILY MEDICINE

## 2020-01-01 PROCEDURE — G8417 CALC BMI ABV UP PARAM F/U: HCPCS | Performed by: FAMILY MEDICINE

## 2020-01-01 PROCEDURE — 1036F TOBACCO NON-USER: CPT | Performed by: FAMILY MEDICINE

## 2020-01-01 RX ORDER — ROSUVASTATIN CALCIUM 5 MG/1
5 TABLET, COATED ORAL DAILY
Qty: 90 TABLET | Refills: 3 | Status: SHIPPED | OUTPATIENT
Start: 2020-01-01

## 2020-01-01 RX ORDER — OXYCODONE AND ACETAMINOPHEN 10; 325 MG/1; MG/1
1 TABLET ORAL EVERY 6 HOURS PRN
Qty: 120 TABLET | Refills: 0 | Status: SHIPPED | OUTPATIENT
Start: 2020-01-01 | End: 2020-01-01 | Stop reason: SDUPTHER

## 2020-01-01 RX ORDER — DILTIAZEM HCL 90 MG
TABLET ORAL
Qty: 180 TABLET | Refills: 0 | Status: SHIPPED | OUTPATIENT
Start: 2020-01-01 | End: 2021-01-01

## 2020-01-01 RX ORDER — DILTIAZEM HCL 90 MG
TABLET ORAL
Qty: 180 TABLET | Refills: 0 | Status: SHIPPED | OUTPATIENT
Start: 2020-01-01 | End: 2020-01-01

## 2020-01-01 RX ORDER — LEVOTHYROXINE SODIUM 0.05 MG/1
TABLET ORAL
Qty: 90 TABLET | Refills: 0 | Status: SHIPPED | OUTPATIENT
Start: 2020-01-01 | End: 2021-01-01

## 2020-01-01 RX ORDER — LISINOPRIL AND HYDROCHLOROTHIAZIDE 12.5; 1 MG/1; MG/1
TABLET ORAL
Qty: 180 TABLET | Refills: 0 | Status: SHIPPED | OUTPATIENT
Start: 2020-01-01 | End: 2020-01-01

## 2020-01-01 RX ORDER — OXYCODONE AND ACETAMINOPHEN 10; 325 MG/1; MG/1
1 TABLET ORAL EVERY 6 HOURS PRN
Qty: 120 TABLET | Refills: 0 | Status: SHIPPED | OUTPATIENT
Start: 2020-01-01 | End: 2021-01-01 | Stop reason: SDUPTHER

## 2020-01-01 RX ORDER — LISINOPRIL AND HYDROCHLOROTHIAZIDE 12.5; 1 MG/1; MG/1
TABLET ORAL
Qty: 180 TABLET | Refills: 0 | Status: SHIPPED | OUTPATIENT
Start: 2020-01-01 | End: 2021-01-01

## 2020-01-01 RX ORDER — FUROSEMIDE 40 MG/1
40 TABLET ORAL DAILY
Qty: 90 TABLET | Refills: 3 | Status: SHIPPED | OUTPATIENT
Start: 2020-01-01 | End: 2021-01-01 | Stop reason: SDUPTHER

## 2020-01-01 ASSESSMENT — PATIENT HEALTH QUESTIONNAIRE - PHQ9
10. IF YOU CHECKED OFF ANY PROBLEMS, HOW DIFFICULT HAVE THESE PROBLEMS MADE IT FOR YOU TO DO YOUR WORK, TAKE CARE OF THINGS AT HOME, OR GET ALONG WITH OTHER PEOPLE: 0
9. THOUGHTS THAT YOU WOULD BE BETTER OFF DEAD, OR OF HURTING YOURSELF: 0
7. TROUBLE CONCENTRATING ON THINGS, SUCH AS READING THE NEWSPAPER OR WATCHING TELEVISION: 0
SUM OF ALL RESPONSES TO PHQ QUESTIONS 1-9: 6
5. POOR APPETITE OR OVEREATING: 0
SUM OF ALL RESPONSES TO PHQ QUESTIONS 1-9: 6
SUM OF ALL RESPONSES TO PHQ QUESTIONS 1-9: 6
8. MOVING OR SPEAKING SO SLOWLY THAT OTHER PEOPLE COULD HAVE NOTICED. OR THE OPPOSITE, BEING SO FIGETY OR RESTLESS THAT YOU HAVE BEEN MOVING AROUND A LOT MORE THAN USUAL: 0
6. FEELING BAD ABOUT YOURSELF - OR THAT YOU ARE A FAILURE OR HAVE LET YOURSELF OR YOUR FAMILY DOWN: 0
3. TROUBLE FALLING OR STAYING ASLEEP: 0
2. FEELING DOWN, DEPRESSED OR HOPELESS: 0
1. LITTLE INTEREST OR PLEASURE IN DOING THINGS: 3
SUM OF ALL RESPONSES TO PHQ9 QUESTIONS 1 & 2: 3
4. FEELING TIRED OR HAVING LITTLE ENERGY: 3

## 2020-01-01 ASSESSMENT — ENCOUNTER SYMPTOMS
NAUSEA: 0
EYES NEGATIVE: 1
VOMITING: 0
BLOOD IN STOOL: 0
SHORTNESS OF BREATH: 0
COUGH: 0
WHEEZING: 0
DIARRHEA: 0
VOMITING: 0
SHORTNESS OF BREATH: 0
NAUSEA: 0
COUGH: 0
BACK PAIN: 1
BACK PAIN: 1
CONSTIPATION: 0
CHEST TIGHTNESS: 0
DIARRHEA: 0
CHEST TIGHTNESS: 0
CONSTIPATION: 0
WHEEZING: 0
EYES NEGATIVE: 1

## 2020-01-01 ASSESSMENT — LIFESTYLE VARIABLES: HOW OFTEN DO YOU HAVE A DRINK CONTAINING ALCOHOL: 0

## 2020-01-16 ENCOUNTER — OFFICE VISIT (OUTPATIENT)
Dept: FAMILY MEDICINE CLINIC | Age: 85
End: 2020-01-16
Payer: MEDICARE

## 2020-01-16 VITALS
OXYGEN SATURATION: 96 % | HEART RATE: 85 BPM | TEMPERATURE: 96.1 F | WEIGHT: 165.13 LBS | BODY MASS INDEX: 27.06 KG/M2 | DIASTOLIC BLOOD PRESSURE: 58 MMHG | SYSTOLIC BLOOD PRESSURE: 118 MMHG

## 2020-01-16 LAB
AMPHETAMINE SCREEN, URINE: NORMAL
BARBITURATE SCREEN, URINE: NORMAL
BENZODIAZEPINE SCREEN, URINE: NORMAL
BUPRENORPHINE URINE: NORMAL
COCAINE METABOLITE SCREEN URINE: NORMAL
GABAPENTIN SCREEN, URINE: NORMAL
MDMA URINE: NORMAL
METHADONE SCREEN, URINE: NORMAL
METHAMPHETAMINE, URINE: NORMAL
OPIATE SCREEN URINE: NORMAL
OXYCODONE SCREEN URINE: NORMAL
PHENCYCLIDINE SCREEN URINE: NORMAL
PROPOXYPHENE SCREEN, URINE: NORMAL
THC SCREEN, URINE: NORMAL
TRICYCLIC ANTIDEPRESSANTS, UR: NORMAL

## 2020-01-16 PROCEDURE — 1036F TOBACCO NON-USER: CPT | Performed by: FAMILY MEDICINE

## 2020-01-16 PROCEDURE — 1123F ACP DISCUSS/DSCN MKR DOCD: CPT | Performed by: FAMILY MEDICINE

## 2020-01-16 PROCEDURE — 1090F PRES/ABSN URINE INCON ASSESS: CPT | Performed by: FAMILY MEDICINE

## 2020-01-16 PROCEDURE — G8417 CALC BMI ABV UP PARAM F/U: HCPCS | Performed by: FAMILY MEDICINE

## 2020-01-16 PROCEDURE — 4040F PNEUMOC VAC/ADMIN/RCVD: CPT | Performed by: FAMILY MEDICINE

## 2020-01-16 PROCEDURE — 99214 OFFICE O/P EST MOD 30 MIN: CPT | Performed by: FAMILY MEDICINE

## 2020-01-16 PROCEDURE — 80305 DRUG TEST PRSMV DIR OPT OBS: CPT | Performed by: FAMILY MEDICINE

## 2020-01-16 PROCEDURE — G8482 FLU IMMUNIZE ORDER/ADMIN: HCPCS | Performed by: FAMILY MEDICINE

## 2020-01-16 PROCEDURE — G8427 DOCREV CUR MEDS BY ELIG CLIN: HCPCS | Performed by: FAMILY MEDICINE

## 2020-01-16 RX ORDER — OXYCODONE AND ACETAMINOPHEN 10; 325 MG/1; MG/1
1 TABLET ORAL EVERY 6 HOURS PRN
Qty: 120 TABLET | Refills: 0 | Status: SHIPPED | OUTPATIENT
Start: 2020-01-16 | End: 2020-02-15

## 2020-01-16 ASSESSMENT — ENCOUNTER SYMPTOMS
CONSTIPATION: 0
SHORTNESS OF BREATH: 0
CHEST TIGHTNESS: 0
VOMITING: 0
WHEEZING: 0
DIARRHEA: 0
EYES NEGATIVE: 1
COUGH: 0
BACK PAIN: 1
BLOOD IN STOOL: 0
NAUSEA: 0

## 2020-01-16 ASSESSMENT — PATIENT HEALTH QUESTIONNAIRE - PHQ9
1. LITTLE INTEREST OR PLEASURE IN DOING THINGS: 0
SUM OF ALL RESPONSES TO PHQ9 QUESTIONS 1 & 2: 0
SUM OF ALL RESPONSES TO PHQ QUESTIONS 1-9: 0
2. FEELING DOWN, DEPRESSED OR HOPELESS: 0
SUM OF ALL RESPONSES TO PHQ QUESTIONS 1-9: 0

## 2020-01-16 NOTE — PROGRESS NOTES
Subjective:      Patient ID: Ginette Jimenez is a 80 y.o. female. HPI  This patient is seen here regularly by the undersigned for management of chronic disease states. She does have a history of a closed fracture of her left pubis and also suffers with chronic midline low back pain with left-sided sciatica and bilateral hip pain. She does have documented osteoarthritis affecting multiple joints. She continues to require a walker to help facilitate ambulation. She continues to benefit from [facility] 10/325 utilized a every 8 hours for her chronic pain issues. She no longer takes the Mobic that she had taken previously because it causes some GI distress. We did therefore give her a trial of Naprosyn but I did insisted that she try to take this with food.      Additionally, she does continue to remain saddened by the  passing of her  of many years, Aminah Bhatti, who was identified with a hematologic malignancy . Mrs. Kimo Cardoza states that she is getting along somewhat better presently.     She does have essential hypertension. She is on lisinopril/HCTZ 10/12.5 at 1 p.o. twice daily. . Additionally she takes Cardizem 90 mg by mouth twice a day and Lasix 20 mg by mouth daily. Meds are tolerated well and pressure today was good at 118/58.  Because her blood pressure is so low on  a recent visit, I did have her cut back on lisinopril/HCTZ to just 1/2  in the morning.  She remains on one/ half p.o. each morning and blood pressure remains quite acceptable.      She does have a history of acquired hypothyroidism. She continues to take Synthroid 50 µg by mouth daily which should remains tolerated well and has enabled normalization of thyroid function studies.     She has a history of GERD.  She continues to utilize Nexium 40 mg by mouth daily and Carafate 1 g before meals and at bedtime which remain helpful.     She does have a history of chronic seasonal allergies but these presently remain stable.      For hyperlipidemia, she has been on simvastatin 20 mg by mouth daily at bedtime and an 81 mg aspirin taken as well. She was told by the pharmacy that this did have some interaction with Cardizem and doses exceeding 5 mg so we have not been of the opinion that 5 mg of simvastatin would be helpful so I am changing that in stopping the Zocor and changing her to  Crestor at 5 mg by mouth daily at bedtime. The most recent lipids reported showed triglyceride remains slightly elevated at 178 and cholesterol was good at 149. Other lipids were essentially unremarkable. She recently heard that simvastatin can cause cancer. She is concerned over the cost of the Crestor today. Though she only pays $8 for it there is a considerable amount of money over that amount that goes towards her medication threshold for the year so we're going to try Lipitor 20 mg. It will be written at 1 by mouth daily and she will did have her start by  taking it one half daily to see if she tolerates it well. Review of Systems   Constitutional: Negative. HENT: Negative. Eyes: Negative. Respiratory: Negative for cough, chest tightness, shortness of breath and wheezing. Cardiovascular: Negative for chest pain, palpitations and leg swelling. Gastrointestinal: Negative for blood in stool, constipation, diarrhea, nausea and vomiting. Genitourinary: Negative for hematuria. Musculoskeletal: Positive for arthralgias, back pain and gait problem. Skin: Negative. Psychiatric/Behavioral: Negative. Objective:   Physical Exam  Vitals signs and nursing note reviewed. Constitutional:       General: She is not in acute distress. Appearance: Normal appearance. She is well-developed and well-groomed. She is not ill-appearing, toxic-appearing or diaphoretic. HENT:      Head: Normocephalic and atraumatic. Right Ear: Tympanic membrane, ear canal and external ear normal. There is no impacted cerumen.       Left Ear: Tympanic membrane, cervical: No superficial cervical adenopathy. Left cervical: No superficial cervical adenopathy. Upper Body:      Right upper body: No supraclavicular adenopathy. Left upper body: No supraclavicular adenopathy. Skin:     General: Skin is warm and dry. Nails: There is no clubbing. Neurological:      General: No focal deficit present. Mental Status: She is alert and oriented to person, place, and time. Mental status is at baseline. Motor: No weakness or tremor. Coordination: Coordination normal.      Deep Tendon Reflexes: Reflexes are normal and symmetric. Psychiatric:         Attention and Perception: Attention normal.         Mood and Affect: Mood normal.         Speech: Speech normal.         Behavior: Behavior normal. Behavior is cooperative. Thought Content: Thought content normal.         Cognition and Memory: Cognition and memory normal.         Judgment: Judgment normal.          BP (!) 118/58 (Site: Left Upper Arm, Position: Sitting, Cuff Size: Medium Adult)   Pulse 85   Temp 96.1 °F (35.6 °C) (Temporal)   Wt 165 lb 2 oz (74.9 kg)   SpO2 96%   BMI 27.06 kg/m²     Assessment:         Diagnosis Orders   1. Essential hypertension     2. Seasonal allergic rhinitis, unspecified trigger     3. Gastroesophageal reflux disease without esophagitis     4. Chronic midline low back pain with left-sided sciatica     5. Primary osteoarthritis involving multiple joints     6. Hypertriglyceridemia     7. Vitamin D deficiency     8. Weakness              Plan:      I am having Ms. Jasmyne Velasquez maintain her :   Outpatient Medications Marked as Taking for the 1/16/20 encounter (Office Visit) with Ora Toth MD   Medication Sig Dispense Refill    oxyCODONE-acetaminophen (PERCOCET)  MG per tablet Take 1 tablet by mouth every 6 hours as needed for Pain for up to 30 days.  120 tablet 0    naproxen (NAPROSYN) 500 MG tablet Take 1 tablet by mouth 2 times daily (with meals) 60 tablet 3    levothyroxine (SYNTHROID) 50 MCG tablet TAKE 1 TABLET BY MOUTH ONCE DAILY 90 tablet 3    furosemide (LASIX) 20 MG tablet TAKE 1 TABLET BY MOUTH ONCE DAILY 60 tablet 5    cetirizine (ZYRTEC) 10 MG tablet Take 10 mg by mouth daily      Omega-3 Fatty Acids (FISH OIL) 1000 MG CAPS Take 3,000 mg by mouth daily      lisinopril-hydrochlorothiazide (PRINZIDE;ZESTORETIC) 10-12.5 MG per tablet Take one half tablet by mouth daily 180 tablet 3    sucralfate (CARAFATE) 1 GM tablet TAKE ONE TABLET BY MOUTH FOUR TIMES DAILY. TAKE WITH MELOXICAM. 120 tablet 5    rosuvastatin (CRESTOR) 5 MG tablet Take 1 tablet by mouth daily 90 tablet 3    diltiazem (CARDIZEM) 90 MG tablet TAKE 1 TABLET BY MOUTH TWICE DAILY 180 tablet 3    montelukast (SINGULAIR) 10 MG tablet Take 1 tablet by mouth nightly 30 tablet 3    diclofenac 2 % SOLN Place 1 each onto the skin 4 times daily as needed (pain) 112 g 5    esomeprazole (NEXIUM) 40 MG delayed release capsule TAKE ONE CAPSULE BY MOUTH EVERY DAY 90 capsule 3    meloxicam (MOBIC) 15 MG tablet Take 1 tablet by mouth daily 30 tablet 5    aspirin 81 MG tablet Take 81 mg by mouth every other day One every other day 30 tablet     Multiple Vitamins-Minerals (PRESERVISION AREDS 2) CAPS Take  by mouth.  latanoprost (XALATAN) 0.005 % ophthalmic solution 1 drop daily.  Multiple Vitamins-Minerals (THERAPEUTIC MULTIVITAMIN-MINERALS) tablet Take 1 tablet by mouth daily.  calcium carbonate (OSCAL) 500 MG TABS tablet Take 500 mg by mouth daily.  vitamin D3 (CHOLECALCIFEROL) 1000 UNITS TABS tablet Take 1,000 Units by mouth daily.      ,  Medications Discontinued During This Encounter   Medication Reason    oxyCODONE-acetaminophen (PERCOCET)  MG per tablet REORDER     Requested Prescriptions     Signed Prescriptions Disp Refills    oxyCODONE-acetaminophen (PERCOCET)  MG per tablet 120 tablet 0     Sig: Take 1 tablet by mouth every 6 hours as

## 2020-02-11 RX ORDER — LISINOPRIL AND HYDROCHLOROTHIAZIDE 12.5; 1 MG/1; MG/1
TABLET ORAL
Qty: 180 TABLET | Refills: 0 | Status: SHIPPED | OUTPATIENT
Start: 2020-02-11 | End: 2020-02-13

## 2020-02-13 RX ORDER — LISINOPRIL AND HYDROCHLOROTHIAZIDE 12.5; 1 MG/1; MG/1
TABLET ORAL
Qty: 180 TABLET | Refills: 0 | Status: SHIPPED | OUTPATIENT
Start: 2020-02-13 | End: 2020-01-01

## 2020-02-27 ENCOUNTER — OFFICE VISIT (OUTPATIENT)
Dept: FAMILY MEDICINE CLINIC | Age: 85
End: 2020-02-27
Payer: MEDICARE

## 2020-02-27 VITALS
RESPIRATION RATE: 16 BRPM | OXYGEN SATURATION: 97 % | DIASTOLIC BLOOD PRESSURE: 56 MMHG | BODY MASS INDEX: 28.32 KG/M2 | HEIGHT: 65 IN | SYSTOLIC BLOOD PRESSURE: 122 MMHG | WEIGHT: 170 LBS | TEMPERATURE: 98.2 F | HEART RATE: 87 BPM

## 2020-02-27 PROCEDURE — 1123F ACP DISCUSS/DSCN MKR DOCD: CPT | Performed by: FAMILY MEDICINE

## 2020-02-27 PROCEDURE — 1090F PRES/ABSN URINE INCON ASSESS: CPT | Performed by: FAMILY MEDICINE

## 2020-02-27 PROCEDURE — 99214 OFFICE O/P EST MOD 30 MIN: CPT | Performed by: FAMILY MEDICINE

## 2020-02-27 PROCEDURE — 4040F PNEUMOC VAC/ADMIN/RCVD: CPT | Performed by: FAMILY MEDICINE

## 2020-02-27 PROCEDURE — G8417 CALC BMI ABV UP PARAM F/U: HCPCS | Performed by: FAMILY MEDICINE

## 2020-02-27 PROCEDURE — G8482 FLU IMMUNIZE ORDER/ADMIN: HCPCS | Performed by: FAMILY MEDICINE

## 2020-02-27 PROCEDURE — G8428 CUR MEDS NOT DOCUMENT: HCPCS | Performed by: FAMILY MEDICINE

## 2020-02-27 PROCEDURE — 1036F TOBACCO NON-USER: CPT | Performed by: FAMILY MEDICINE

## 2020-02-27 RX ORDER — OXYCODONE AND ACETAMINOPHEN 10; 325 MG/1; MG/1
1 TABLET ORAL EVERY 6 HOURS PRN
Qty: 120 TABLET | Refills: 0 | Status: SHIPPED | OUTPATIENT
Start: 2020-02-27 | End: 2020-04-06 | Stop reason: SDUPTHER

## 2020-02-27 RX ORDER — OXYCODONE AND ACETAMINOPHEN 10; 325 MG/1; MG/1
1 TABLET ORAL EVERY 6 HOURS PRN
COMMUNITY
End: 2020-02-27 | Stop reason: SDUPTHER

## 2020-02-27 ASSESSMENT — ENCOUNTER SYMPTOMS
CONSTIPATION: 0
NAUSEA: 0
EYES NEGATIVE: 1
BACK PAIN: 1
DIARRHEA: 0
COUGH: 0
WHEEZING: 0
VOMITING: 0
CHEST TIGHTNESS: 0
BLOOD IN STOOL: 0
SHORTNESS OF BREATH: 0

## 2020-02-27 NOTE — PROGRESS NOTES
tolerated well and pressure today was good at 122/56.  Because her blood pressure is so low on  a recent visit, I have had  Her to  cut back on lisinopril/HCTZ to just 1/2  in the morning.  She remains on one/ half p.o. each morning and blood pressure remains quite acceptable.      She does have a history of acquired hypothyroidism. She continues to take Synthroid 50 µg by mouth daily which should remains tolerated well and has enabled normalization of thyroid function studies.     She has a history of GERD. She continues to utilize Nexium 40 mg by mouth daily and Carafate 1 g before meals and at bedtime which remain helpful.     She does have a history of chronic seasonal allergies but these presently remain stable.      For hyperlipidemia, she has been on simvastatin 20 mg by mouth daily at bedtime and an 81 mg aspirin taken as well. She was told by the pharmacy that this did have some interaction with Cardizem and doses exceeding 5 mg so we have not been of the opinion that 5 mg of simvastatin would be helpful so I am changing that in stopping the Zocor and changing her to  Crestor at 5 mg by mouth daily at bedtime. The most recent lipids reported showed triglyceride remains slightly elevated at 178 and cholesterol was good at 149. Other lipids were essentially unremarkable. She recently heard that simvastatin can cause cancer. She is concerned over the cost of the Crestor today. Though she only pays $8 for it there is a considerable amount of money over that amount that goes towards her medication threshold for the year so we're going to try Lipitor 20 mg. It will be written at 1 by mouth daily and she will did have her start by  taking it one half daily to see if she tolerates it well. Review of Systems   Constitutional: Negative. HENT: Negative. Eyes: Negative. Respiratory: Negative for cough, chest tightness, shortness of breath and wheezing.     Cardiovascular: Negative for chest pain, palpitations and leg swelling. Gastrointestinal: Negative for blood in stool, constipation, diarrhea, nausea and vomiting. Genitourinary: Negative for hematuria. Musculoskeletal: Positive for arthralgias and back pain. The patient does utilize a walker to facilitate safe ambulation. Skin: Negative. Neurological: Positive for weakness. Psychiatric/Behavioral: Negative. Objective:   Physical Exam  Vitals signs and nursing note reviewed. Constitutional:       General: She is not in acute distress. Appearance: Normal appearance. She is well-developed and well-groomed. She is not ill-appearing, toxic-appearing or diaphoretic. HENT:      Head: Normocephalic and atraumatic. Right Ear: Tympanic membrane, ear canal and external ear normal. There is no impacted cerumen. Left Ear: Tympanic membrane, ear canal and external ear normal. There is no impacted cerumen. Nose: Nose normal.      Mouth/Throat:      Lips: Pink. Mouth: Mucous membranes are moist.      Dentition: Normal dentition. Pharynx: Oropharynx is clear. Uvula midline. Eyes:      General: Lids are normal.         Right eye: No discharge. Left eye: No discharge. Extraocular Movements: Extraocular movements intact. Conjunctiva/sclera: Conjunctivae normal.      Right eye: Right conjunctiva is not injected. Left eye: Left conjunctiva is not injected. Pupils: Pupils are equal, round, and reactive to light. Neck:      Musculoskeletal: Full passive range of motion without pain, normal range of motion and neck supple. No neck rigidity or muscular tenderness. Thyroid: No thyromegaly. Vascular: No carotid bruit or JVD. Cardiovascular:      Rate and Rhythm: Normal rate and regular rhythm. Pulses: Normal pulses. Carotid pulses are 2+ on the right side and 2+ on the left side. Radial pulses are 2+ on the right side and 2+ on the left side.       Heart sounds: Normal heart sounds, S1 normal and S2 normal. No murmur. No friction rub. No gallop. Pulmonary:      Effort: Pulmonary effort is normal. No respiratory distress. Breath sounds: Normal breath sounds. No stridor. No wheezing, rhonchi or rales. Chest:      Chest wall: No tenderness. Abdominal:      General: Bowel sounds are normal. There is no distension or abdominal bruit. Palpations: Abdomen is soft. There is no mass. Tenderness: There is no abdominal tenderness. There is no right CVA tenderness, left CVA tenderness, guarding or rebound. Hernia: No hernia is present. Musculoskeletal: Normal range of motion. General: No swelling, tenderness, deformity or signs of injury. Right lower leg: No edema. Left lower leg: No edema. Lymphadenopathy:      Cervical: No cervical adenopathy. Right cervical: No superficial cervical adenopathy. Left cervical: No superficial cervical adenopathy. Upper Body:      Right upper body: No supraclavicular adenopathy. Left upper body: No supraclavicular adenopathy. Skin:     General: Skin is warm and dry. Findings: Lesion present. Nails: There is no clubbing. Comments: Patient has lesion right lateral periorbital area. This is approximately 1 cm in vertical height by 6 mm in horizontal width. Has excoriated central aspect much like a squamous cell carcinoma may have but also has rolled edges peripherally which could be BCE. Called and has appointment with Dr. Elda Venegas who usually does her skin evaluations for her. Appointment is forthcoming on 3/14/2020. Neurological:      General: No focal deficit present. Mental Status: She is alert and oriented to person, place, and time. Mental status is at baseline. Motor: Weakness present. No tremor. Coordination: Coordination abnormal.      Gait: Gait abnormal.      Deep Tendon Reflexes: Reflexes are normal and symmetric.    Psychiatric:

## 2020-04-06 ENCOUNTER — VIRTUAL VISIT (OUTPATIENT)
Dept: FAMILY MEDICINE CLINIC | Age: 85
End: 2020-04-06
Payer: MEDICARE

## 2020-04-06 PROCEDURE — 99442 PR PHYS/QHP TELEPHONE EVALUATION 11-20 MIN: CPT | Performed by: FAMILY MEDICINE

## 2020-04-06 RX ORDER — MELOXICAM 15 MG/1
15 TABLET ORAL DAILY
Qty: 30 TABLET | Refills: 5 | Status: SHIPPED | OUTPATIENT
Start: 2020-04-06 | End: 2020-01-01

## 2020-04-06 RX ORDER — OXYCODONE AND ACETAMINOPHEN 10; 325 MG/1; MG/1
1 TABLET ORAL EVERY 6 HOURS PRN
Qty: 120 TABLET | Refills: 0 | Status: SHIPPED | OUTPATIENT
Start: 2020-04-06 | End: 2020-05-06

## 2020-04-06 NOTE — PROGRESS NOTES
Adrienne Wallace is a 80 y.o. female evaluated via telephone on 4/6/2020. Patient is being consulted today by telehealth while she is at her home. This is being done because of the recent pandemic in [place] secondary to the coronavirus. Patient has been advised that there would be a charged rendered to her insurance by [facility] ([facility]). 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  She currently is maintained on her cassette 10/325 utilized up to every 6 hour intervals for her pain. She is no longer taking Naprosyn since it caused her to have significant nausea. She has tolerated Mobic 15 mg p.o. daily for the that in the past so we are going to refill the Mobic for her today. She does have Carafate available at 1 g before meals and at bedtime and she was instructed to use 1 of those each time she takes her meloxicam also. She previously was taking Nexium but has been able to discontinue that since she had Carafate. She does have a history of essential hypertension. She is on lisinopril/HCTZ at 10/12.5 taken twice daily. She states that she does monitor her pressure and her pressures are doing well. .  Additionally, she also takes Lasix at 20 mg p.o. daily. Meds are tolerated well. Diltiazem 90 is taken twice daily. She does have a history of acquired hypothyroidism. She is on levothyroxine 50 mcg taken once daily. She tolerates this well and it has been hopeful with regulation of thyroid disorder. For hyperlipidemia, she is on Crestor 5 mg p.o. nightly. Additionally she takes an 81 mg aspirin every other day. She does have glaucoma. She is maintained on Xalatan 0.005% drop at 1 daily OU. She does take vitamin D supplementation with over-the-counter cholecalciferol 1000 unit tablet daily.     Presently, she denies any chest pain, shortness of breath, pedal edema, or other new issues as of this time. Medicines are being sent to [address] per her request.   Diagnosis Orders   1. Chronic midline low back pain with left-sided sciatica  oxyCODONE-acetaminophen (PERCOCET)  MG per tablet   2. Essential hypertension     3. Gastroesophageal reflux disease without esophagitis     4. Primary osteoarthritis involving multiple joints  meloxicam (MOBIC) 15 MG tablet   5. Hypertriglyceridemia     6. Vitamin D deficiency       I am having Ms. Walter Orozco maintain her :   Outpatient Medications Marked as Taking for the 4/6/20 encounter (Virtual Visit) with Gaurav Gu MD   Medication Sig Dispense Refill    oxyCODONE-acetaminophen (PERCOCET)  MG per tablet Take 1 tablet by mouth every 6 hours as needed for Pain for up to 30 days. 120 tablet 0    meloxicam (MOBIC) 15 MG tablet Take 1 tablet by mouth daily 30 tablet 5       Medications Discontinued During This Encounter   Medication Reason    naproxen (NAPROSYN) 500 MG tablet Alternate therapy    oxyCODONE-acetaminophen (PERCOCET)  MG per tablet REORDER    meloxicam (MOBIC) 15 MG tablet REORDER     Requested Prescriptions     Signed Prescriptions Disp Refills    oxyCODONE-acetaminophen (PERCOCET)  MG per tablet 120 tablet 0     Sig: Take 1 tablet by mouth every 6 hours as needed for Pain for up to 30 days.  meloxicam (MOBIC) 15 MG tablet 30 tablet 5     Sig: Take 1 tablet by mouth daily   . Consent:  She and/or health care decision maker is aware that that she may receive a bill for this telephone service, depending on her insurance coverage, and has provided verbal consent to proceed: Yes      Documentation:  I communicated with the patient and/or health care decision maker about .    Details of this discussion including any medical advice provided:       I affirm this is a Patient Initiated Episode with an Established Patient who has not had a related appointment within my department in the past 7 days or scheduled within the next 24 hours.     Total Time: 11-20 minutes    Note: not billable if this call serves to triage the patient into an appointment for the relevant concern      Vinh Travis

## 2020-05-15 RX ORDER — DILTIAZEM HCL 90 MG
TABLET ORAL
Qty: 180 TABLET | Refills: 0 | Status: SHIPPED | OUTPATIENT
Start: 2020-05-15 | End: 2020-01-01

## 2020-05-18 ENCOUNTER — VIRTUAL VISIT (OUTPATIENT)
Dept: FAMILY MEDICINE CLINIC | Age: 85
End: 2020-05-18
Payer: MEDICARE

## 2020-05-18 PROCEDURE — 99442 PR PHYS/QHP TELEPHONE EVALUATION 11-20 MIN: CPT | Performed by: FAMILY MEDICINE

## 2020-05-18 RX ORDER — OXYCODONE AND ACETAMINOPHEN 10; 325 MG/1; MG/1
1 TABLET ORAL EVERY 6 HOURS PRN
Qty: 120 TABLET | Refills: 0 | Status: SHIPPED | OUTPATIENT
Start: 2020-05-18 | End: 2020-01-01 | Stop reason: SDUPTHER

## 2020-05-18 NOTE — PROGRESS NOTES
Sana Lu is a 80 y.o. female evaluated via telephone on 5/18/2020. This consultation today is being done by way of telehealth phone conference call. The patient is being consulted by telehealth due to the current pandemic in [place] caused by coronavirus. Patient has been advised that there is a bill to her insurance provider for this telehealth service. This patient is seen here intermittently by the undersigned for management of chronic disease states. 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  She currently is maintained on her cassette 10/325 utilized up to every 6 hour intervals for her pain. She is no longer taking Naprosyn since it caused her to have significant nausea. She has tolerated Mobic 15 mg p.o. daily for the that in the past so we are going to refill the Mobic for her today. She does have Carafate available at 1 g before meals and at bedtime and she was instructed to use 1 of those each time she takes her meloxicam also. She previously was taking Nexium but has been able to discontinue that since she had Carafate.     She does have a history of essential hypertension. She is on lisinopril/HCTZ at 10/12.5 taken twice daily. She states that she does monitor her pressure and her pressures are doing well. .  Additionally, she also takes Lasix at 20 mg p.o. daily. Meds are tolerated well. Diltiazem 90 is taken twice daily.     She does have a history of acquired hypothyroidism. She is on levothyroxine 50 mcg taken once daily. She tolerates this well and it has been hopeful with regulation of thyroid disorder.     For hyperlipidemia, she is on Crestor 5 mg p.o. nightly. Additionally she takes an 81 mg aspirin every other day.     She does have glaucoma.   She is maintained on Xalatan 0.005% drop at 1 daily OU.     She does take vitamin D supplementation with over-the-counter cholecalciferol 1000 unit tablet daily.     Presently, she denies any chest pain, shortness of breath, pedal edema, or other new issues as of this time. Medicines are being sent to [facility] per her request.   Diagnosis Orders   1. Chronic midline low back pain with left-sided sciatica  oxyCODONE-acetaminophen (PERCOCET)  MG per tablet   2. Essential hypertension     3. Gastroesophageal reflux disease without esophagitis     4. Acquired hypothyroidism     5. Primary osteoarthritis involving multiple joints     6. Hypertriglyceridemia     7. Vitamin D deficiency       I am having Ms. Debra Mcdermott maintain her :   Outpatient Medications Marked as Taking for the 5/18/20 encounter (Virtual Visit) with Gali Wise MD   Medication Sig Dispense Refill    oxyCODONE-acetaminophen (PERCOCET)  MG per tablet Take 1 tablet by mouth every 6 hours as needed for Pain for up to 30 days. 120 tablet 0     Requested Prescriptions     Signed Prescriptions Disp Refills    oxyCODONE-acetaminophen (PERCOCET)  MG per tablet 120 tablet 0     Sig: Take 1 tablet by mouth every 6 hours as needed for Pain for up to 30 days. .     Consent:  She and/or health care decision maker is aware that that she may receive a bill for this telephone service, depending on her insurance coverage, and has provided verbal consent to proceed: Yes      Documentation:  I communicated with the patient and/or health care decision maker about . Details of this discussion including any medical advice provided:       I affirm this is a Patient Initiated Episode with a Patient who has not had a related appointment within my department in the past 7 days or scheduled within the next 24 hours.     Patient identification was verified at the start of the visit: Yes    Total Time: minutes: 11-20 minutes    Note: not billable if this call serves to triage the patient into an appointment for the relevant concern      Florencia Dejesus

## 2020-05-28 ENCOUNTER — OFFICE VISIT (OUTPATIENT)
Dept: CARDIOLOGY | Facility: CLINIC | Age: 85
End: 2020-05-28

## 2020-05-28 VITALS
HEIGHT: 61 IN | WEIGHT: 175 LBS | OXYGEN SATURATION: 98 % | BODY MASS INDEX: 33.04 KG/M2 | DIASTOLIC BLOOD PRESSURE: 60 MMHG | SYSTOLIC BLOOD PRESSURE: 135 MMHG | HEART RATE: 63 BPM

## 2020-05-28 DIAGNOSIS — I50.32 CHRONIC DIASTOLIC CONGESTIVE HEART FAILURE (HCC): ICD-10-CM

## 2020-05-28 DIAGNOSIS — I10 BENIGN HYPERTENSION: ICD-10-CM

## 2020-05-28 DIAGNOSIS — E78.5 HYPERLIPIDEMIA, UNSPECIFIED HYPERLIPIDEMIA TYPE: ICD-10-CM

## 2020-05-28 DIAGNOSIS — M79.89 LEG SWELLING: Primary | ICD-10-CM

## 2020-05-28 DIAGNOSIS — I25.10 NON-OCCLUSIVE CORONARY ARTERY DISEASE: ICD-10-CM

## 2020-05-28 PROCEDURE — 99214 OFFICE O/P EST MOD 30 MIN: CPT | Performed by: NURSE PRACTITIONER

## 2020-05-28 RX ORDER — MULTIPLE VITAMINS W/ MINERALS TAB 9MG-400MCG
1 TAB ORAL DAILY
COMMUNITY

## 2020-05-28 RX ORDER — SUCRALFATE 1 G/1
1 TABLET ORAL 4 TIMES DAILY
COMMUNITY

## 2020-06-09 PROBLEM — M79.89 LEG SWELLING: Status: ACTIVE | Noted: 2020-06-09

## 2020-06-09 PROCEDURE — 93000 ELECTROCARDIOGRAM COMPLETE: CPT | Performed by: NURSE PRACTITIONER

## 2020-06-12 ENCOUNTER — HOSPITAL ENCOUNTER (OUTPATIENT)
Dept: CARDIOLOGY | Facility: HOSPITAL | Age: 85
Discharge: HOME | End: 2020-06-12
Admitting: NURSE PRACTITIONER

## 2020-06-12 VITALS
HEIGHT: 61 IN | BODY MASS INDEX: 33.05 KG/M2 | DIASTOLIC BLOOD PRESSURE: 60 MMHG | SYSTOLIC BLOOD PRESSURE: 135 MMHG | WEIGHT: 175.04 LBS

## 2020-06-12 DIAGNOSIS — M79.89 LEG SWELLING: ICD-10-CM

## 2020-06-12 DIAGNOSIS — I50.32 CHRONIC DIASTOLIC CONGESTIVE HEART FAILURE (HCC): ICD-10-CM

## 2020-06-12 DIAGNOSIS — I10 BENIGN HYPERTENSION: ICD-10-CM

## 2020-06-12 PROCEDURE — 93306 TTE W/DOPPLER COMPLETE: CPT

## 2020-06-12 PROCEDURE — 93306 TTE W/DOPPLER COMPLETE: CPT | Performed by: INTERNAL MEDICINE

## 2020-06-15 LAB
BH CV ECHO MEAS - AO MAX PG (FULL): 1.7 MMHG
BH CV ECHO MEAS - AO MAX PG: 6.2 MMHG
BH CV ECHO MEAS - AO MEAN PG (FULL): 1 MMHG
BH CV ECHO MEAS - AO MEAN PG: 3 MMHG
BH CV ECHO MEAS - AO ROOT AREA (BSA CORRECTED): 1.7
BH CV ECHO MEAS - AO ROOT AREA: 7.1 CM^2
BH CV ECHO MEAS - AO ROOT DIAM: 3 CM
BH CV ECHO MEAS - AO V2 MAX: 124 CM/SEC
BH CV ECHO MEAS - AO V2 MEAN: 78.1 CM/SEC
BH CV ECHO MEAS - AO V2 VTI: 24.6 CM
BH CV ECHO MEAS - AVA(I,A): 3.1 CM^2
BH CV ECHO MEAS - AVA(I,D): 3.1 CM^2
BH CV ECHO MEAS - AVA(V,A): 2.7 CM^2
BH CV ECHO MEAS - AVA(V,D): 2.7 CM^2
BH CV ECHO MEAS - BSA(HAYCOCK): 1.9 M^2
BH CV ECHO MEAS - BSA: 1.8 M^2
BH CV ECHO MEAS - BZI_BMI: 33.1 KILOGRAMS/M^2
BH CV ECHO MEAS - BZI_METRIC_HEIGHT: 154.9 CM
BH CV ECHO MEAS - BZI_METRIC_WEIGHT: 79.4 KG
BH CV ECHO MEAS - EDV(CUBED): 78.4 ML
BH CV ECHO MEAS - EDV(MOD-SP4): 67.5 ML
BH CV ECHO MEAS - EDV(TEICH): 82.2 ML
BH CV ECHO MEAS - EF(CUBED): 75.2 %
BH CV ECHO MEAS - EF(MOD-SP4): 75.1 %
BH CV ECHO MEAS - EF(TEICH): 67.4 %
BH CV ECHO MEAS - ESV(CUBED): 19.5 ML
BH CV ECHO MEAS - ESV(MOD-SP4): 16.8 ML
BH CV ECHO MEAS - ESV(TEICH): 26.8 ML
BH CV ECHO MEAS - FS: 37.1 %
BH CV ECHO MEAS - IVS/LVPW: 1.4
BH CV ECHO MEAS - IVSD: 1.4 CM
BH CV ECHO MEAS - LA DIMENSION: 3.2 CM
BH CV ECHO MEAS - LA/AO: 1.1
BH CV ECHO MEAS - LAT PEAK E' VEL: 13.1 CM/SEC
BH CV ECHO MEAS - LV DIASTOLIC VOL/BSA (35-75): 37.8 ML/M^2
BH CV ECHO MEAS - LV MASS(C)D: 175 GRAMS
BH CV ECHO MEAS - LV MASS(C)DI: 98 GRAMS/M^2
BH CV ECHO MEAS - LV MAX PG: 4.5 MMHG
BH CV ECHO MEAS - LV MEAN PG: 2 MMHG
BH CV ECHO MEAS - LV SYSTOLIC VOL/BSA (12-30): 9.4 ML/M^2
BH CV ECHO MEAS - LV V1 MAX: 106 CM/SEC
BH CV ECHO MEAS - LV V1 MEAN: 66.6 CM/SEC
BH CV ECHO MEAS - LV V1 VTI: 24.1 CM
BH CV ECHO MEAS - LVIDD: 4.3 CM
BH CV ECHO MEAS - LVIDS: 2.7 CM
BH CV ECHO MEAS - LVLD AP4: 6.7 CM
BH CV ECHO MEAS - LVLS AP4: 5.1 CM
BH CV ECHO MEAS - LVOT AREA (M): 3.1 CM^2
BH CV ECHO MEAS - LVOT AREA: 3.1 CM^2
BH CV ECHO MEAS - LVOT DIAM: 2 CM
BH CV ECHO MEAS - LVPWD: 0.95 CM
BH CV ECHO MEAS - MED PEAK E' VEL: 16.2 CM/SEC
BH CV ECHO MEAS - MV A MAX VEL: 136 CM/SEC
BH CV ECHO MEAS - MV DEC TIME: 0.37 SEC
BH CV ECHO MEAS - MV E MAX VEL: 101 CM/SEC
BH CV ECHO MEAS - MV E/A: 0.74
BH CV ECHO MEAS - RAP SYSTOLE: 5 MMHG
BH CV ECHO MEAS - RVSP: 28 MMHG
BH CV ECHO MEAS - SI(AO): 97.4 ML/M^2
BH CV ECHO MEAS - SI(CUBED): 33 ML/M^2
BH CV ECHO MEAS - SI(LVOT): 42.4 ML/M^2
BH CV ECHO MEAS - SI(MOD-SP4): 28.4 ML/M^2
BH CV ECHO MEAS - SI(TEICH): 31 ML/M^2
BH CV ECHO MEAS - SV(AO): 173.9 ML
BH CV ECHO MEAS - SV(CUBED): 58.9 ML
BH CV ECHO MEAS - SV(LVOT): 75.7 ML
BH CV ECHO MEAS - SV(MOD-SP4): 50.7 ML
BH CV ECHO MEAS - SV(TEICH): 55.4 ML
BH CV ECHO MEAS - TR MAX VEL: 240 CM/SEC
BH CV ECHO MEASUREMENTS AVERAGE E/E' RATIO: 6.89
LEFT ATRIUM VOLUME INDEX: 30.3 ML/M2
LEFT ATRIUM VOLUME: 53.9 CM3
MAXIMAL PREDICTED HEART RATE: 133 BPM
STRESS TARGET HR: 113 BPM

## 2020-06-29 ENCOUNTER — OFFICE VISIT (OUTPATIENT)
Dept: CARDIOLOGY | Facility: CLINIC | Age: 85
End: 2020-06-29

## 2020-06-29 VITALS
DIASTOLIC BLOOD PRESSURE: 48 MMHG | BODY MASS INDEX: 33.79 KG/M2 | HEIGHT: 61 IN | HEART RATE: 44 BPM | WEIGHT: 179 LBS | SYSTOLIC BLOOD PRESSURE: 90 MMHG | OXYGEN SATURATION: 98 %

## 2020-06-29 DIAGNOSIS — E78.2 MIXED HYPERLIPIDEMIA: ICD-10-CM

## 2020-06-29 DIAGNOSIS — M79.89 LEG SWELLING: ICD-10-CM

## 2020-06-29 DIAGNOSIS — I10 BENIGN HYPERTENSION: ICD-10-CM

## 2020-06-29 DIAGNOSIS — I25.10 NON-OCCLUSIVE CORONARY ARTERY DISEASE: ICD-10-CM

## 2020-06-29 DIAGNOSIS — I50.32 CHRONIC DIASTOLIC CONGESTIVE HEART FAILURE (HCC): Primary | ICD-10-CM

## 2020-06-29 PROCEDURE — 99213 OFFICE O/P EST LOW 20 MIN: CPT | Performed by: NURSE PRACTITIONER

## 2020-06-29 RX ORDER — LISINOPRIL AND HYDROCHLOROTHIAZIDE 12.5; 1 MG/1; MG/1
1 TABLET ORAL DAILY
COMMUNITY

## 2020-06-29 NOTE — PATIENT INSTRUCTIONS
Heart Failure, Diagnosis    Heart failure is a condition in which the heart has trouble pumping blood because it has become weak or stiff. This means that the heart does not pump blood well enough for the body to stay healthy. For some people with heart failure, fluid may back up into the lungs. There may also be swelling (edema) in the lower legs. Heart failure is usually a long-term (chronic) condition. It is important for you to take good care of yourself and follow the treatment plan from your health care provider.  What are the causes?  This condition may be caused by:  · High blood pressure (hypertension). Hypertension causes the heart muscle to work harder than normal. This makes the heart stiff or weak.  · Coronary artery disease, or CAD. CAD is the buildup of cholesterol and fat (plaque) in the arteries of the heart.  · Heart attack, also called myocardial infarction. This injures the heart muscle, making it hard for the heart to pump blood.  · Abnormal heart valves. The valves do not open and close properly, forcing the heart to pump harder to keep the blood flowing.  · Heart muscle disease (cardiomyopathy or myocarditis). This is damage to the heart muscle. It can increase the risk of heart failure.  · Lung disease. The heart works harder when the lungs are not healthy.  · Abnormal heart rhythms. These can lead to heart failure.  What increases the risk?  The risk of heart failure increases as a person ages. This condition is also more likely to develop in people who:  · Are overweight.  · Are male.  · Smoke or chew tobacco.  · Abuse alcohol or illegal drugs.  · Have taken medicines that can damage the heart, such as chemotherapy drugs.  · Have diabetes.  · Have abnormal heart rhythms.  · Have thyroid problems.  · Have low blood counts (anemia).  What are the signs or symptoms?  Symptoms of this condition include:  · Shortness of breath with activity, such as when climbing stairs.  · A cough that does not  go away.  · Swelling of the feet, ankles, legs, or abdomen.  · Losing weight for no reason.  · Trouble breathing when lying flat (orthopnea).  · Waking from sleep because of the need to sit up and get more air.  · Rapid heartbeat.  · Tiredness (fatigue) and loss of energy.  · Feeling light-headed, dizzy, or close to fainting.  · Loss of appetite.  · Nausea.  · Waking up more often during the night to urinate (nocturia).  · Confusion.  How is this diagnosed?  This condition is diagnosed based on:  · Your medical history, symptoms, and a physical exam.  · Diagnostic tests, which may include:  ? Echocardiogram.  ? Electrocardiogram (ECG).  ? Chest X-ray.  ? Blood tests.  ? Exercise stress test.  ? Radionuclide scans.  ? Cardiac catheterization and angiogram.  How is this treated?  Treatment for this condition is aimed at managing the symptoms of heart failure.  Medicines  Treatment may include medicines that:  · Help lower blood pressure by relaxing (dilating) the blood vessels. These medicines are called ACE inhibitors (angiotensin-converting enzyme) and ARBs (angiotensin receptor blockers).  · Cause the kidneys to remove salt and water from the blood through urination (diuretics).  · Improve heart muscle strength and prevent the heart from beating too fast (beta blockers).  · Increase the force of the heartbeat (digoxin).  Healthy behavior changes         Treatment may also include making healthy lifestyle changes, such as:  · Reaching and staying at a healthy weight.  · Quitting smoking or chewing tobacco.  · Eating heart-healthy foods.  · Limiting or avoiding alcohol.  · Stopping the use of illegal drugs.  · Being physically active.    Other treatments  Other treatments may include:  · Procedures to open blocked arteries or repair damaged valves.  · Placing a pacemaker to improve heart function (cardiac resynchronization therapy).  · Placing a device to treat serious abnormal heart rhythms (implantable cardioverter  defibrillator, or ICD).  · Placing a device to improve the pumping ability of the heart (left ventricular assist device, or LVAD).  · Receiving a healthy heart from a donor (heart transplant). This is done when other treatments have not helped.  Follow these instructions at home:  · Manage other health conditions as told by your health care provider. These may include hypertension, diabetes, thyroid disease, or abnormal heart rhythms.  · Get ongoing education and support as needed. Learn as much as you can about heart failure.  · Keep all follow-up visits as told by your health care provider. This is important.  Summary  · Heart failure is a condition in which the heart has trouble pumping blood because it has become weak or stiff.  · This condition is caused by high blood pressure and other diseases of the heart and lungs.  · Symptoms of this condition include shortness of breath, tiredness (fatigue), nausea, and swelling of the feet, ankles, legs, or abdomen.  · Treatments for this condition may include medicines, lifestyle changes, and surgery.  · Manage other health conditions as told by your health care provider.  This information is not intended to replace advice given to you by your health care provider. Make sure you discuss any questions you have with your health care provider.  Document Released: 12/18/2006 Document Revised: 03/06/2020 Document Reviewed: 03/06/2020  Elsevier Patient Education © 2020 [employer]  How to Use Compression Stockings  Compression stockings are elastic socks that squeeze the legs. They help increase blood flow (circulation) to the legs, decrease swelling in the legs, and reduce the chance of developing blood clots in the lower legs. Compression stockings are often used by people who:  · Are recovering from surgery.  · Have poor circulation in their legs.  · Tend to get blood clots in their legs.  · Have bulging (varicose) veins.  · Sit or stay in bed for long periods of  time.  Follow instructions from your health care provider about how and when to wear your compression stockings.  How to wear compression stockings  Before you put on your compression stockings:  · Make sure that they are the correct size and degree of compression. If you do not know your size or required grade of compression, ask your health care provider and follow the 's instructions that come with the stockings.  · Make sure that they are clean, dry, and in good condition.  · Check them for rips and tears. Do not put them on if they are ripped or torn.  Put your stockings on first thing in the morning, before you get out of bed. Keep them on for as long as your health care provider advises. When you are wearing your stockings:  · Keep them as smooth as possible. Do not allow them to bunch up. It is especially important to prevent the stockings from bunching up around your toes or behind your knees.  · Do not roll the stockings downward and leave them rolled down. This can decrease blood flow to your leg.  · Change them right away if they become wet or dirty.  When you take off your stockings, inspect your legs and feet. Check for:  · Open sores.  · Red spots.  · Swelling.  General tips  · Do not stop wearing compression stockings without talking to your health care provider first.  · Wash your stockings every day with mild detergent in cold or warm water. Do not use bleach. Air-dry your stockings or dry them in a clothes dryer on low heat. It may be helpful to have two pairs so that you have a pair to wear while the other is being washed.  · Replace your stockings every 3-6 months.  · If skin moisturizing is part of your treatment plan, apply lotion or cream at night so that your skin will be dry when you put on the stockings in the morning. It is harder to put the stockings on when you have lotion on your legs or feet.  · Wear nonskid shoes or slip-resistant socks when walking while wearing  compression stockings.  Contact a health care provider and remove your stockings if you have:  · A feeling of pins and needles in your feet or legs.  · Open sores, red spots, or other skin changes on your feet or legs.  · Swelling or pain that gets worse.  Get help right away if you have:  · Numbness or tingling in your lower legs that does not get better right after you take the stockings off.  · Toes or feet that are unusually cold or turn a bluish color.  · A warm or red area on your leg.  · New swelling or soreness in your leg.  · Shortness of breath.  · Chest pain.  · A fast or irregular heartbeat.  · Light-headedness.  · Dizziness.  Summary  · Compression stockings are elastic socks that squeeze the legs.  · They help increase blood flow (circulation) to the legs, decrease swelling in the legs, and reduce the chance of developing blood clots in the lower legs.  · Follow instructions from your health care provider about how and when to wear your compression stockings.  · Do not stop wearing your compression stockings without talking to your health care provider first.  This information is not intended to replace advice given to you by your health care provider. Make sure you discuss any questions you have with your health care provider.  Document Released: 10/15/2010 Document Revised: 12/20/2018 Document Reviewed: 12/20/2018  Elsevier Patient Education © 2020 [employer]

## 2020-06-29 NOTE — PROGRESS NOTES
Subjective:     Encounter Date:06/29/2020      Patient ID: Columba Venegas is a 87 y.o. female with a history of nonocclusive coronary artery disease, chronic diastolic heart failure, hyperlipidemia, and hypertension.    Chief Complaint: Echo results  Congestive Heart Failure   Presents for follow-up visit. Associated symptoms include edema, fatigue and shortness of breath. Pertinent negatives include no chest pain, near-syncope, orthopnea, palpitations, paroxysmal nocturnal dyspnea or unexpected weight change. The symptoms have been stable. Compliance with total regimen is 26-50%.   Hypertension   This is a chronic problem. The current episode started more than 1 year ago. The problem is controlled. Associated symptoms include malaise/fatigue, peripheral edema and shortness of breath. Pertinent negatives include no chest pain, orthopnea, palpitations or PND.   Leg Swelling   This is a chronic problem. The current episode started more than 1 month ago. The problem occurs daily. The problem has been gradually improving. Associated symptoms include fatigue. Pertinent negatives include no chest pain, coughing, rash or weakness.     Patient presents today for a follow up regarding her echo results. She presented to our office 1 month ago with complaints of leg swelling, fatigue and shortness of breath. A 2D echo was ordered and notes a normal EF with presence of grade 1 diastolic dysfunction. She states her shortness of breath continues to wax and wane. She notes her swelling resolves over night and progressively worsens throughout the day. She denies chest pain, palpitations, orthopnea and PND.      The following portions of the patient's history were reviewed and updated as appropriate: allergies, current medications, past family history, past medical history, past social history, past surgical history and problem list.    Allergies   Allergen Reactions   • Naproxen        Current Outpatient Medications:   •   aspirin 81 MG chewable tablet, Chew 81 mg Daily., Disp: , Rfl:   •  cetirizine (zyrTEC) 5 MG tablet, Take 5 mg by mouth Daily., Disp: , Rfl:   •  diltiaZEM SR (CARDIZEM SR) 90 MG 12 hr capsule, Take 90 mg by mouth 2 (Two) Times a Day., Disp: , Rfl:   •  docusate sodium 100 MG capsule, Take 100 mg by mouth 2 (Two) Times a Day. (Patient taking differently: Take 100 mg by mouth As Needed.), Disp: , Rfl:   •  furosemide (LASIX) 20 MG tablet, Take 20 mg by mouth Daily., Disp: , Rfl:   •  levothyroxine (SYNTHROID, LEVOTHROID) 50 MCG tablet, Take 50 mcg by mouth Daily., Disp: , Rfl:   •  lisinopril-hydrochlorothiazide (PRINZIDE,ZESTORETIC) 10-12.5 MG per tablet, Take 1 tablet by mouth Daily., Disp: , Rfl:   •  Multiple Vitamins-Minerals (MULTIVITAMIN WITH MINERALS) tablet tablet, Take 1 tablet by mouth Daily. Centrum silver, Disp: , Rfl:   •  Multiple Vitamins-Minerals (PRESERVISION AREDS 2 PO), Take  by mouth 2 (two) times a day., Disp: , Rfl:   •  Omega-3 Fatty Acids (FISH OIL) 1000 MG capsule capsule, Take 2,000 mg by mouth Daily With Breakfast., Disp: , Rfl:   •  oxyCODONE-acetaminophen (PERCOCET) 5-325 MG per tablet, Take 1 tablet by mouth Every 6 (Six) Hours As Needed for Severe Pain . (Patient taking differently: Take 1 tablet by mouth 2 (two) times a day.), Disp: 20 tablet, Rfl: 0  •  polyethylene glycol (MIRALAX) pack packet, Take 17 g by mouth Daily. (Patient taking differently: Take 17 g by mouth Daily As Needed.), Disp: 7 each, Rfl:   •  rosuvastatin (CRESTOR) 5 MG tablet, Take 5 mg by mouth Daily., Disp: , Rfl:   •  sucralfate (CARAFATE) 1 g tablet, Take 1 g by mouth 4 (Four) Times a Day., Disp: , Rfl:   Past Medical History:   Diagnosis Date   • CAD in native artery    • Central sleep apnea    • Chest pain     Other   • Coronary artery disease involving native coronary artery 4/19/2017   • Fatigue    • Hip fracture (CMS/HCC)     left   • Hyperlipidemia    • Hypertension    • Hypothyroidism, adult    •  "Obstructive sleep apnea, adult    • Peripheral neuropathy    • Peripheral neuropathy    • SOB (shortness of breath)        Social History     Socioeconomic History   • Marital status:      Spouse name: Not on file   • Number of children: Not on file   • Years of education: Not on file   • Highest education level: Not on file   Tobacco Use   • Smoking status: Former Smoker     Last attempt to quit:      Years since quittin.5   • Smokeless tobacco: Never Used   • Tobacco comment: Quit    Substance and Sexual Activity   • Alcohol use: No   • Drug use: No   • Sexual activity: Defer       Review of Systems   Constitution: Positive for fatigue and malaise/fatigue. Negative for unexpected weight change, weight gain and weight loss.   Cardiovascular: Positive for leg swelling. Negative for chest pain, dyspnea on exertion, irregular heartbeat, near-syncope, orthopnea, palpitations, paroxysmal nocturnal dyspnea and syncope.   Respiratory: Positive for shortness of breath. Negative for cough, sleep disturbances due to breathing, sputum production and wheezing.    Skin: Negative for dry skin, flushing, itching and rash.   Gastrointestinal: Negative for hematemesis and hematochezia.   Neurological: Negative for dizziness, light-headedness, loss of balance and weakness.   All other systems reviewed and are negative.      Procedures  BP 90/48   Pulse (!) 44   Ht 154.9 cm (61\")   Wt 81.2 kg (179 lb)   SpO2 98%   BMI 33.82 kg/m²        Objective:     Physical Exam   Constitutional: She is oriented to person, place, and time. She appears well-developed and well-nourished. No distress.   HENT:   Head: Normocephalic.   Mouth/Throat: No oropharyngeal exudate.   Eyes: Pupils are equal, round, and reactive to light. Conjunctivae and EOM are normal. No scleral icterus.   Neck: Normal range of motion. Neck supple.   Cardiovascular: Normal rate, regular rhythm, normal heart sounds and intact distal pulses.   No " murmur heard.  Pulmonary/Chest: Effort normal and breath sounds normal. No respiratory distress. She has no wheezes. She has no rales. She exhibits no tenderness.   Abdominal: Soft. Bowel sounds are normal. She exhibits no distension. There is no tenderness.   Musculoskeletal: Normal range of motion. She exhibits no edema.   Neurological: She is alert and oriented to person, place, and time. She has normal reflexes.   Skin: Skin is warm and dry. No rash noted. She is not diaphoretic. No erythema. No pallor.   Psychiatric: She has a normal mood and affect. Her behavior is normal.   Vitals reviewed.      Lab Review:   I have reviewed previous office notes, and most recent 2D echo from May  Interpretation Summary     · Left ventricular systolic function is normal. Estimated EF appears to be in the range of 61 - 65%.  · Left ventricular wall thickness is consistent with mild concentric hypertrophy.  · Left ventricular diastolic dysfunction (grade I) consistent with impaired relaxation.  · No hemodynamically significant valvular abnormalities identified on the study.           Assessment:          Diagnosis Plan   1. Chronic diastolic (congestive) heart failure     2. Benign hypertension     3. Non-occlusive coronary artery disease     4. Mixed hyperlipidemia     5. Leg swelling            Plan:       1. DHF- euvolemic. Continue 20mg lasix daily. Reviewed signs and symptoms of CHF and what to report with the patient. Patient instructed to restrict sodium and weigh daily. Report weight gain of greater than 2 lbs overnight or 5 lbs in 1 week. Pt verbalized understanding of instructions and plan of care.   2. HTN- controlled. Borderline hypotensive. Asymptomatic.   3. Non-occlusive CAD- stable. No clinical signs of ischemia.   4. HLD- reports controlled. On statin. Followed by PCP.   5. Leg swelling- likely related to venous insuffiencey as patient appears euvolemic from a HF standpoint with absence of weight gain, COBURN, and  jody. Educated on low salt diet, compression stockings and leg elevation.     Follow up in 3 months or sooner if symptoms worsen.

## 2020-07-01 NOTE — PROGRESS NOTES
134/58.     She does have a history of acquired hypothyroidism.  She is on levothyroxine 50 mcg taken once daily.  She tolerates this well and it has been hopeful with regulation of thyroid disorder.     For hyperlipidemia, she is on Crestor 5 mg p.o. nightly.  Additionally she takes an 81 mg aspirin every other day.     She does have glaucoma.  She is maintained on Xalatan 0.005% drop at 1 daily OU.     She does take vitamin D supplementation with over-the-counter cholecalciferol 1000 unit tablet daily.     Presently, she denies any chest pain, shortness of breath, pedal edema, or other new issues as of this time.  Medicines are being sent to [address] per her request.  Review of Systems   Constitutional: Negative. HENT: Negative. Eyes: Negative. Respiratory: Negative for cough, chest tightness, shortness of breath and wheezing. Cardiovascular: Negative for chest pain, palpitations and leg swelling. Gastrointestinal: Negative for constipation, diarrhea, nausea and vomiting. Genitourinary: Negative for hematuria. Musculoskeletal: Positive for arthralgias and back pain. Skin: Negative. Neurological: Negative. Psychiatric/Behavioral: Negative. Objective:   Physical Exam  Vitals signs and nursing note reviewed. Constitutional:       General: She is not in acute distress. Appearance: Normal appearance. She is well-developed and well-groomed. She is obese. She is not ill-appearing, toxic-appearing or diaphoretic. HENT:      Head: Normocephalic and atraumatic. Right Ear: Tympanic membrane, ear canal and external ear normal. There is no impacted cerumen. Left Ear: Tympanic membrane, ear canal and external ear normal. There is no impacted cerumen. Nose: Nose normal.      Mouth/Throat:      Lips: Pink. Mouth: Mucous membranes are moist.      Dentition: Normal dentition. Pharynx: Oropharynx is clear. Uvula midline.    Eyes:      General: Lids are normal. Right eye: No discharge. Left eye: No discharge. Extraocular Movements: Extraocular movements intact. Conjunctiva/sclera: Conjunctivae normal.      Right eye: Right conjunctiva is not injected. Left eye: Left conjunctiva is not injected. Pupils: Pupils are equal, round, and reactive to light. Neck:      Musculoskeletal: Full passive range of motion without pain, normal range of motion and neck supple. No neck rigidity or muscular tenderness. Thyroid: No thyromegaly. Vascular: No carotid bruit or JVD. Cardiovascular:      Rate and Rhythm: Normal rate and regular rhythm. Pulses: Normal pulses. Carotid pulses are 2+ on the right side and 2+ on the left side. Radial pulses are 2+ on the right side and 2+ on the left side. Heart sounds: Normal heart sounds, S1 normal and S2 normal. No murmur. No friction rub. No gallop. Pulmonary:      Effort: Pulmonary effort is normal. No respiratory distress. Breath sounds: Normal breath sounds. No stridor. No wheezing, rhonchi or rales. Chest:      Chest wall: No tenderness. Abdominal:      General: Bowel sounds are normal. There is no distension or abdominal bruit. Palpations: Abdomen is soft. There is no mass. Tenderness: There is no abdominal tenderness. There is no right CVA tenderness, left CVA tenderness, guarding or rebound. Hernia: No hernia is present. Musculoskeletal: Normal range of motion. General: No swelling, tenderness, deformity or signs of injury. Right lower leg: No edema. Left lower leg: No edema. Lymphadenopathy:      Cervical: No cervical adenopathy. Right cervical: No superficial cervical adenopathy. Left cervical: No superficial cervical adenopathy. Upper Body:      Right upper body: No supraclavicular adenopathy. Left upper body: No supraclavicular adenopathy. Skin:     General: Skin is warm and dry. Nails:  There is no clubbing. Neurological:      General: No focal deficit present. Mental Status: She is alert and oriented to person, place, and time. Mental status is at baseline. Motor: No weakness or tremor. Coordination: Coordination normal.      Gait: Gait normal.      Deep Tendon Reflexes: Reflexes are normal and symmetric. Psychiatric:         Attention and Perception: Attention normal.         Mood and Affect: Mood normal.         Speech: Speech normal.         Behavior: Behavior normal. Behavior is cooperative. Thought Content: Thought content normal.         Cognition and Memory: Cognition and memory normal.         Judgment: Judgment normal.          BP (!) 134/58 (Site: Left Upper Arm, Position: Sitting, Cuff Size: Medium Adult)   Pulse 84   Temp 97.2 °F (36.2 °C) (Infrared)   Resp 16   Ht 5' 4.5\" (1.638 m)   Wt 177 lb 6.4 oz (80.5 kg)   SpO2 95%   BMI 29.98 kg/m²     Assessment:         Diagnosis Orders   1. Essential hypertension     2. Edema, unspecified type  furosemide (LASIX) 40 MG tablet    + 2 to +3 pitting pedal edema bilateral lower extremities. 3. Gastroesophageal reflux disease without esophagitis     4. Acquired hypothyroidism     5. Chronic midline low back pain with left-sided sciatica  oxyCODONE-acetaminophen (PERCOCET)  MG per tablet   6. Primary osteoarthritis involving multiple joints     7. Hypertriglyceridemia     8. Vitamin D deficiency     9. Chronic fatigue             Plan:       I am having Ms. Pinky Freitas maintain her :   Outpatient Medications Marked as Taking for the 7/1/20 encounter (Office Visit) with Janice Pires MD   Medication Sig Dispense Refill    oxyCODONE-acetaminophen (PERCOCET)  MG per tablet Take 1 tablet by mouth every 6 hours as needed for Pain for up to 30 days.  120 tablet 0    furosemide (LASIX) 40 MG tablet Take 1 tablet by mouth daily 90 tablet 3    rosuvastatin (CRESTOR) 5 MG tablet Take 1 tablet by mouth tablet     Sig: Take 1 tablet by mouth every 6 hours as needed for Pain for up to 30 days.      Dispense:  120 tablet     Refill:  0     Reduce doses taken as pain becomes manageable    furosemide (LASIX) 40 MG tablet     Sig: Take 1 tablet by mouth daily     Dispense:  90 tablet     Refill:  3             Lucy Diego MD

## 2020-08-04 NOTE — PROGRESS NOTES
Subjective:      Patient ID: Snehal Hercules is a 80 y.o. female. HPI  This patient is seen here regularly by the undersigned for management of chronic disease states. Patiently, we do see her as new problems present.     She does have a history of a closed fracture of her left pubis and also suffers with chronic midline low back pain with left-sided sciatica and bilateral hip pain. She does have documented osteoarthritis affecting multiple joints. She continues to require a walker to help facilitate ambulation. She continues to benefit from [facility] 10/325 utilized a every 8 hours for her chronic pain issues.  She no longer takes the Mobic that she had taken previously because it causes some GI distress.  We did therefore give her a trial of Naprosyn but I did insisted that she try to take this with food.      Additionally, she does continue to remain saddened by the  passing of her  of many years, Burton Renteria, who was identified with a hematologic malignancy . Mrs. Dale states that she is getting along somewhat better presently.    Jackelyn Mcclellan does have a history of essential hypertension. Jackelyn Mcclellan is on lisinopril/HCTZ at 10/12.5 taken twice daily.  She states that she does monitor her pressure and her pressures are doing well. .  Additionally, she also takes Lasix at 20 mg p.o. daily.  Meds are tolerated well.  Diltiazem 90 is taken twice daily. He did complain today of some notable swelling that she has had. She did recently complete echocardiographic study by the heart group at [facility].  We did obtain a copy of this and review it with her. Ejection fraction was stated as 61 to 65%. Normal ventricular function was appreciated. There is just a small amount of concentric hypertrophy. The patient was noted at her last visit on 7/1/2020 to have +2 to +3 pitting pedal edema.   Today the patient is noted to have complete clearing of the peripheral edema and she did bring her daily weight sheet which shows that weight around the time of starting this program was 180 and now it is 170, a loss of 10 pounds. I did encourage her to increase her 20 mg Lasix up to 2 of those at on a daily basis total of 40 mg each day. She is continuing to use Lasix at 40 mg p.o. daily now. She does eat bananas on a regular basis. Nevertheless, I am going to check CBC and BMP here today to make sure she is not having any potassium depletion. Blood pressure today was good at 132/70.     She does have a history of acquired hypothyroidism. She continues to take Synthroid 50 µg by mouth daily which should remains tolerated well and has enabled normalization of thyroid function studies.     She has a history of GERD. She continues to utilize Nexium 40 mg by mouth daily and Carafate 1 g before meals and at bedtime which remain helpful.     She does have a history of chronic seasonal allergies but these presently remain stable.      For hyperlipidemia, she has been on simvastatin 20 mg by mouth daily at bedtime and an 81 mg aspirin taken as well. She was told by the pharmacy that this did have some interaction with Cardizem and doses exceeding 5 mg so we have not been of the opinion that 5 mg of simvastatin would be helpful so I am changing that in stopping the Zocor and changing her to  Crestor at 5 mg by mouth daily at bedtime. The most recent lipids reported showed triglyceride remains slightly elevated at 178 and cholesterol was good at 149. Other lipids were essentially unremarkable. She recently heard that simvastatin can cause cancer. She is concerned over the cost of the Crestor today. Though she only pays $8 for it there is a considerable amount of money over that amount that goes towards her medication threshold for the year so we did therefore try Lipitor 20 mg. It was written at 1 by mouth daily and she  did have her begin by taking it one half daily to see if she tolerates it well.   Review of Systems Constitutional: Negative. HENT: Negative. Eyes: Negative. Respiratory: Negative for cough, chest tightness, shortness of breath and wheezing. Cardiovascular: Negative for chest pain, palpitations and leg swelling. Gastrointestinal: Negative for blood in stool, constipation, diarrhea, nausea and vomiting. Genitourinary: Negative for hematuria. Musculoskeletal: Positive for arthralgias and back pain. Skin: Negative. Neurological: Negative. Psychiatric/Behavioral: Negative. Objective:   Physical Exam  Vitals signs and nursing note reviewed. Constitutional:       General: She is not in acute distress. Appearance: She is well-developed and well-groomed. She is obese. She is not ill-appearing, toxic-appearing or diaphoretic. Comments: Weight remains in excess of ideal.   HENT:      Head: Normocephalic and atraumatic. Right Ear: Tympanic membrane, ear canal and external ear normal. There is no impacted cerumen. Left Ear: Tympanic membrane, ear canal and external ear normal. There is no impacted cerumen. Nose: Nose normal.      Mouth/Throat:      Lips: Pink. Mouth: Mucous membranes are moist.      Dentition: Normal dentition. Pharynx: Oropharynx is clear. Uvula midline. Eyes:      General: Lids are normal. No scleral icterus. Right eye: No discharge. Left eye: No discharge. Extraocular Movements: Extraocular movements intact. Conjunctiva/sclera: Conjunctivae normal.      Right eye: Right conjunctiva is not injected. Left eye: Left conjunctiva is not injected. Pupils: Pupils are equal, round, and reactive to light. Neck:      Musculoskeletal: Full passive range of motion without pain, normal range of motion and neck supple. No neck rigidity or muscular tenderness. Thyroid: No thyromegaly. Vascular: No carotid bruit or JVD. Cardiovascular:      Rate and Rhythm: Normal rate and regular rhythm.       Pulses: Judgment normal.         /60 (Site: Right Upper Arm, Position: Sitting, Cuff Size: Medium Adult)   Pulse 62   Temp 96.6 °F (35.9 °C)   Resp 16   Ht 5' 4\" (1.626 m)   Wt 172 lb (78 kg)   SpO2 98%   BMI 29.52 kg/m²  /60 (Site: Right Upper Arm, Position: Sitting, Cuff Size: Medium Adult)   Pulse 62   Temp 96.6 °F (35.9 °C)   Resp 16   Ht 5' 4\" (1.626 m)   Wt 172 lb (78 kg)   SpO2 98%   BMI 29.52 kg/m²     Assessment:         Diagnosis Orders   1. Essential hypertension     2. Gastroesophageal reflux disease without esophagitis     3. Acquired hypothyroidism     4. Chronic midline low back pain with left-sided sciatica  oxyCODONE-acetaminophen (PERCOCET)  MG per tablet   5. Primary osteoarthritis involving multiple joints     6. Vitamin D deficiency     7. Edema, unspecified type  CBC    Basic Metabolic Panel           Plan:      I am having Ms. Lillian Dewitt maintain her :   Outpatient Medications Marked as Taking for the 8/4/20 encounter (Office Visit) with Sabrina Harper MD   Medication Sig Dispense Refill    oxyCODONE-acetaminophen (PERCOCET)  MG per tablet Take 1 tablet by mouth every 6 hours as needed for Pain for up to 30 days. 120 tablet 0     Medications Discontinued During This Encounter   Medication Reason    oxyCODONE-acetaminophen (PERCOCET)  MG per tablet REORDER      Requested Prescriptions     Signed Prescriptions Disp Refills    oxyCODONE-acetaminophen (PERCOCET)  MG per tablet 120 tablet 0     Sig: Take 1 tablet by mouth every 6 hours as needed for Pain for up to 30 days.    .     Orders Placed This Encounter   Procedures    CBC     Standing Status:   Future     Number of Occurrences:   1     Standing Expiration Date:   8/4/2021    Basic Metabolic Panel     Standing Status:   Future     Number of Occurrences:   1     Standing Expiration Date:   8/4/2021     Orders Placed This Encounter   Medications    oxyCODONE-acetaminophen (PERCOCET)  MG per tablet     Sig: Take 1 tablet by mouth every 6 hours as needed for Pain for up to 30 days.      Dispense:  120 tablet     Refill:  0     Reduce doses taken as pain becomes manageable             Kody Hensley MD

## 2020-09-16 PROBLEM — I50.32 CHRONIC DIASTOLIC CONGESTIVE HEART FAILURE (HCC): Status: ACTIVE | Noted: 2020-01-01

## 2020-09-16 NOTE — PROGRESS NOTES
Samuel Salas is a 80 y.o. female evaluated via telephone on 9/16/2020. This consultation today is being done by way of telehealth phone conference call. The patient is being consulted by telehealth due to the current pandemic in [place] caused by coronavirus. Patient has been advised that there is a bill to her insurance provider for this telehealth service.     This patient is seen here intermittently by the undersigned for management of chronic disease states. She does have chronic midline low back pain with left-sided sciatica.  She has a history of primary osteoarthritis affecting multiple joints and also does have a history of pelvic fracture in the past.  She currently is maintained on her cassette 10/325 utilized up to every 6 hour intervals for her pain.  She is no longer taking Naprosyn since it caused her to have significant nausea.  She has tolerated Mobic 15 mg p.o. daily for the that in the past so we are going to refill the Mobic for her today.  She does have Carafate available at 1 g before meals and at bedtime and she was instructed to use 1 of those each time she takes her meloxicam also.  She previously was taking Nexium but has been able to discontinue that since she had Carafate.     She does have a history of essential hypertension.  She is on lisinopril/HCTZ at 10/12.5 taken twice daily.  She states that she does monitor her pressure and her pressures are doing well. .  Additionally, she also takes Lasix at 20 mg p.o. daily.  Meds are tolerated well.  Diltiazem 90 is taken twice daily.     She does have a history of acquired hypothyroidism.  She is on levothyroxine 50 mcg taken once daily.  She tolerates this well and it has been hopeful with regulation of thyroid disorder.     For hyperlipidemia, she is on Crestor 5 mg p.o. nightly.  Additionally she takes an 81 mg aspirin every other day.     She does have glaucoma.  She is maintained on Xalatan 0.005% drop at 1 daily OU.     She does take vitamin D supplementation with over-the-counter cholecalciferol 1000 unit tablet daily.     Presently, she denies any chest pain, shortness of breath, pedal edema, or other new issues as of this time.  Medicines are being sent to [facility] per her request.     Diagnosis Orders   1. Essential hypertension     2. Chronic diastolic congestive heart failure ([address]     3. Seasonal allergic rhinitis, unspecified trigger     4. Gastroesophageal reflux disease without esophagitis     5. Chronic midline low back pain with left-sided sciatica  oxyCODONE-acetaminophen (PERCOCET)  MG per tablet   6. Vitamin D deficiency     7. Hypertriglyceridemia       I am having Ms. Debra Lucas maintain her :   Outpatient Medications Marked as Taking for the 9/16/20 encounter (Virtual Visit) with Lucy Diego MD   Medication Sig Dispense Refill    oxyCODONE-acetaminophen (PERCOCET)  MG per tablet Take 1 tablet by mouth every 6 hours as needed for Pain for up to 30 days. 120 tablet 0   ,  Medications Discontinued During This Encounter   Medication Reason    oxyCODONE-acetaminophen (PERCOCET)  MG per tablet REORDER      Requested Prescriptions     Signed Prescriptions Disp Refills    oxyCODONE-acetaminophen (PERCOCET)  MG per tablet 120 tablet 0     Sig: Take 1 tablet by mouth every 6 hours as needed for Pain for up to 30 days. .     Consent:  She and/or health care decision maker is aware that that she may receive a bill for this telephone service, depending on her insurance coverage, and has provided verbal consent to proceed: Yes      Documentation:  I communicated with the patient and/or health care decision maker about . Details of this discussion including any medical advice provided:       I affirm this is a Patient Initiated Episode with a Patient who has not had a related appointment within my department in the past 7 days or scheduled within the next 24 hours.     Patient identification was verified at the start of the visit: Yes    Total Time: minutes: 11-20 minutes    Note: not billable if this call serves to triage the patient into an appointment for the relevant concern      Saranya Trinh

## 2020-10-19 NOTE — PROGRESS NOTES
Medicare Annual Wellness Visit  Name: Luis M Ferrera Date: 10/19/2020   MRN: [id number] Sex: Female   Age: 80 y.o. Ethnicity: Non-/Non    : 1933 Race: Davida March is here for Medicare AWV    Screenings for behavioral, psychosocial and functional/safety risks, and cognitive dysfunction are all negative except as indicated below. These results, as well as other patient data from the [address] form, are documented in Flowsheets linked to this Encounter. Allergies   Allergen Reactions    Detrol [Tolterodine Tartrate] Swelling    Mobic [Meloxicam] Nausea Only     Medication was very fairly effective but did cause some GI distress.  Naprosyn [Naproxen]      N/v      Bactrim [Sulfamethoxazole-Trimethoprim] Nausea And Vomiting       Prior to Visit Medications    Medication Sig Taking? Authorizing Provider   lisinopril-hydroCHLOROthiazide (PRINZIDE;ZESTORETIC) 10-12.5 MG per tablet Take 1 tablet by mouth twice daily Yes Sherin Miller MD   dilTIAZem (CARDIZEM) 90 MG tablet Take 1 tablet by mouth twice daily Yes Sherin Miller MD   furosemide (LASIX) 40 MG tablet Take 1 tablet by mouth daily Yes Sherin Miller MD   rosuvastatin (CRESTOR) 5 MG tablet Take 1 tablet by mouth daily Yes Sherin Miller MD   levothyroxine (SYNTHROID) 50 MCG tablet TAKE 1 TABLET BY MOUTH ONCE DAILY Yes Sherin Miller MD   furosemide (LASIX) 20 MG tablet TAKE 1 TABLET BY MOUTH ONCE DAILY Yes Shrein Miller MD   cetirizine (ZYRTEC) 10 MG tablet Take 10 mg by mouth daily Yes Historical Provider, MD   Omega-3 Fatty Acids (FISH OIL) 1000 MG CAPS Take 3,000 mg by mouth daily Yes Historical Provider, MD   sucralfate (CARAFATE) 1 GM tablet TAKE ONE TABLET BY MOUTH FOUR TIMES DAILY.  TAKE WITH MELOXICAM. Yes Sherin Miller MD   diclofenac 2 % SOLN Place 1 each onto the skin 4 times daily as needed (pain) Yes Sherin Miller MD   esomeprazole (NEXIUM) 40 MG delayed release capsule TAKE ONE [place] Yes Dane Riley MD   aspirin 81 MG tablet Take 81 mg by mouth daily One every other day Yes Dane Riley MD   Multiple Vitamins-Minerals (PRESERVISION AREDS 2) CAPS Take  by mouth. Yes Historical Provider, MD   latanoprost (XALATAN) 0.005 % ophthalmic solution 1 drop daily. Yes Historical Provider, MD   Multiple Vitamins-Minerals (THERAPEUTIC MULTIVITAMIN-MINERALS) tablet Take 1 tablet by mouth daily. Yes Historical Provider, MD   calcium carbonate (OSCAL) 500 MG TABS tablet Take 500 mg by mouth daily. Yes Historical Provider, MD   vitamin D3 (CHOLECALCIFEROL) 1000 UNITS TABS tablet Take 1,000 Units by mouth daily. Yes Historical Provider, MD       Past Medical History:   Diagnosis Date    Allergic rhinitis     Chronic back pain     Hiatal hernia     History of hematuria     History of shingles     Hyperglycemia     Hyperlipidemia     Hypertension     Hypertriglyceridemia     Hypothyroidism     MVP (mitral valve prolapse) \    Osteoarthritis        Past Surgical History:   Procedure Laterality Date    ADENOIDECTOMY      APPENDECTOMY      BACK SURGERY      BRAIN TUMOR EXCISION      Cystic    EYE SURGERY Right     laser surgery    HYSTERECTOMY      JOINT REPLACEMENT  03/11/2015    right hip replacement    TONSILLECTOMY         No family history on file. CareTeam (Including outside providers/suppliers regularly involved in providing care):   Patient Care Team:  Dane Riley MD as PCP - General (Family Medicine)  Dane Riley MD as PCP - REHABILITATION [facility] Empaneled Provider    Wt Readings from Last 3 Encounters:   10/19/20 169 lb (76.7 kg)   08/04/20 172 lb (78 kg)   07/01/20 177 lb 6.4 oz (80.5 kg)     Vitals:    10/19/20 1359   Temp: 97.8 °F (36.6 °C)   Weight: 169 lb (76.7 kg)   Height: 5' 4\" (1.626 m)     Body mass index is 29.01 kg/m². Based upon direct observation of the patient, evaluation of cognition reveals recent and remote memory intact.     Patient's complete Health Risk Assessment and screening values have been reviewed and are found in Flowsheets. The following problems were reviewed today and where indicated follow up appointments were made and/or referrals ordered. Positive Risk Factor Screenings with Interventions:     Depression:  PHQ-2 Score: 3  PHQ-9 Total Score: 6    Severity:1-4 = minimal depression, 5-9 = mild depression, 10-14 = moderate depression, 15-19 = moderately severe depression, 20-27 = severe depression  Depression Interventions:  · Patient reports she is not depressed but she has pain and does not get around well. She reports she just isnt able to get out like she used to. · Relaxation techniques discussed    General Health and ACP:  General  In general, how would you say your health is?: Fair  In the past 7 days, have you experienced any of the following?  New or Increased Pain, New or Increased Fatigue, Loneliness, Social Isolation, Stress or Anger?: None of These  Do you get the social and emotional support that you need?: Yes  Do you have a Living Will?: Yes  Advance Directives     Power of  Living Will ACP-Advance Directive ACP-Power of     Not on File Not on File [address] [facility] Risk Interventions:  · No Living Will: ACP documents already completed- patient asked to provide copy to the office    Health Habits/Nutrition:  Health Habits/Nutrition  Do you exercise for at least 20 minutes 2-3 times per week?: (!) No  Have you lost any weight without trying in the past 3 months?: No  Do you eat fewer than 2 meals per day?: No  Have you seen a dentist within the past year?: (!) No(Patient has upper and lower dentures.)  Body mass index: (!) 29.01  Health Habits/Nutrition Interventions:  · Inadequate physical activity:  patient is not ready to increase his/her physical activity level at this time  · Dental exam overdue:  patient declines dental evaluation    Personalized Preventive Plan   Current Health Maintenance Status  Immunization History   Administered Date(s) Administered    Influenza Virus Vaccine 10/07/2014    Influenza, High Dose (Fluzone 65 yrs and older) 10/20/2015, 10/25/2017, 10/05/2018    Influenza, João Half, IM, (6 mo and older Fluzone, Flulaval, Fluarix and 3 yrs and older Afluria) 10/06/2016    Influenza, Triv, inactivated, subunit, adjuvanted, IM (Fluad 65 yrs and older) 10/24/2019    Pneumococcal Conjugate 13-valent ([id number]) 10/06/2016    Pneumococcal Polysaccharide ([id number]) 10/07/2014        Health Maintenance   Topic Date Due    DTaP/Tdap/Td vaccine (1 - Tdap) [birth year]/1952    Shingles Vaccine (1 of 2) 02/06/1983    Lipid screen  04/20/2019    TSH testing  04/20/2019    Annual Wellness Visit (AWV)  05/29/2019    Flu vaccine (1) 09/01/2020    Potassium monitoring  08/04/2021    Creatinine monitoring  08/04/2021    Pneumococcal 65+ years Vaccine  Completed    Hepatitis A vaccine  Aged Out    Hepatitis B vaccine  Aged Out    Hib vaccine  Aged Out    Meningococcal (ACWY) vaccine  Aged Out     Recommendations for [place] Due: see orders and patient instructions/AVS.  . Health Maintenance plan reviewed with patient. Recommended screening schedule for the next 5-10 years is provided to the patient in written form: see Patient Instructions/AVS.    David Moody LPN, 81/82/2774, performed the documented evaluation under the direct supervision of the attending physician. Gama De La Rosa is a 80 y.o. female evaluated via telephone on 10/19/2020. Consent:  She and/or health care decision maker is aware that that she may receive a bill for this telephone service, depending on her insurance coverage, and has provided verbal consent to proceed: Yes      Documentation:  I communicated with the patient and/or health care decision maker about AWV. Details of this discussion including any medical advice provided.       I affirm this is a Patient Initiated Episode with a Patient who has not had a related appointment within my department in the past 7 days or scheduled within the next 24 hours.     Patient identification was verified at the start of the visit: Yes    Total Time: minutes: 21-30 minutes    Note: not billable if this call serves to triage the patient into an appointment for the relevant concern      Keyana Tucker

## 2020-10-19 NOTE — PATIENT INSTRUCTIONS
Personalized Preventive Plan for Baldemar Betancourt - 10/19/2020  Medicare offers a range of preventive health benefits. Some of the tests and screenings are paid in full while other may be subject to a deductible, co-insurance, and/or copay. Some of these benefits include a comprehensive review of your medical history including lifestyle, illnesses that may run in your family, and various assessments and screenings as appropriate. After reviewing your medical record and screening and assessments performed today your provider may have ordered immunizations, labs, imaging, and/or referrals for you. A list of these orders (if applicable) as well as your Preventive Care list are included within your After Visit Summary for your review. Other Preventive Recommendations:    · A preventive eye exam performed by an eye specialist is recommended every 1-2 years to screen for glaucoma; cataracts, macular degeneration, and other eye disorders. · A preventive dental visit is recommended every 6 months. · Try to get at least 150 minutes of exercise per week or 10,000 steps per day on a pedometer . · Order or download the FREE \"Exercise & Physical Activity: Your Everyday Guide\" from [employer]. Call 8-223.882.3205 or search [employer] online. · You need 7907-0203 mg of calcium and 4342-4310 IU of vitamin D per day. It is possible to meet your calcium requirement with diet alone, but a vitamin D supplement is usually necessary to meet this goal.  · When exposed to the sun, use a sunscreen that protects against both UVA and UVB radiation with an SPF of 30 or greater. Reapply every 2 to 3 hours or after sweating, drying off with a towel, or swimming. · Always wear a seat belt when traveling in a car. Always wear a helmet when riding a bicycle or motorcycle.

## 2020-10-27 NOTE — PROGRESS NOTES
Ricarda Rossi is a 80 y.o. female evaluated via telephone on 10/27/2020. This consultation today is being done by way of telehealth phone conference call. Kayla Townsend patient is being consulted by telehealth due to the current pandemic in [place] caused by Nati Choudhury has been advised that there is a bill to her insurance provider for this telehealth service.     This patient is seen here intermittently by the undersigned for management of chronic disease states. She does have chronic midline low back pain with left-sided sciatica.  She has a history of primary osteoarthritis affecting multiple joints and also does have a history of pelvic fracture in the past.  She currently is maintained on her cassette 10/325 utilized up to every 6 hour intervals for her pain.  She is no longer taking Naprosyn since it caused her to have significant nausea.  She has tolerated Mobic 15 mg p.o. daily for the that in the past so we are going to refill the Mobic for her today.  She does have Carafate available at 1 g before meals and at bedtime and she was instructed to use 1 of those each time she takes her meloxicam also.  She previously was taking Nexium but has been able to discontinue that since she had Carafate.     She does have a history of essential hypertension.  She is on lisinopril/HCTZ at 10/12.5 taken twice daily.  She states that she does monitor her pressure and her pressures are doing well. .  Additionally, she also takes Lasix at 20 mg p.o. daily.  Meds are tolerated well.  Diltiazem 90 is taken twice daily.     She does have a history of acquired hypothyroidism.  She is on levothyroxine 50 mcg taken once daily.  She tolerates this well and it has been hopeful with regulation of thyroid disorder.     For hyperlipidemia, she is on Crestor 5 mg p.o. nightly.  Additionally she takes an 81 mg aspirin every other day.     She does have glaucoma.  She is maintained on Xalatan 0.005% drop at 1 daily OU.     She does take vitamin D supplementation with over-the-counter cholecalciferol 1000 unit tablet daily.     Presently, she denies any chest pain, shortness of breath, pedal edema, or other new issues as of this time.  Medicines are being sent to [facility] per her request.      Diagnosis Orders   1. Essential hypertension     2. Gastroesophageal reflux disease without esophagitis     3. Acquired hypothyroidism     4. Chronic midline low back pain with left-sided sciatica  oxyCODONE-acetaminophen (PERCOCET)  MG per tablet   5. Post-traumatic osteoarthritis of multiple joints     6. Hypertriglyceridemia     7. Vitamin D deficiency       I am having Ms. Augusta Ocampo maintain her :   Outpatient Medications Marked as Taking for the 10/27/20 encounter (Virtual Visit) with Marilyn Wylie MD   Medication Sig Dispense Refill    oxyCODONE-acetaminophen (PERCOCET)  MG per tablet Take 1 tablet by mouth every 6 hours as needed for Pain for up to 30 days. 120 tablet 0     Medications Discontinued During This Encounter   Medication Reason    oxyCODONE-acetaminophen (PERCOCET)  MG per tablet REORDER     Requested Prescriptions     Signed Prescriptions Disp Refills    oxyCODONE-acetaminophen (PERCOCET)  MG per tablet 120 tablet 0     Sig: Take 1 tablet by mouth every 6 hours as needed for Pain for up to 30 days. .       Consent:  She and/or health care decision maker is aware that that she may receive a bill for this telephone service, depending on her insurance coverage, and has provided verbal consent to proceed: Yes      Documentation:  I communicated with the patient and/or health care decision maker about . Details of this discussion including any medical advice provided:       I affirm this is a Patient Initiated Episode with a Patient who has not had a related appointment within my department in the past 7 days or scheduled within the next 24 hours.     Patient identification was verified at the start of the visit: Yes    Total Time: minutes: 11-20 minutes    Note: not billable if this call serves to triage the patient into an appointment for the relevant concern      Sherin Miller

## 2020-12-08 NOTE — PROGRESS NOTES
Lord Blevins is a 80 y.o. female evaluated via telephone on 12/8/2020. This consultation today is being done by way of telehealth phone conference call. Jimi Osman patient is being consulted by telehealth due to the current pandemic in [place] caused by Ileana Broderick has been advised that there is a bill to her insurance provider for this telehealth service.     This patient is seen here intermittently by the undersigned for management of chronic disease states. She does have chronic midline low back pain with left-sided sciatica.  She has a history of primary osteoarthritis affecting multiple joints and also does have a history of pelvic fracture in the past.  She currently is maintained on her cassette 10/325 utilized up to every 6 hour intervals for her pain.  She is no longer taking Naprosyn since it caused her to have significant nausea.  She has tolerated Mobic 15 mg p.o. daily for the that in the past so we are going to refill the Mobic for her today.  She does have Carafate available at 1 g before meals and at bedtime and she was instructed to use 1 of those each time she takes her meloxicam also.  She previously was taking Nexium but has been able to discontinue that since she had Carafate.     She does have a history of essential hypertension.  She is on lisinopril/HCTZ at 10/12.5 taken twice daily.  She states that she does monitor her pressure and her pressures are doing well. .  Additionally, she also takes Lasix at 20 mg p.o. daily.  Meds are tolerated well.  Diltiazem 90 is taken twice daily.     She does have a history of acquired hypothyroidism.  She is on levothyroxine 50 mcg taken once daily.  She tolerates this well and it has been hopeful with regulation of thyroid disorder.     For hyperlipidemia, she is on Crestor 5 mg p.o. nightly.  Additionally she takes an 81 mg aspirin every other day.     She does have glaucoma.  She is maintained on Xalatan 0.005% drop at 1 daily OU.     She does take vitamin D supplementation with over-the-counter cholecalciferol 1000 unit tablet daily.     Presently, she denies any chest pain, shortness of breath, pedal edema, or other new issues as of this time.  Medicines are being sent to [facility] per her request.   Diagnosis Orders   1. Chronic midline low back pain with left-sided sciatica  oxyCODONE-acetaminophen (PERCOCET)  MG per tablet   2. Essential hypertension     3. Gastroesophageal reflux disease without esophagitis     4. Acquired hypothyroidism     5. Post-traumatic osteoarthritis of multiple joints     6. Vitamin D deficiency     7. Hypertriglyceridemia       I am having Ms. Florence Denise maintain her :   Outpatient Medications Marked as Taking for the 12/8/20 encounter (Virtual Visit) with Bryant Lagos MD   Medication Sig Dispense Refill    oxyCODONE-acetaminophen (PERCOCET)  MG per tablet Take 1 tablet by mouth every 6 hours as needed for Pain for up to 30 days. 120 tablet 0     Medications Discontinued During This Encounter   Medication Reason    oxyCODONE-acetaminophen (PERCOCET)  MG per tablet REORDER      Requested Prescriptions     Signed Prescriptions Disp Refills    oxyCODONE-acetaminophen (PERCOCET)  MG per tablet 120 tablet 0     Sig: Take 1 tablet by mouth every 6 hours as needed for Pain for up to 30 days. .       Consent:  She and/or health care decision maker is aware that that she may receive a bill for this telephone service, depending on her insurance coverage, and has provided verbal consent to proceed: Yes      Documentation:  I communicated with the patient and/or health care decision maker about . Details of this discussion including any medical advice provided:       I affirm this is a Patient Initiated Episode with a Patient who has not had a related appointment within my department in the past 7 days or scheduled within the next 24 hours.     Patient identification was verified at the start of the visit: Yes    Total Time: minutes: 11-20 minutes    Note: not billable if this call serves to triage the patient into an appointment for the relevant concern      Iam Saleh

## 2021-01-01 ENCOUNTER — VIRTUAL VISIT (OUTPATIENT)
Dept: FAMILY MEDICINE CLINIC | Age: 86
End: 2021-01-01
Payer: MEDICARE

## 2021-01-01 ENCOUNTER — TELEPHONE (OUTPATIENT)
Dept: FAMILY MEDICINE CLINIC | Age: 86
End: 2021-01-01

## 2021-01-01 DIAGNOSIS — R60.9 EDEMA, UNSPECIFIED TYPE: ICD-10-CM

## 2021-01-01 DIAGNOSIS — M54.42 CHRONIC MIDLINE LOW BACK PAIN WITH LEFT-SIDED SCIATICA: ICD-10-CM

## 2021-01-01 DIAGNOSIS — E03.9 HYPOTHYROIDISM: ICD-10-CM

## 2021-01-01 DIAGNOSIS — E03.9 ACQUIRED HYPOTHYROIDISM: ICD-10-CM

## 2021-01-01 DIAGNOSIS — G89.29 CHRONIC MIDLINE LOW BACK PAIN WITH LEFT-SIDED SCIATICA: ICD-10-CM

## 2021-01-01 DIAGNOSIS — E55.9 VITAMIN D DEFICIENCY: ICD-10-CM

## 2021-01-01 DIAGNOSIS — K21.9 GASTROESOPHAGEAL REFLUX DISEASE WITHOUT ESOPHAGITIS: ICD-10-CM

## 2021-01-01 DIAGNOSIS — E78.1 HYPERTRIGLYCERIDEMIA: ICD-10-CM

## 2021-01-01 DIAGNOSIS — I10 ESSENTIAL HYPERTENSION: ICD-10-CM

## 2021-01-01 DIAGNOSIS — I50.32 CHRONIC DIASTOLIC CONGESTIVE HEART FAILURE (HCC): ICD-10-CM

## 2021-01-01 DIAGNOSIS — F51.01 PRIMARY INSOMNIA: ICD-10-CM

## 2021-01-01 DIAGNOSIS — M15.8 OTHER OSTEOARTHRITIS INVOLVING MULTIPLE JOINTS: ICD-10-CM

## 2021-01-01 DIAGNOSIS — R53.82 CHRONIC FATIGUE: ICD-10-CM

## 2021-01-01 PROCEDURE — 1090F PRES/ABSN URINE INCON ASSESS: CPT | Performed by: FAMILY MEDICINE

## 2021-01-01 PROCEDURE — 99214 OFFICE O/P EST MOD 30 MIN: CPT | Performed by: FAMILY MEDICINE

## 2021-01-01 PROCEDURE — 4040F PNEUMOC VAC/ADMIN/RCVD: CPT | Performed by: FAMILY MEDICINE

## 2021-01-01 PROCEDURE — 1123F ACP DISCUSS/DSCN MKR DOCD: CPT | Performed by: FAMILY MEDICINE

## 2021-01-01 PROCEDURE — 1036F TOBACCO NON-USER: CPT | Performed by: FAMILY MEDICINE

## 2021-01-01 PROCEDURE — G8427 DOCREV CUR MEDS BY ELIG CLIN: HCPCS | Performed by: FAMILY MEDICINE

## 2021-01-01 PROCEDURE — G8417 CALC BMI ABV UP PARAM F/U: HCPCS | Performed by: FAMILY MEDICINE

## 2021-01-01 PROCEDURE — 99442 PR PHYS/QHP TELEPHONE EVALUATION 11-20 MIN: CPT | Performed by: FAMILY MEDICINE

## 2021-01-01 RX ORDER — TRAZODONE HYDROCHLORIDE 50 MG/1
50 TABLET ORAL NIGHTLY
Qty: 30 TABLET | Refills: 1 | Status: SHIPPED | OUTPATIENT
Start: 2021-01-01 | End: 2021-01-01 | Stop reason: ALTCHOICE

## 2021-01-01 RX ORDER — OXYCODONE AND ACETAMINOPHEN 10; 325 MG/1; MG/1
1 TABLET ORAL EVERY 6 HOURS PRN
Qty: 120 TABLET | Refills: 0 | Status: SHIPPED | OUTPATIENT
Start: 2021-01-01 | End: 2021-01-01 | Stop reason: SDUPTHER

## 2021-01-01 RX ORDER — DILTIAZEM HCL 90 MG
TABLET ORAL
Qty: 180 TABLET | Refills: 0 | Status: SHIPPED | OUTPATIENT
Start: 2021-01-01 | End: 2021-01-01

## 2021-01-01 RX ORDER — LISINOPRIL AND HYDROCHLOROTHIAZIDE 12.5; 1 MG/1; MG/1
TABLET ORAL
Qty: 180 TABLET | Refills: 0 | Status: SHIPPED | OUTPATIENT
Start: 2021-01-01

## 2021-01-01 RX ORDER — SUCRALFATE 1 G/1
TABLET ORAL
Qty: 360 TABLET | Refills: 0 | Status: SHIPPED | OUTPATIENT
Start: 2021-01-01

## 2021-01-01 RX ORDER — LEVOTHYROXINE SODIUM 0.05 MG/1
TABLET ORAL
Qty: 90 TABLET | Refills: 0 | Status: SHIPPED | OUTPATIENT
Start: 2021-01-01

## 2021-01-01 RX ORDER — FUROSEMIDE 20 MG/1
20 TABLET ORAL SEE ADMIN INSTRUCTIONS
Qty: 80 TABLET | Refills: 5 | Status: SHIPPED | OUTPATIENT
Start: 2021-01-01

## 2021-01-01 RX ORDER — ZOLPIDEM TARTRATE 10 MG/1
10 TABLET ORAL NIGHTLY PRN
Qty: 30 TABLET | Refills: 0 | Status: SHIPPED | OUTPATIENT
Start: 2021-01-01 | End: 2021-01-01

## 2021-01-01 RX ORDER — FUROSEMIDE 40 MG/1
40 TABLET ORAL 2 TIMES DAILY
Qty: 180 TABLET | Refills: 3 | Status: SHIPPED | OUTPATIENT
Start: 2021-01-01 | End: 2021-07-22

## 2021-01-01 RX ORDER — OXYCODONE AND ACETAMINOPHEN 10; 325 MG/1; MG/1
1 TABLET ORAL EVERY 6 HOURS PRN
Qty: 120 TABLET | Refills: 0 | Status: SHIPPED | OUTPATIENT
Start: 2021-01-01 | End: 2021-06-16

## 2021-01-01 RX ORDER — DILTIAZEM HCL 90 MG
TABLET ORAL
Qty: 180 TABLET | Refills: 0 | Status: SHIPPED | OUTPATIENT
Start: 2021-01-01 | End: 2021-01-01 | Stop reason: SDUPTHER

## 2021-01-01 RX ORDER — OXYCODONE AND ACETAMINOPHEN 10; 325 MG/1; MG/1
1 TABLET ORAL EVERY 6 HOURS PRN
Qty: 120 TABLET | Refills: 0 | Status: SHIPPED | OUTPATIENT
Start: 2021-01-01 | End: 2021-01-01

## 2021-01-01 RX ORDER — DILTIAZEM HCL 90 MG
TABLET ORAL
Qty: 180 TABLET | Refills: 0 | Status: SHIPPED | OUTPATIENT
Start: 2021-01-01

## 2021-01-01 ASSESSMENT — ENCOUNTER SYMPTOMS
BACK PAIN: 1
COUGH: 0
EYES NEGATIVE: 1
COUGH: 0
WHEEZING: 0
DIARRHEA: 0
EYES NEGATIVE: 1
VOMITING: 0
NAUSEA: 0
DIARRHEA: 0
SHORTNESS OF BREATH: 0
BLOOD IN STOOL: 0
BACK PAIN: 1
NAUSEA: 0
WHEEZING: 0
CONSTIPATION: 0
CHEST TIGHTNESS: 0
CHEST TIGHTNESS: 0
SHORTNESS OF BREATH: 0
VOMITING: 0
CONSTIPATION: 0

## 2021-01-20 NOTE — TELEPHONE ENCOUNTER
Daughter in law stated had fall. Contact # [phone]. Stated refuses to come to the doctor. Had fall and is holding fluid.

## 2021-03-16 NOTE — PROGRESS NOTES
take vitamin D supplementation with over-the-counter cholecalciferol 1000 unit tablet daily.     Presently, she denies any chest pain, shortness of breath, pedal edema, or other new issues as of this time.  Medicines are being sent to [address] [place] [address] per her request.     Diagnosis Orders   1. Essential hypertension     2. Chronic midline low back pain with left-sided sciatica  oxyCODONE-acetaminophen (PERCOCET)  MG per tablet   3. Chronic diastolic congestive heart failure ([address]     4. Gastroesophageal reflux disease without esophagitis     5. Acquired hypothyroidism     6. Other osteoarthritis involving multiple joints     7. Hypertriglyceridemia     8. Vitamin D deficiency       I am having Ms. Meng Chung maintain her :   Outpatient Medications Marked as Taking for the 3/16/21 encounter (Virtual Visit) with Lavonia Kawasaki, MD   Medication Sig Dispense Refill    oxyCODONE-acetaminophen (PERCOCET)  MG per tablet Take 1 tablet by mouth every 6 hours as needed for Pain for up to 30 days. 120 tablet 0     Medications Discontinued During This Encounter   Medication Reason    oxyCODONE-acetaminophen (PERCOCET)  MG per tablet REORDER     Requested Prescriptions     Signed Prescriptions Disp Refills    oxyCODONE-acetaminophen (PERCOCET)  MG per tablet 120 tablet 0     Sig: Take 1 tablet by mouth every 6 hours as needed for Pain for up to 30 days. .                 Consent:  She and/or health care decision maker is aware that that she may receive a bill for this telephone service, depending on her insurance coverage, and has provided verbal consent to proceed: Yes      Documentation:  I communicated with the patient and/or health care decision maker about .    Details of this discussion including any medical advice provided:       I affirm this is a Patient Initiated Episode with a Patient who has not had a related appointment within my department in the past 7 days or scheduled within the next 24 hours. Patient identification was verified at the start of the visit: Yes    Total Time: minutes: 11-20 minutes    The visit was conducted pursuant to the emergency declaration under the [address] [place] [address] and the [employer] General Act. Patient identification was verified, and a caregiver was present when appropriate. The patient was located in a state where the provider was credentialed to provide care.     Note: not billable if this call serves to triage the patient into an appointment for the relevant concern      Sander Gómez

## 2021-04-23 NOTE — PROGRESS NOTES
4/23/2021    TELEHEALTH EVALUATION -- Audio/Visual (During [id number] public health emergency)  This patient is being seen today by telehealth using doxy. me. We are completing this called her a caretaker who is helping her. Telehealth is implemented due to the current pandemic in [place] caused by coronavirus. The patient previously has been advised that the services billed to her insurance by [facility] ([facility]). HPI: This patient is seen here intermittently by the undersigned for management of chronic disease states. The patient's daughter-in-law facilitated the call on her phone. The daughter-in-law did inform me that she and her  are going to be with Mrs. Wisam Mathew now 24/7. This certainly is a good situation to be looked after so attentively by family. She states that Ms. Lavelle Patel has been doing well and Ms. Dale herself stated that she had been doing well. The daughter-in-law did mention that the legs have been swelling in had some pitting edema. She was able to focus the camera on this and she does indeed have +2 to +3 pitting pedal edema which is bilateral.  She denies any pain in her legs. She is sitting most of the time with her legs in a dependent position. I did encourage her to try to use a recliner to elevate. Also she should be sure she drinks plenty of water and avoid salt intake. She is on Lasix at 40 mg p.o. daily so I am going to raise this up to 40 mg p.o. twice daily. She was instructed to be sure to have her eat a banana or an orange each day or 1 of each. This would help restore any potential depleted potassium caused by the excess Lasix. It is remembered that she also does take hydrochlorothiazide as well. This is utilized in the lisinopril/HCTZ at a dose of 10/12.5 taken twice daily.     She does have chronic midline low back pain with left-sided sciatica.  She has a history of primary osteoarthritis affecting multiple joints and also does have a history of pelvic fracture in the past. Nikkie Rosenberg currently is maintained on Percocet 10/325 utilized up to every 6 hour intervals for her pain. Nikkie Rosenberg is no longer taking Naprosyn since it caused her to have significant nausea.  She has tolerated Mobic 15 mg p.o. daily for the that in the past so we are going to refill the Mobic for her today.  She does have Carafate available at 1 g before meals and at bedtime and she was instructed to use 1 of those each time she takes her meloxicam also.  She previously was taking Nexium but has been able to discontinue that since she had Carafate. Additionally, today the daughter-in-law states that Mrs. Dale does have some problems sleeping at night. In an effort to try to help with this, we are going to add trazodone 50 mg and have her use 1 about 30 minutes prior to hour of sleep.     She does have a history of essential hypertension.  She is on lisinopril/HCTZ at 10/12.5 taken twice daily.  She states that she does monitor her pressure and her pressures are doing well. . We are raising the Lasix up from 40 mg daily to 40 mg twice daily because of her pedal edema. This is noted above.  Diltiazem 90 is taken twice daily.     She does have a history of acquired hypothyroidism.  She is on levothyroxine 50 mcg taken once daily.  She tolerates this well and it has been hopeful with regulation of thyroid disorder.     For hyperlipidemia, she is on Crestor 5 mg p.o. nightly.  Additionally she takes an 81 mg aspirin every other day.     She does have glaucoma.  She is maintained on Xalatan 0.005% drop at 1 daily OU.     She does take vitamin D supplementation with over-the-counter cholecalciferol 1000 unit tablet daily.      Medicines are being sent to [address] [place] [address] per her request.       Deepthi Section (:  [id number]) has requested an audio/video evaluation for the following concern(s):        Review of Systems   Constitutional: Negative. HENT: Negative. Eyes: Negative.     Respiratory: Negative for cough, chest tightness, shortness of breath and wheezing. Cardiovascular: Positive for leg swelling. Negative for chest pain and palpitations. Gastrointestinal: Negative for constipation, diarrhea, nausea and vomiting. Genitourinary: Negative for hematuria. Musculoskeletal: Positive for arthralgias and back pain. Skin: Negative. Neurological: Negative. Psychiatric/Behavioral: Negative. Prior to Visit Medications    Medication Sig Taking? Authorizing Provider   dilTIAZem (CARDIZEM) 90 MG tablet Take 1 tablet by mouth twice daily  Nasra Khan MD   levothyroxine (SYNTHROID) 50 MCG tablet Take 1 tablet by mouth once daily  Nasra Khan MD   lisinopril-hydroCHLOROthiazide (PRINZIDE;ZESTORETIC) 10-12.5 MG per tablet Take 1 tablet by mouth twice daily  Nasra Khan MD   sucralfate (CARAFATE) 1 GM tablet TAKE 1 TABLET BY MOUTH 4 TIMES DAILY. Darryle How, MD   furosemide (LASIX) 40 MG tablet Take 1 tablet by mouth daily  Nasra Khan MD   rosuvastatin (CRESTOR) 5 MG tablet Take 1 tablet by mouth daily  Nasra Khan MD   furosemide (LASIX) 20 MG tablet TAKE 1 TABLET BY MOUTH ONCE DAILY  Nasra Khan MD   cetirizine (ZYRTEC) 10 MG tablet Take 10 mg by mouth daily  Historical Provider, MD   Omega-3 Fatty Acids (FISH OIL) 1000 MG CAPS Take 3,000 mg by mouth daily  Historical Provider, MD   diclofenac 2 % SOLN Place 1 each onto the skin 4 times daily as needed (pain)  Nasra Khan MD   esomeprazole ([address]) 40 MG delayed release capsule TAKE ONE CAPSULE BY MOUTH EVERY DAY  Nasra Khan MD   aspirin 81 MG tablet Take 81 mg by mouth daily One every other day  Nasra Khan MD   Multiple Vitamins-Minerals (PRESERVISION AREDS 2) CAPS Take  by mouth. Historical Provider, MD   latanoprost (XALATAN) 0.005 % ophthalmic solution 1 drop daily. Historical Provider, MD   Multiple Vitamins-Minerals (THERAPEUTIC MULTIVITAMIN-MINERALS) tablet Take 1 tablet by mouth daily. 1. Edema, unspecified type  oxyCODONE-acetaminophen (PERCOCET)  MG per tablet    furosemide (LASIX) 40 MG tablet    + 2 to +3 pitting pedal edema bilateral lower extremities. 2. Chronic diastolic congestive heart failure ([address]     3. Essential hypertension     4. Gastroesophageal reflux disease without esophagitis     5. Acquired hypothyroidism     6. Chronic midline low back pain with left-sided sciatica  oxyCODONE-acetaminophen (PERCOCET)  MG per tablet   7. Other osteoarthritis involving multiple joints     8. Hypertriglyceridemia     9. Vitamin D deficiency     10. Chronic fatigue       I am having Ms. Isa George maintain her :   Outpatient Medications Marked as Taking for the 4/23/21 encounter (Virtual Visit) with Abner Kauffman MD   Medication Sig Dispense Refill    oxyCODONE-acetaminophen (PERCOCET)  MG per tablet Take 1 tablet by mouth every 6 hours as needed for Pain for up to 30 days. 120 tablet 0    traZODone (DESYREL) 50 MG tablet Take 1 tablet by mouth nightly 30 tablet 1    furosemide (LASIX) 40 MG tablet Take 1 tablet by mouth 2 times daily 180 tablet 3     Medications Discontinued During This Encounter   Medication Reason    furosemide (LASIX) 40 MG tablet REORDER     Requested Prescriptions     Signed Prescriptions Disp Refills    oxyCODONE-acetaminophen (PERCOCET)  MG per tablet 120 tablet 0     Sig: Take 1 tablet by mouth every 6 hours as needed for Pain for up to 30 days.  traZODone (DESYREL) 50 MG tablet 30 tablet 1     Sig: Take 1 tablet by mouth nightly    furosemide (LASIX) 40 MG tablet 180 tablet 3     Sig: Take 1 tablet by mouth 2 times daily   . Isa George, was evaluated through a synchronous (real-time) audio-video encounter. The patient (or guardian if applicable) is aware that this is a billable service. Verbal consent to proceed has been obtained within the past 12 months.  The visit was conducted pursuant to the emergency declaration under the [facility], [address] [place] [address] waiver authority and the [employer] General Act. Patient identification was verified, and a caregiver was present when appropriate. The patient was located in a state where the provider was credentialed to provide care. Total time spent on this encounter: Not billed by time    --Kurt Guajardo MD on 4/23/2021 at 10:50 AM    An electronic signature was used to authenticate this note.

## 2021-05-17 NOTE — PROGRESS NOTES
5/17/2021    TELEHEALTH EVALUATION -- Audio/Visual (During NBSXI-07 public health emergency)  This patient is being seen today by telehealth using doxy. me. We are completing this called her a caretaker who is helping her. Telehealth is implemented due to the current pandemic in [place] caused by coronavirus. The patient previously has been advised that the services billed to her insurance by [facility] ([facility]). HPI:  This patient is seen here intermittently by the undersigned for management of chronic disease states. The patient's daughter-in-law facilitated the call on her phone. The daughter-in-law did inform me that she and her  are going to be with Mrs. Dale  now 24/7. This certainly is a good situation to be looked after so attentively by family. She states that Ms. Dio Richardson has been doing well and Ms. Dale herself stated that she had been doing well. The daughter-in-law did mention that the legs have been swelling with some pitting edema. She was able at her virtual visit 1 month ago to focus the camera on this and she does indeed have +2 to +3 pitting pedal edema which is bilateral.  She still is having some issues with edema despite using the Lasix 20 mg p.o. daily. I therefore I am going to raise this up to 2 of the 20 mg twice daily for the next 5 days. After the 5-day period of time, she will then cut back to 20 mg p.o. twice daily. She was instructed to be sure to have her eat a banana or an orange each day or 1 of each. This would help restore any potential depleted potassium caused by the excess Lasix. It is remembered that she also does take hydrochlorothiazide as well. This is utilized in the lisinopril/HCTZ at a dose of 10/12.5 taken twice daily. Since she is continuing to do poorly, they did ask if we could potentially consider involving hospice. We are therefore going to send in a request for evaluation by hospice.   Some discussion was made regarding possible utilization of a hospital bed. I did inform them that after their hospice evaluation if it is deemed that they would benefit from a hospital bed that we certainly would be in agreement with that and be receptive to placing an order for such.     She does have chronic midline low back pain with left-sided sciatica.  She has a history of primary osteoarthritis affecting multiple joints and also does have a history of pelvic fracture in the past.  She currently is maintained on Percocet 10/325 utilized up to every 6 hour intervals for her pain.  She is no longer taking Naprosyn since it caused her to have significant nausea.  She has tolerated Mobic 15 mg p.o. daily for the that in the past so we are going to refill the Mobic for her today.  She does have Carafate available at 1 g before meals and at bedtime and she was instructed to use 1 of those each time she takes her meloxicam also.  She previously was taking Nexium but has been able to discontinue that since she had Carafate.     Additionally, today the daughter-in-law states that Mrs. Dale does have some problems sleeping at night. In an effort to try to help with this, we did  add trazodone 50 mg and had  her use 1 about 30 minutes prior to going to bed. She does have a history of essential hypertension.  She is on lisinopril/HCTZ at 10/12.5 taken twice daily.  She states that she does monitor her pressure and her pressures are doing well. .   Diltiazem 90 is taken twice daily. Additionally, she will be on the Lasix at 20 mg taking 2 pills at a time twice daily for 5 days and then cut back to 20 mg p.o. twice daily.       She does have a history of acquired hypothyroidism.  She is on levothyroxine 50 mcg taken once daily.  She tolerates this well and it has been hopeful with regulation of thyroid disorder.     For hyperlipidemia, she is on Crestor 5 mg p.o. nightly.  Additionally she takes an 81 mg aspirin every other day.     She does have glaucoma.  She is maintained Historical Provider, MD   Omega-3 Fatty Acids (FISH OIL) 1000 MG CAPS Take 3,000 mg by mouth daily  Historical Provider, MD   diclofenac 2 % SOLN Place 1 each onto the skin 4 times daily as needed (pain)  Paul Lepe MD   esomeprazole ([address]) 40 MG delayed release capsule TAKE ONE CAPSULE BY MOUTH EVERY DAY  Paul Lepe MD   aspirin 81 MG tablet Take 81 mg by mouth daily One every other day  Paul Lepe MD   Multiple Vitamins-Minerals (PRESERVISION AREDS 2) CAPS Take  by mouth. Historical Provider, MD   latanoprost (XALATAN) 0.005 % ophthalmic solution 1 drop daily. Historical Provider, MD   Multiple Vitamins-Minerals (THERAPEUTIC MULTIVITAMIN-MINERALS) tablet Take 1 tablet by mouth daily. Historical Provider, MD   calcium carbonate (OSCAL) 500 MG TABS tablet Take 500 mg by mouth daily. Historical Provider, MD   vitamin D3 (CHOLECALCIFEROL) 1000 UNITS TABS tablet Take 1,000 Units by mouth daily. Historical Provider, MD       Social History     Tobacco Use    Smoking status: Former Smoker     Types: Cigarettes    Smokeless tobacco: Never Used   Substance Use Topics    Alcohol use: No    Drug use: No        Allergies   Allergen Reactions    Detrol [Tolterodine Tartrate] Swelling    Mobic [Meloxicam] Nausea Only     Medication was very fairly effective but did cause some GI distress.     Naprosyn [Naproxen]      N/v      Bactrim [Sulfamethoxazole-Trimethoprim] Nausea And Vomiting   ,   Past Medical History:   Diagnosis Date    Allergic rhinitis     Chronic back pain     Hiatal hernia     History of hematuria     History of shingles     Hyperglycemia     Hyperlipidemia     Hypertension     Hypertriglyceridemia     Hypothyroidism     MVP (mitral valve prolapse) \    Osteoarthritis    ,   Past Surgical History:   Procedure Laterality Date    ADENOIDECTOMY      APPENDECTOMY      BACK SURGERY      BRAIN TUMOR EXCISION      Cystic    EYE SURGERY Right     laser surgery    HYSTERECTOMY  JOINT REPLACEMENT  03/11/2015    right hip replacement    TONSILLECTOMY     ,   Social History     Tobacco Use    Smoking status: Former Smoker     Types: Cigarettes    Smokeless tobacco: Never Used   Substance Use Topics    Alcohol use: No    Drug use: No   , No family history on file.,   Immunization History   Administered Date(s) Administered    Influenza Virus Vaccine 10/07/2014    Influenza, High Dose (Fluzone 65 yrs and older) 10/20/2015, 10/25/2017, 10/05/2018    Influenza, Vidal Locker, IM, (6 mo and older Fluzone, Flulaval, Fluarix and 3 yrs and older Afluria) 10/06/2016    Influenza, Triv, inactivated, subunit, adjuvanted, IM (Fluad 65 yrs and older) 10/24/2019    Pneumococcal Conjugate 13-valent ([id number]) 10/06/2016    Pneumococcal Polysaccharide ([id number]) 10/07/2014       PHYSICAL EXAMINATION:  [ INSTRUCTIONS:  \"[x]\" Indicates a positive item  \"[]\" Indicates a negative item  -- DELETE ALL ITEMS NOT EXAMINED]  Vital Signs: (As obtained by patient/caregiver or practitioner observation)    Blood pressure-  Heart rate-    Respiratory rate-    Temperature-  Pulse oximetry-     Constitutional: [x] Appears well-developed and well-nourished [x] No apparent distress      [] Abnormal-   Mental status  [x] Alert and awake  [x] Oriented to person/place/time [x]Able to follow commands      Eyes:  EOM    [x]  Normal  [] Abnormal-  Sclera  [x]  Normal  [] Abnormal -         Discharge [x]  None visible  [] Abnormal -    HENT:   [x] Normocephalic, atraumatic.   [] Abnormal   [x] Mouth/Throat: Mucous membranes are moist.     External Ears [x] Normal  [] Abnormal-     Neck: [x] No visualized mass     Pulmonary/Chest: [x] Respiratory effort normal.  [x] No visualized signs of difficulty breathing or respiratory distress        [] Abnormal-      Musculoskeletal:   [] Normal gait with no signs of ataxia         [] Normal range of motion of neck        [] Abnormal-       Neurological:        [x] No Facial Asymmetry (Cranial nerve 7 motor function) (limited exam to video visit)          [x] No gaze palsy        [] Abnormal-         Skin:        [x] No significant exanthematous lesions or discoloration noted on facial skin         [] Abnormal-            Psychiatric:       [x] Normal Affect [x] No Hallucinations        [] Abnormal-     Other pertinent observable physical exam findings-     ASSESSMENT/PLAN:   Diagnosis Orders   1. Chronic midline low back pain with left-sided sciatica  oxyCODONE-acetaminophen (PERCOCET)  MG per tablet    furosemide (LASIX) 20 MG tablet    [facility]   2. Edema, unspecified type  oxyCODONE-acetaminophen (PERCOCET)  MG per tablet    furosemide (LASIX) 20 MG tablet    [facility]    + 2 to +3 pitting pedal edema bilateral lower extremities. 3. Primary insomnia  zolpidem (AMBIEN) 10 MG tablet   4. Chronic diastolic congestive heart failure (HCC)  furosemide (LASIX) 20 MG tablet    [facility] [place] [facility]   5. Gastroesophageal reflux disease without esophagitis     6. Acquired hypothyroidism     7. Other osteoarthritis involving multiple joints     8. Hypertriglyceridemia     9. Vitamin D deficiency       I am having Ms. Percy Izquierdo maintain her :   Outpatient Medications Marked as Taking for the 5/17/21 encounter (Virtual Visit) with Ginger Thomas MD   Medication Sig Dispense Refill    zolpidem (AMBIEN) 10 MG tablet Take 1 tablet by mouth nightly as needed for Sleep for up to 14 days. 30 tablet 0    oxyCODONE-acetaminophen (PERCOCET)  MG per tablet Take 1 tablet by mouth every 6 hours as needed for Pain for up to 30 days.  120 tablet 0    furosemide (LASIX) 20 MG tablet Take 1 tablet by mouth See Admin Instructions Take 2 tabs BID for 5 days, then 1 BID 80 tablet 5   ,Patient states they are no longer utilizing these medication:   Medications Discontinued During This Encounter   Medication Reason    traZODone (DESYREL) 50 MG tablet Alternate therapy    oxyCODONE-acetaminophen (PERCOCET)  MG per tablet REORDER    I have refilled the following medication today:   Requested Prescriptions     Signed Prescriptions Disp Refills    zolpidem (AMBIEN) 10 MG tablet 30 tablet 0     Sig: Take 1 tablet by mouth nightly as needed for Sleep for up to 14 days.  oxyCODONE-acetaminophen (PERCOCET)  MG per tablet 120 tablet 0     Sig: Take 1 tablet by mouth every 6 hours as needed for Pain for up to 30 days.  furosemide (LASIX) 20 MG tablet 80 tablet 5     Sig: Take 1 tablet by mouth See Admin Instructions Take 2 tabs BID for 5 days, then 1 BID   . Elizabeth Mccartney, was evaluated through a synchronous (real-time) audio-video encounter. The patient (or guardian if applicable) is aware that this is a billable service. Verbal consent to proceed has been obtained within the past 12 months. The visit was conducted pursuant to the emergency declaration under the [address] [place] [address] authority and the [employer] and [employer] General Act. Patient identification was verified, and a caregiver was present when appropriate. The patient was located in a state where the provider was credentialed to provide care. Total time spent on this encounter: Not billed by time    --Bernarda Hooper MD on 5/17/2021 at 2:05 PM    An electronic signature was used to authenticate this note.